# Patient Record
Sex: MALE | Race: ASIAN | NOT HISPANIC OR LATINO
[De-identification: names, ages, dates, MRNs, and addresses within clinical notes are randomized per-mention and may not be internally consistent; named-entity substitution may affect disease eponyms.]

---

## 2023-12-14 PROBLEM — Z00.00 ENCOUNTER FOR PREVENTIVE HEALTH EXAMINATION: Status: ACTIVE | Noted: 2023-12-14

## 2023-12-15 ENCOUNTER — APPOINTMENT (OUTPATIENT)
Dept: THORACIC SURGERY | Facility: CLINIC | Age: 73
End: 2023-12-15
Payer: MEDICARE

## 2023-12-15 ENCOUNTER — OUTPATIENT (OUTPATIENT)
Dept: OUTPATIENT SERVICES | Facility: HOSPITAL | Age: 73
LOS: 1 days | End: 2023-12-15
Payer: COMMERCIAL

## 2023-12-15 VITALS
OXYGEN SATURATION: 96 % | WEIGHT: 150 LBS | BODY MASS INDEX: 22.73 KG/M2 | TEMPERATURE: 98.1 F | HEART RATE: 113 BPM | HEIGHT: 68 IN | RESPIRATION RATE: 15 BRPM | SYSTOLIC BLOOD PRESSURE: 124 MMHG | DIASTOLIC BLOOD PRESSURE: 61 MMHG

## 2023-12-15 DIAGNOSIS — Z86.39 PERSONAL HISTORY OF OTHER ENDOCRINE, NUTRITIONAL AND METABOLIC DISEASE: ICD-10-CM

## 2023-12-15 DIAGNOSIS — R63.4 ABNORMAL WEIGHT LOSS: ICD-10-CM

## 2023-12-15 DIAGNOSIS — R18.8 OTHER ASCITES: ICD-10-CM

## 2023-12-15 DIAGNOSIS — Z01.818 ENCOUNTER FOR OTHER PREPROCEDURAL EXAMINATION: ICD-10-CM

## 2023-12-15 DIAGNOSIS — R06.00 DYSPNEA, UNSPECIFIED: ICD-10-CM

## 2023-12-15 DIAGNOSIS — Z86.79 PERSONAL HISTORY OF OTHER DISEASES OF THE CIRCULATORY SYSTEM: ICD-10-CM

## 2023-12-15 LAB
ALBUMIN SERPL ELPH-MCNC: 3.5 G/DL — SIGNIFICANT CHANGE UP (ref 3.3–5)
ALBUMIN SERPL ELPH-MCNC: 3.5 G/DL — SIGNIFICANT CHANGE UP (ref 3.3–5)
ALP SERPL-CCNC: 88 U/L — SIGNIFICANT CHANGE UP (ref 40–120)
ALP SERPL-CCNC: 88 U/L — SIGNIFICANT CHANGE UP (ref 40–120)
ALT FLD-CCNC: 15 U/L — SIGNIFICANT CHANGE UP (ref 10–45)
ALT FLD-CCNC: 15 U/L — SIGNIFICANT CHANGE UP (ref 10–45)
ANION GAP SERPL CALC-SCNC: 11 MMOL/L — SIGNIFICANT CHANGE UP (ref 5–17)
ANION GAP SERPL CALC-SCNC: 11 MMOL/L — SIGNIFICANT CHANGE UP (ref 5–17)
APPEARANCE UR: CLEAR — SIGNIFICANT CHANGE UP
APPEARANCE UR: CLEAR — SIGNIFICANT CHANGE UP
APTT BLD: 36.8 SEC — HIGH (ref 24.5–35.6)
APTT BLD: 36.8 SEC — HIGH (ref 24.5–35.6)
AST SERPL-CCNC: 14 U/L — SIGNIFICANT CHANGE UP (ref 10–40)
AST SERPL-CCNC: 14 U/L — SIGNIFICANT CHANGE UP (ref 10–40)
BACTERIA # UR AUTO: ABNORMAL /HPF
BACTERIA # UR AUTO: ABNORMAL /HPF
BASOPHILS # BLD AUTO: 0.04 K/UL — SIGNIFICANT CHANGE UP (ref 0–0.2)
BASOPHILS # BLD AUTO: 0.04 K/UL — SIGNIFICANT CHANGE UP (ref 0–0.2)
BASOPHILS NFR BLD AUTO: 0.4 % — SIGNIFICANT CHANGE UP (ref 0–2)
BASOPHILS NFR BLD AUTO: 0.4 % — SIGNIFICANT CHANGE UP (ref 0–2)
BILIRUB SERPL-MCNC: 0.2 MG/DL — SIGNIFICANT CHANGE UP (ref 0.2–1.2)
BILIRUB SERPL-MCNC: 0.2 MG/DL — SIGNIFICANT CHANGE UP (ref 0.2–1.2)
BILIRUB UR-MCNC: NEGATIVE — SIGNIFICANT CHANGE UP
BILIRUB UR-MCNC: NEGATIVE — SIGNIFICANT CHANGE UP
BLD GP AB SCN SERPL QL: NEGATIVE — SIGNIFICANT CHANGE UP
BLD GP AB SCN SERPL QL: NEGATIVE — SIGNIFICANT CHANGE UP
BUN SERPL-MCNC: 48 MG/DL — HIGH (ref 7–23)
BUN SERPL-MCNC: 48 MG/DL — HIGH (ref 7–23)
CALCIUM SERPL-MCNC: 12.2 MG/DL — HIGH (ref 8.4–10.5)
CALCIUM SERPL-MCNC: 12.2 MG/DL — HIGH (ref 8.4–10.5)
CAST: 3 /LPF — SIGNIFICANT CHANGE UP (ref 0–4)
CAST: 3 /LPF — SIGNIFICANT CHANGE UP (ref 0–4)
CHLORIDE SERPL-SCNC: 101 MMOL/L — SIGNIFICANT CHANGE UP (ref 96–108)
CHLORIDE SERPL-SCNC: 101 MMOL/L — SIGNIFICANT CHANGE UP (ref 96–108)
CO2 SERPL-SCNC: 25 MMOL/L — SIGNIFICANT CHANGE UP (ref 22–31)
CO2 SERPL-SCNC: 25 MMOL/L — SIGNIFICANT CHANGE UP (ref 22–31)
COD CRY URNS QL: PRESENT
COD CRY URNS QL: PRESENT
COLOR SPEC: YELLOW — SIGNIFICANT CHANGE UP
COLOR SPEC: YELLOW — SIGNIFICANT CHANGE UP
CREAT SERPL-MCNC: 1.91 MG/DL — HIGH (ref 0.5–1.3)
CREAT SERPL-MCNC: 1.91 MG/DL — HIGH (ref 0.5–1.3)
DIFF PNL FLD: NEGATIVE — SIGNIFICANT CHANGE UP
DIFF PNL FLD: NEGATIVE — SIGNIFICANT CHANGE UP
EGFR: 37 ML/MIN/1.73M2 — LOW
EGFR: 37 ML/MIN/1.73M2 — LOW
EOSINOPHIL # BLD AUTO: 1.24 K/UL — HIGH (ref 0–0.5)
EOSINOPHIL # BLD AUTO: 1.24 K/UL — HIGH (ref 0–0.5)
EOSINOPHIL NFR BLD AUTO: 13.7 % — HIGH (ref 0–6)
EOSINOPHIL NFR BLD AUTO: 13.7 % — HIGH (ref 0–6)
GLUCOSE SERPL-MCNC: 134 MG/DL — HIGH (ref 70–99)
GLUCOSE SERPL-MCNC: 134 MG/DL — HIGH (ref 70–99)
GLUCOSE UR QL: NEGATIVE MG/DL — SIGNIFICANT CHANGE UP
GLUCOSE UR QL: NEGATIVE MG/DL — SIGNIFICANT CHANGE UP
HCT VFR BLD CALC: 29.7 % — LOW (ref 39–50)
HCT VFR BLD CALC: 29.7 % — LOW (ref 39–50)
HGB BLD-MCNC: 9.7 G/DL — LOW (ref 13–17)
HGB BLD-MCNC: 9.7 G/DL — LOW (ref 13–17)
IMM GRANULOCYTES NFR BLD AUTO: 0.6 % — SIGNIFICANT CHANGE UP (ref 0–0.9)
IMM GRANULOCYTES NFR BLD AUTO: 0.6 % — SIGNIFICANT CHANGE UP (ref 0–0.9)
INR BLD: 1.29 — HIGH (ref 0.85–1.18)
INR BLD: 1.29 — HIGH (ref 0.85–1.18)
KETONES UR-MCNC: NEGATIVE MG/DL — SIGNIFICANT CHANGE UP
KETONES UR-MCNC: NEGATIVE MG/DL — SIGNIFICANT CHANGE UP
LEUKOCYTE ESTERASE UR-ACNC: NEGATIVE — SIGNIFICANT CHANGE UP
LEUKOCYTE ESTERASE UR-ACNC: NEGATIVE — SIGNIFICANT CHANGE UP
LYMPHOCYTES # BLD AUTO: 1.37 K/UL — SIGNIFICANT CHANGE UP (ref 1–3.3)
LYMPHOCYTES # BLD AUTO: 1.37 K/UL — SIGNIFICANT CHANGE UP (ref 1–3.3)
LYMPHOCYTES # BLD AUTO: 15.1 % — SIGNIFICANT CHANGE UP (ref 13–44)
LYMPHOCYTES # BLD AUTO: 15.1 % — SIGNIFICANT CHANGE UP (ref 13–44)
MCHC RBC-ENTMCNC: 29.2 PG — SIGNIFICANT CHANGE UP (ref 27–34)
MCHC RBC-ENTMCNC: 29.2 PG — SIGNIFICANT CHANGE UP (ref 27–34)
MCHC RBC-ENTMCNC: 32.7 GM/DL — SIGNIFICANT CHANGE UP (ref 32–36)
MCHC RBC-ENTMCNC: 32.7 GM/DL — SIGNIFICANT CHANGE UP (ref 32–36)
MCV RBC AUTO: 89.5 FL — SIGNIFICANT CHANGE UP (ref 80–100)
MCV RBC AUTO: 89.5 FL — SIGNIFICANT CHANGE UP (ref 80–100)
MONOCYTES # BLD AUTO: 0.79 K/UL — SIGNIFICANT CHANGE UP (ref 0–0.9)
MONOCYTES # BLD AUTO: 0.79 K/UL — SIGNIFICANT CHANGE UP (ref 0–0.9)
MONOCYTES NFR BLD AUTO: 8.7 % — SIGNIFICANT CHANGE UP (ref 2–14)
MONOCYTES NFR BLD AUTO: 8.7 % — SIGNIFICANT CHANGE UP (ref 2–14)
NEUTROPHILS # BLD AUTO: 5.57 K/UL — SIGNIFICANT CHANGE UP (ref 1.8–7.4)
NEUTROPHILS # BLD AUTO: 5.57 K/UL — SIGNIFICANT CHANGE UP (ref 1.8–7.4)
NEUTROPHILS NFR BLD AUTO: 61.5 % — SIGNIFICANT CHANGE UP (ref 43–77)
NEUTROPHILS NFR BLD AUTO: 61.5 % — SIGNIFICANT CHANGE UP (ref 43–77)
NITRITE UR-MCNC: NEGATIVE — SIGNIFICANT CHANGE UP
NITRITE UR-MCNC: NEGATIVE — SIGNIFICANT CHANGE UP
NRBC # BLD: 0 /100 WBCS — SIGNIFICANT CHANGE UP (ref 0–0)
NRBC # BLD: 0 /100 WBCS — SIGNIFICANT CHANGE UP (ref 0–0)
PH UR: 6 — SIGNIFICANT CHANGE UP (ref 5–8)
PH UR: 6 — SIGNIFICANT CHANGE UP (ref 5–8)
PLATELET # BLD AUTO: 512 K/UL — HIGH (ref 150–400)
PLATELET # BLD AUTO: 512 K/UL — HIGH (ref 150–400)
POTASSIUM SERPL-MCNC: 5.4 MMOL/L — HIGH (ref 3.5–5.3)
POTASSIUM SERPL-MCNC: 5.4 MMOL/L — HIGH (ref 3.5–5.3)
POTASSIUM SERPL-SCNC: 5.4 MMOL/L — HIGH (ref 3.5–5.3)
POTASSIUM SERPL-SCNC: 5.4 MMOL/L — HIGH (ref 3.5–5.3)
PROT SERPL-MCNC: 7.8 G/DL — SIGNIFICANT CHANGE UP (ref 6–8.3)
PROT SERPL-MCNC: 7.8 G/DL — SIGNIFICANT CHANGE UP (ref 6–8.3)
PROT UR-MCNC: 30 MG/DL
PROT UR-MCNC: 30 MG/DL
PROTHROM AB SERPL-ACNC: 14.6 SEC — HIGH (ref 9.5–13)
PROTHROM AB SERPL-ACNC: 14.6 SEC — HIGH (ref 9.5–13)
RBC # BLD: 3.32 M/UL — LOW (ref 4.2–5.8)
RBC # BLD: 3.32 M/UL — LOW (ref 4.2–5.8)
RBC # FLD: 14.4 % — SIGNIFICANT CHANGE UP (ref 10.3–14.5)
RBC # FLD: 14.4 % — SIGNIFICANT CHANGE UP (ref 10.3–14.5)
RBC CASTS # UR COMP ASSIST: 1 /HPF — SIGNIFICANT CHANGE UP (ref 0–4)
RBC CASTS # UR COMP ASSIST: 1 /HPF — SIGNIFICANT CHANGE UP (ref 0–4)
RH IG SCN BLD-IMP: POSITIVE — SIGNIFICANT CHANGE UP
RH IG SCN BLD-IMP: POSITIVE — SIGNIFICANT CHANGE UP
SODIUM SERPL-SCNC: 137 MMOL/L — SIGNIFICANT CHANGE UP (ref 135–145)
SODIUM SERPL-SCNC: 137 MMOL/L — SIGNIFICANT CHANGE UP (ref 135–145)
SP GR SPEC: 1.02 — SIGNIFICANT CHANGE UP (ref 1–1.03)
SP GR SPEC: 1.02 — SIGNIFICANT CHANGE UP (ref 1–1.03)
SQUAMOUS # UR AUTO: 0 /HPF — SIGNIFICANT CHANGE UP (ref 0–5)
SQUAMOUS # UR AUTO: 0 /HPF — SIGNIFICANT CHANGE UP (ref 0–5)
UROBILINOGEN FLD QL: 0.2 MG/DL — SIGNIFICANT CHANGE UP (ref 0.2–1)
UROBILINOGEN FLD QL: 0.2 MG/DL — SIGNIFICANT CHANGE UP (ref 0.2–1)
WBC # BLD: 9.06 K/UL — SIGNIFICANT CHANGE UP (ref 3.8–10.5)
WBC # BLD: 9.06 K/UL — SIGNIFICANT CHANGE UP (ref 3.8–10.5)
WBC # FLD AUTO: 9.06 K/UL — SIGNIFICANT CHANGE UP (ref 3.8–10.5)
WBC # FLD AUTO: 9.06 K/UL — SIGNIFICANT CHANGE UP (ref 3.8–10.5)
WBC UR QL: 1 /HPF — SIGNIFICANT CHANGE UP (ref 0–5)
WBC UR QL: 1 /HPF — SIGNIFICANT CHANGE UP (ref 0–5)

## 2023-12-15 PROCEDURE — 36415 COLL VENOUS BLD VENIPUNCTURE: CPT

## 2023-12-15 PROCEDURE — 99205 OFFICE O/P NEW HI 60 MIN: CPT

## 2023-12-19 PROBLEM — Z86.39 HISTORY OF HYPERLIPIDEMIA: Status: RESOLVED | Noted: 2023-12-19 | Resolved: 2023-12-19

## 2023-12-19 PROBLEM — R63.4 WEIGHT LOSS, UNINTENTIONAL: Status: ACTIVE | Noted: 2023-12-19

## 2023-12-19 PROBLEM — Z86.79 HISTORY OF HYPERTENSION: Status: RESOLVED | Noted: 2023-12-19 | Resolved: 2023-12-19

## 2023-12-19 PROBLEM — R18.8 ABDOMINAL ASCITES: Status: ACTIVE | Noted: 2023-12-19

## 2023-12-19 PROBLEM — R06.00 DYSPNEA, UNSPECIFIED TYPE: Status: ACTIVE | Noted: 2023-12-19

## 2023-12-19 RX ORDER — ALBUTEROL SULFATE 90 UG/1
108 (90 BASE) INHALANT RESPIRATORY (INHALATION)
Qty: 8 | Refills: 0 | Status: ACTIVE | COMMUNITY
Start: 2023-09-14

## 2023-12-19 RX ORDER — ALLOPURINOL 100 MG/1
100 TABLET ORAL
Qty: 30 | Refills: 0 | Status: ACTIVE | COMMUNITY
Start: 2023-06-24

## 2023-12-19 RX ORDER — ICOSAPENT ETHYL 1000 MG/1
1 CAPSULE ORAL
Qty: 360 | Refills: 0 | Status: ACTIVE | COMMUNITY
Start: 2023-09-16

## 2023-12-19 RX ORDER — FAMOTIDINE 40 MG/1
40 TABLET, FILM COATED ORAL
Qty: 60 | Refills: 0 | Status: ACTIVE | COMMUNITY
Start: 2023-12-08

## 2023-12-19 NOTE — PHYSICAL EXAM
[Ambulatory and capable of all self care but unable to carry out any work activities] : Status 2- Ambulatory and capable of all self care but unable to carry out any work activities. Up and about more than 50% of waking hours [General Appearance - Alert] : alert [Outer Ear] : the ears and nose were normal in appearance [Oropharynx] : the oropharynx was normal [Neck Appearance] : the appearance of the neck was normal [Neck Cervical Mass (___cm)] : no neck mass was observed [Jugular Venous Distention Increased] : there was no jugular-venous distention [Thyroid Diffuse Enlargement] : the thyroid was not enlarged [Thyroid Nodule] : there were no palpable thyroid nodules [Decreased Breath Sounds] : decreased breath sounds [Heart Rate And Rhythm] : heart rate was normal and rhythm regular [Heart Sounds] : normal S1 and S2 [Heart Sounds Gallop] : no gallops [Murmurs] : no murmurs [Heart Sounds Pericardial Friction Rub] : no pericardial rub [Bowel Sounds] : normal bowel sounds [Abdomen Soft] : soft [Abdomen Tenderness] : non-tender [] : no hepato-splenomegaly [Abdomen Mass (___ Cm)] : no abdominal mass palpated [Abnormal Walk] : normal gait [Musculoskeletal - Swelling] : no joint swelling seen [Nail Clubbing] : no clubbing  or cyanosis of the fingernails [Motor Tone] : muscle strength and tone were normal [Deep Tendon Reflexes (DTR)] : deep tendon reflexes were 2+ and symmetric [Sensation] : the sensory exam was normal to light touch and pinprick [No Focal Deficits] : no focal deficits [Oriented To Time, Place, And Person] : oriented to person, place, and time [Impaired Insight] : insight and judgment were intact [Affect] : the affect was normal [FreeTextEntry1] : generalized red small bumps

## 2023-12-19 NOTE — ASSESSMENT
[FreeTextEntry1] : 73 year old M, Cantonese speaking, never a smoker, with PMHx of HTN, HLD, who is referred by Dr. Edward Jensen for an initial evaluation of pleural effusion.   Patient reports right lower quadrant pain since Aug 2023, along with poor appetite, unintentionally weight loss around 20lbs, fatigue, progressively worse hoarseness and dyspnea. He had Shingles right arm in Oct 2023, followed by generalized hives.  Recent EGD and colonoscopy unremarkable. Denies chronic cough, nausea, vomiting. No personal history of TB infection.    I have independently reviewed the medical records and imaging at the time of this office consultation, and discussed the following interpretations with the patient: - PET/CT on 12/09/2023 revealed marked abnormal increased glucose metabolic activity in diffuse extensive pleural thickening in the right lung and a large right pleural effusion.  The findings may be related to pleural-based metastatic disease or mesothelioma.  I suggest pleural biopsy for a definite diagnosis. Meanwhile, will drain the right pleural effusion to improve his dyspnea.  - The risks, benefits and alternatives of proceeding with Right Video-Assisted Thoracoscopic Surgery, Robotic Assisted, Right pleural biopsy, Pleural effusion drainage, possible pleurodesis and PleurX placement were discussed with the patient. Specifically, the risk of bleeding, need for a blood transfusion, conversion to an open procedure, prolonged air leak, dysrhythmia, myocardial infarction, deep venous thrombosis, pulmonary embolus, postoperative pain syndrome, infection, as well as mortality was discussed with the patient and his family, who understood and agreed to the above. - Patient was advised to increased oral intake of protein.  Contact us should any concerning symptoms arise. Patient verbally understood and agreed with the plan.  Plan: - Right Video-Assisted Thoracoscopic Surgery, Robotic Assisted, Right pleural biopsy, Pleural effusion drainage, possible pleurodesis and PleurX placement on 01/05/2023 - PST in office today - Medical and Cardiac clearance prior to the surgery   I, Dr. John Kaiser MD, personally performed the evaluation and management (E/M) services for this new patient. That E/M includes conducting the clinically appropriate initial history &/or exam, assessing all conditions, and establishing the plan of care. Today, my NP, Clau Meyers, was here to observe &/or participate in the visit & follow plan of care established by me.

## 2023-12-19 NOTE — HISTORY OF PRESENT ILLNESS
[FreeTextEntry1] : GUILLAUME MANNING is a 73 year old M, Cantonese speaking, never a smoker, with PMHx of HTN, HLD, who is referred by Dr. Edward Jensen for an initial evaluation of pleural effusion.   Patient reports right lower quadrant pain since Aug 2023, along with poor appetite, unintentionally weight loss around 20lbs, fatigue, progressively worse hoarseness and dyspnea. He had Shingles right arm in Oct 2023, followed by generalized hives.  Recent EGD and colonoscopy unremarkable. Denies chronic cough, nausea, vomiting. No personal history of TB infection.    PET/CT on 12/09/2023 - there is noted to be marked abnormal increased glucose metabolic activity in diffuse extensive pleural thickening in the right lung field and findings may be related to pleural-based metastatic disease or mesothelioma with this pattern of activity seen.  - a large R pleural effusion is seen.  - No evidence of metastatic mediastinal lymphadenopathy or distant metastatic disease is suspected  MRI abdomen on 10/03/2023 - several small scattered cysts are noted throughout the hepatic parenchyma - additionally noted are several small cysts throughout both kidneys with the largest on left measuring approximately 2.0cm - there is a moderate amount of diffuse ascites throughout the peritoneal cavity. Small pleural effusions are evident within the posterior sulci, seen at the lung base.   MRI pelvis on 10/03/2023 - there is a solitary 6.2x 2.6 mm broad-based, low T2 intensity mucosal bulge at the mid posterior aspect of the urinary bladder. This may represent a region of trabeculation. However, an early mucosal lesion cannot be excluded.  - there is a small amount of ascites throughout the inferior peritoneal recesses.  - there are at least 3 discrete solid, subcentimeter, low T2 signal intensity, non-encapsulated and circumscribed nodules in the transition zone localized to the left antetior apex, left posterolateral mid-prostate, at the pseudocapsule level and right anteromedial transition zone at the prostatic base.  These are most likely to be PI-RADS2 lesions.

## 2023-12-19 NOTE — CONSULT LETTER
[Dear  ___] : Dear  [unfilled], [Consult Letter:] : I had the pleasure of evaluating your patient, [unfilled]. [Please see my note below.] : Please see my note below. [Consult Closing:] : Thank you very much for allowing me to participate in the care of this patient.  If you have any questions, please do not hesitate to contact me. [Sincerely,] : Sincerely, [FreeTextEntry2] : DR. Edward Jensen [FreeTextEntry3] : John Kaiser MD Professor, Cardiovascular & Thoracic Surgery [facility] [employer] Director of the [facility]  Director of Thoracic Surgery, [address] [place] [address] Phone: [phone] Fax: [phone]

## 2024-01-01 ENCOUNTER — NON-APPOINTMENT (OUTPATIENT)
Age: 74
End: 2024-01-01

## 2024-01-02 RX ORDER — PANCRELIPASE 36000; 180000; 114000 [USP'U]/1; [USP'U]/1; [USP'U]/1
36000-114000 CAPSULE, DELAYED RELEASE PELLETS ORAL
Qty: 120 | Refills: 0 | Status: DISCONTINUED | COMMUNITY
Start: 2023-09-14 | End: 2024-01-02

## 2024-01-02 RX ORDER — DAPAGLIFLOZIN 10 MG/1
10 TABLET, FILM COATED ORAL
Qty: 30 | Refills: 0 | Status: DISCONTINUED | COMMUNITY
Start: 2023-08-18 | End: 2024-01-02

## 2024-01-02 RX ORDER — AMLODIPINE AND OLMESARTAN MEDOXOMIL 5; 40 MG/1; MG/1
5-40 TABLET ORAL
Qty: 30 | Refills: 0 | Status: DISCONTINUED | COMMUNITY
Start: 2023-09-14 | End: 2024-01-02

## 2024-01-02 RX ORDER — LOSARTAN POTASSIUM 100 MG/1
100 TABLET, FILM COATED ORAL
Qty: 30 | Refills: 0 | Status: DISCONTINUED | COMMUNITY
Start: 2023-06-10 | End: 2024-01-02

## 2024-01-02 RX ORDER — MULTI VITAMIN/MINERAL SUPPLEMENT WITH ASCORBIC ACID, NIACIN, PYRIDOXINE, PANTOTHENIC ACID, FOLIC ACID, RIBOFLAVIN, THIAMIN, BIOTIN, COBALAMIN AND ZINC. 60; 20; 12.5; 10; 10; 1.7; 1.5; 1; .3; .006 MG/1; MG/1; MG/1; MG/1; MG/1; MG/1; MG/1; MG/1; MG/1; MG/1
TABLET, COATED ORAL
Refills: 0 | Status: ACTIVE | COMMUNITY

## 2024-01-02 NOTE — H&P ADULT - REASON FOR ADMISSION
elective surgery :  Right Video-Assisted Thoracoscopic Surgery, Robotic Assisted, Right pleural biopsy, Pleural effusion drainage, possible pleurodesis and PleurX placement

## 2024-01-02 NOTE — H&P ADULT - NSHPPHYSICALEXAM_GEN_ALL_CORE
weight = 150 lbs    ECOG Performance Status: Status 2- Ambulatory and capable of all self care but unable to carry out any work activities. Up and about more than 50% of waking hours.     Constitutional: alert . ill looking.  ENT: the ears and nose were normal in appearance . the oropharynx was normal.  Neck: the appearance of the neck was normal, no neck mass was observed, the thyroid was not enlarged and there were no palpable thyroid nodules . there was no jugular-venous distention.  decreased breath sounds heard on auscultation (on right).  Heart: heart rate was normal and rhythm regular, normal S1 and S2, no gallops, no murmurs and no pericardial rub.  Abdomen: normal bowel sounds, soft, non-tender, no hepato-splenomegaly and no abdominal mass palpated.  Musculoskeletal: normal gait, no clubbing or cyanosis of the fingernails, no joint swelling seen and muscle strength and tone were normal.  Skin: generalized red small bumps.  Neurological: deep tendon reflexes were 2+ and symmetric, the sensory exam was normal to light touch and pinprick and no focal deficits.  Psychiatric: oriented to person, place, and time, insight and judgment were intact and the affect was normal.

## 2024-01-02 NOTE — H&P ADULT - HISTORY OF PRESENT ILLNESS
GUILLAUME MANNING is a 73 year old M, Cantonese speaking, never a smoker, with PMHx of HTN, HLD, who is referred by Dr. Edward Jensen for an initial evaluation of pleural effusion.  ?  Patient reports right lower quadrant pain since Aug 2023, along with poor appetite, unintentionally weight loss around 20lbs, fatigue, progressively worse hoarseness and dyspnea. He had Shingles right arm in Oct 2023, followed by generalized hives. Recent EGD and colonoscopy unremarkable. Denies chronic cough, nausea, vomiting. No personal history of TB infection. GUILLAUME MANNING is a 73 year old M, Cantonese speaking, never a smoker, with PMHx of HTN, HLD, who is referred by Dr. Edward Jensen for an initial evaluation of pleural effusion.  ?  Patient reports right lower quadrant pain since Aug 2023, along with poor appetite, unintentionally weight loss around 20lbs, fatigue, progressively worse hoarseness and dyspnea. He had Shingles right arm in Oct 2023, followed by generalized hives. Recent EGD and colonoscopy unremarkable. Denies chronic cough, nausea, vomiting. No personal history of TB infection.    Patient seen in same day holding area; Reports no changes to PMHx or medications since last seen by our team. Denies acute or current SOB, chest pain, palpitation, N/V/D, fever/chills, recent illness, or any other concerning symptoms.

## 2024-01-02 NOTE — H&P ADULT - NSHPREVIEWOFSYSTEMS_GEN_ALL_CORE
Constitutional: as noted in HPI.    ENT: as noted in HPI.    Respiratory: as noted in HPI.    Gastrointestinal: as noted in HPI.    Integumentary: as noted in HPI.    Eyes, Cardiovascular, Genitourinary, Musculoskeletal, Neurological and Psychiatric are otherwise negative.  ?

## 2024-01-02 NOTE — H&P ADULT - NSICDXPASTMEDICALHX_GEN_ALL_CORE_FT
PAST MEDICAL HISTORY:  Gout     HTN (hypertension)     Hyperlipidemia     Proteinuria     Renal stones     Stage 3 chronic kidney disease

## 2024-01-02 NOTE — H&P ADULT - ASSESSMENT
73 year old M, Cantonese speaking, never a smoker, with PMHx of HTN, HLD, who is referred by Dr. Edward Jensen for an initial evaluation of pleural effusion.  ?  Patient reports right lower quadrant pain since Aug 2023, along with poor appetite, unintentionally weight loss around 20lbs, fatigue, progressively worse hoarseness and dyspnea. He had Shingles right arm in Oct 2023, followed by generalized hives. Recent EGD and colonoscopy unremarkable. Denies chronic cough, nausea, vomiting. No personal history of TB infection.  ?  I have independently reviewed the medical records and imaging at the time of this office consultation, and discussed the following interpretations with the patient:  - PET/CT on 12/09/2023 revealed marked abnormal increased glucose metabolic activity in diffuse extensive pleural thickening in the right lung and a large right pleural effusion. The findings may be related to pleural-based metastatic disease or mesothelioma. I suggest pleural biopsy for a definite diagnosis. Meanwhile, will drain the right pleural effusion to improve his dyspnea.  - The risks, benefits and alternatives of proceeding with Right Video-Assisted Thoracoscopic Surgery, Robotic Assisted, Right pleural biopsy, Pleural effusion drainage, possible pleurodesis and PleurX placement were discussed with the patient. Specifically, the risk of bleeding, need for a blood transfusion, conversion to an open procedure, prolonged air leak, dysrhythmia, myocardial infarction, deep venous thrombosis, pulmonary embolus, postoperative pain syndrome, infection, as well as mortality was discussed with the patient and his family, who understood and agreed to the above.    He is cleared by cardiology, nephrology and PCP for the above procedure.       ? 73 year old M, Cantonese speaking, never a smoker, with PMHx of HTN, HLD, who is referred by Dr. Edward Jensen for an initial evaluation of pleural effusion.  ?  Patient reports right lower quadrant pain since Aug 2023, along with poor appetite, unintentionally weight loss around 20lbs, fatigue, progressively worse hoarseness and dyspnea. He had Shingles right arm in Oct 2023, followed by generalized hives. Recent EGD and colonoscopy unremarkable. Denies chronic cough, nausea, vomiting. No personal history of TB infection.  ?  I have independently reviewed the medical records and imaging at the time of this office consultation, and discussed the following interpretations with the patient:  - PET/CT on 12/09/2023 revealed marked abnormal increased glucose metabolic activity in diffuse extensive pleural thickening in the right lung and a large right pleural effusion. The findings may be related to pleural-based metastatic disease or mesothelioma. I suggest pleural biopsy for a definite diagnosis. Meanwhile, will drain the right pleural effusion to improve his dyspnea.  - The risks, benefits and alternatives of proceeding with Right Video-Assisted Thoracoscopic Surgery, Robotic Assisted, Right pleural biopsy, Pleural effusion drainage, possible pleurodesis and PleurX placement were discussed with the patient. Specifically, the risk of bleeding, need for a blood transfusion, conversion to an open procedure, prolonged air leak, dysrhythmia, myocardial infarction, deep venous thrombosis, pulmonary embolus, postoperative pain syndrome, infection, as well as mortality was discussed with the patient and his family, who understood and agreed to the above.    He is cleared by cardiology, nephrology and PCP for the above procedure.     Admit under Dr. Kaiser  via same day surgery. Consent signed, placed on chart.  Risks/benefits reviewed, patient understands and agrees. T&S ordered and blood products placed on hold for OR.  To 9L  post-op.     ?

## 2024-01-02 NOTE — H&P ADULT - NSHPOUTPATIENTPROVIDERS_GEN_ALL_CORE
PCP: Dr. Geovany Fernandes office NP Raul Gonzalez  Phone: [phone]  ?  Cardiology: [facility]  Phone: [phone]    Renal: [facility]: Marilee Rodriguez [phone] PCP: Dr. Geovany Fernandes office NP Raul Gonzalez  Phone: [phone]  ?  Cardiology: Counts include 234 beds at the [facility]  Phone: [phone]    Renal: Counts include 234 beds at the [facility]: Marilee Rodriguez [phone]

## 2024-01-02 NOTE — H&P ADULT - NSHPLABSRESULTS_GEN_ALL_CORE
PET/CT on 12/09/2023  - there is noted to be marked abnormal increased glucose metabolic activity in diffuse extensive pleural thickening in the right lung field and findings may be related to pleural-based metastatic disease or mesothelioma with this pattern of activity seen.  - a large R pleural effusion is seen.  - No evidence of metastatic mediastinal lymphadenopathy or distant metastatic disease is suspected  ?  MRI abdomen on 10/03/2023  - several small scattered cysts are noted throughout the hepatic parenchyma  - additionally noted are several small cysts throughout both kidneys with the largest on left measuring approximately 2.0cm  - there is a moderate amount of diffuse ascites throughout the peritoneal cavity. Small pleural effusions are evident within the posterior sulci, seen at the lung base.  ?  MRI pelvis on 10/03/2023  - there is a solitary 6.2x 2.6 mm broad-based, low T2 intensity mucosal bulge at the mid posterior aspect of the urinary bladder. This may represent a region of trabeculation. However, an early mucosal lesion cannot be excluded.  - there is a small amount of ascites throughout the inferior peritoneal recesses.  - there are at least 3 discrete solid, subcentimeter, low T2 signal intensity, non-encapsulated and circumscribed nodules in the transition zone localized to the left antetior apex, left posterolateral mid-prostate, at the pseudocapsule level and right anteromedial transition zone at the prostatic base. These are most likely to be PI-RADS2 lesions.

## 2024-01-04 ENCOUNTER — TRANSCRIPTION ENCOUNTER (OUTPATIENT)
Age: 74
End: 2024-01-04

## 2024-01-04 VITALS
HEART RATE: 78 BPM | DIASTOLIC BLOOD PRESSURE: 74 MMHG | RESPIRATION RATE: 16 BRPM | SYSTOLIC BLOOD PRESSURE: 125 MMHG | WEIGHT: 153 LBS | HEIGHT: 65 IN | TEMPERATURE: 97 F | OXYGEN SATURATION: 97 %

## 2024-01-04 NOTE — PATIENT PROFILE ADULT - FALL HARM RISK - HARM RISK INTERVENTIONS
Communicate Risk of Fall with Harm to all staff/Reinforce activity limits and safety measures with patient and family/Tailored Fall Risk Interventions/Visual Cue: Yellow wristband and red socks/Bed in lowest position, wheels locked, appropriate side rails in place/Call bell, personal items and telephone in reach/Instruct patient to call for assistance before getting out of bed or chair/Non-slip footwear when patient is out of bed/[place] to call system/Physically safe environment - no spills, clutter or unnecessary equipment/Purposeful Proactive Rounding/Room/bathroom lighting operational, light cord in reach Communicate Risk of Fall with Harm to all staff/Reinforce activity limits and safety measures with patient and family/Tailored Fall Risk Interventions/Visual Cue: Yellow wristband and red socks/Bed in lowest position, wheels locked, appropriate side rails in place/Call bell, personal items and telephone in reach/Instruct patient to call for assistance before getting out of bed or chair/Non-slip footwear when patient is out of bed/[place] to call system/Physically safe environment - no spills, clutter or unnecessary equipment/Purposeful Proactive Rounding/Room/bathroom lighting operational, light cord in reach

## 2024-01-04 NOTE — PRE-OP CHECKLIST - SELECT TESTS ORDERED
echo, PET CT/CBC/CMP/PT/PTT/INR/Urinalysis/EKG echo, PET CT/CBC/CMP/PT/PTT/INR/Type and Screen/Urinalysis/EKG

## 2024-01-04 NOTE — PRE-OP CHECKLIST - 1.
Call [facility] nurse for any questions or concerns Mon-Fri 8am-4pm:                                                [phone]                                For concerns after hours: [phone]    Isaak Ryan with lab              Visiting hours and policy

## 2024-01-05 ENCOUNTER — INPATIENT (INPATIENT)
Facility: HOSPITAL | Age: 74
LOS: 11 days | Discharge: HOME HEALTH | DRG: 163 | End: 2024-01-17
Attending: THORACIC SURGERY (CARDIOTHORACIC VASCULAR SURGERY) | Admitting: THORACIC SURGERY (CARDIOTHORACIC VASCULAR SURGERY)
Payer: MEDICARE

## 2024-01-05 ENCOUNTER — RESULT REVIEW (OUTPATIENT)
Age: 74
End: 2024-01-05

## 2024-01-05 ENCOUNTER — APPOINTMENT (OUTPATIENT)
Dept: THORACIC SURGERY | Facility: HOSPITAL | Age: 74
End: 2024-01-05

## 2024-01-05 LAB
ANION GAP SERPL CALC-SCNC: 10 MMOL/L — SIGNIFICANT CHANGE UP (ref 5–17)
ANION GAP SERPL CALC-SCNC: 10 MMOL/L — SIGNIFICANT CHANGE UP (ref 5–17)
ANION GAP SERPL CALC-SCNC: 18 MMOL/L — HIGH (ref 5–17)
ANION GAP SERPL CALC-SCNC: 18 MMOL/L — HIGH (ref 5–17)
BASOPHILS # BLD AUTO: 0.03 K/UL — SIGNIFICANT CHANGE UP (ref 0–0.2)
BASOPHILS # BLD AUTO: 0.03 K/UL — SIGNIFICANT CHANGE UP (ref 0–0.2)
BASOPHILS NFR BLD AUTO: 0.2 % — SIGNIFICANT CHANGE UP (ref 0–2)
BASOPHILS NFR BLD AUTO: 0.2 % — SIGNIFICANT CHANGE UP (ref 0–2)
BUN SERPL-MCNC: 43 MG/DL — HIGH (ref 7–23)
BUN SERPL-MCNC: 43 MG/DL — HIGH (ref 7–23)
BUN SERPL-MCNC: 47 MG/DL — HIGH (ref 7–23)
BUN SERPL-MCNC: 47 MG/DL — HIGH (ref 7–23)
CALCIUM SERPL-MCNC: 11.1 MG/DL — HIGH (ref 8.4–10.5)
CALCIUM SERPL-MCNC: 11.1 MG/DL — HIGH (ref 8.4–10.5)
CALCIUM SERPL-MCNC: 11.4 MG/DL — HIGH (ref 8.4–10.5)
CALCIUM SERPL-MCNC: 11.4 MG/DL — HIGH (ref 8.4–10.5)
CHLORIDE SERPL-SCNC: 100 MMOL/L — SIGNIFICANT CHANGE UP (ref 96–108)
CHLORIDE SERPL-SCNC: 100 MMOL/L — SIGNIFICANT CHANGE UP (ref 96–108)
CHLORIDE SERPL-SCNC: 103 MMOL/L — SIGNIFICANT CHANGE UP (ref 96–108)
CHLORIDE SERPL-SCNC: 103 MMOL/L — SIGNIFICANT CHANGE UP (ref 96–108)
CO2 SERPL-SCNC: 17 MMOL/L — LOW (ref 22–31)
CO2 SERPL-SCNC: 17 MMOL/L — LOW (ref 22–31)
CO2 SERPL-SCNC: 20 MMOL/L — LOW (ref 22–31)
CO2 SERPL-SCNC: 20 MMOL/L — LOW (ref 22–31)
CREAT SERPL-MCNC: 2.53 MG/DL — HIGH (ref 0.5–1.3)
CREAT SERPL-MCNC: 2.53 MG/DL — HIGH (ref 0.5–1.3)
CREAT SERPL-MCNC: 2.71 MG/DL — HIGH (ref 0.5–1.3)
CREAT SERPL-MCNC: 2.71 MG/DL — HIGH (ref 0.5–1.3)
EGFR: 24 ML/MIN/1.73M2 — LOW
EGFR: 24 ML/MIN/1.73M2 — LOW
EGFR: 26 ML/MIN/1.73M2 — LOW
EGFR: 26 ML/MIN/1.73M2 — LOW
EOSINOPHIL # BLD AUTO: 0.54 K/UL — HIGH (ref 0–0.5)
EOSINOPHIL # BLD AUTO: 0.54 K/UL — HIGH (ref 0–0.5)
EOSINOPHIL NFR BLD AUTO: 4 % — SIGNIFICANT CHANGE UP (ref 0–6)
EOSINOPHIL NFR BLD AUTO: 4 % — SIGNIFICANT CHANGE UP (ref 0–6)
GLUCOSE SERPL-MCNC: 113 MG/DL — HIGH (ref 70–99)
GLUCOSE SERPL-MCNC: 113 MG/DL — HIGH (ref 70–99)
GLUCOSE SERPL-MCNC: 134 MG/DL — HIGH (ref 70–99)
GLUCOSE SERPL-MCNC: 134 MG/DL — HIGH (ref 70–99)
GRAM STN FLD: SIGNIFICANT CHANGE UP
HCT VFR BLD CALC: 27.4 % — LOW (ref 39–50)
HCT VFR BLD CALC: 27.4 % — LOW (ref 39–50)
HGB BLD-MCNC: 9.1 G/DL — LOW (ref 13–17)
HGB BLD-MCNC: 9.1 G/DL — LOW (ref 13–17)
IMM GRANULOCYTES NFR BLD AUTO: 0.7 % — SIGNIFICANT CHANGE UP (ref 0–0.9)
IMM GRANULOCYTES NFR BLD AUTO: 0.7 % — SIGNIFICANT CHANGE UP (ref 0–0.9)
LYMPHOCYTES # BLD AUTO: 1.38 K/UL — SIGNIFICANT CHANGE UP (ref 1–3.3)
LYMPHOCYTES # BLD AUTO: 1.38 K/UL — SIGNIFICANT CHANGE UP (ref 1–3.3)
LYMPHOCYTES # BLD AUTO: 10.2 % — LOW (ref 13–44)
LYMPHOCYTES # BLD AUTO: 10.2 % — LOW (ref 13–44)
MCHC RBC-ENTMCNC: 29.2 PG — SIGNIFICANT CHANGE UP (ref 27–34)
MCHC RBC-ENTMCNC: 29.2 PG — SIGNIFICANT CHANGE UP (ref 27–34)
MCHC RBC-ENTMCNC: 33.2 GM/DL — SIGNIFICANT CHANGE UP (ref 32–36)
MCHC RBC-ENTMCNC: 33.2 GM/DL — SIGNIFICANT CHANGE UP (ref 32–36)
MCV RBC AUTO: 87.8 FL — SIGNIFICANT CHANGE UP (ref 80–100)
MCV RBC AUTO: 87.8 FL — SIGNIFICANT CHANGE UP (ref 80–100)
MONOCYTES # BLD AUTO: 0.26 K/UL — SIGNIFICANT CHANGE UP (ref 0–0.9)
MONOCYTES # BLD AUTO: 0.26 K/UL — SIGNIFICANT CHANGE UP (ref 0–0.9)
MONOCYTES NFR BLD AUTO: 1.9 % — LOW (ref 2–14)
MONOCYTES NFR BLD AUTO: 1.9 % — LOW (ref 2–14)
NEUTROPHILS # BLD AUTO: 11.19 K/UL — HIGH (ref 1.8–7.4)
NEUTROPHILS # BLD AUTO: 11.19 K/UL — HIGH (ref 1.8–7.4)
NEUTROPHILS NFR BLD AUTO: 83 % — HIGH (ref 43–77)
NEUTROPHILS NFR BLD AUTO: 83 % — HIGH (ref 43–77)
NRBC # BLD: 0 /100 WBCS — SIGNIFICANT CHANGE UP (ref 0–0)
NRBC # BLD: 0 /100 WBCS — SIGNIFICANT CHANGE UP (ref 0–0)
PLATELET # BLD AUTO: 285 K/UL — SIGNIFICANT CHANGE UP (ref 150–400)
PLATELET # BLD AUTO: 285 K/UL — SIGNIFICANT CHANGE UP (ref 150–400)
POTASSIUM SERPL-MCNC: 4.6 MMOL/L — SIGNIFICANT CHANGE UP (ref 3.5–5.3)
POTASSIUM SERPL-MCNC: 4.6 MMOL/L — SIGNIFICANT CHANGE UP (ref 3.5–5.3)
POTASSIUM SERPL-MCNC: 5.1 MMOL/L — SIGNIFICANT CHANGE UP (ref 3.5–5.3)
POTASSIUM SERPL-MCNC: 5.1 MMOL/L — SIGNIFICANT CHANGE UP (ref 3.5–5.3)
POTASSIUM SERPL-SCNC: 4.6 MMOL/L — SIGNIFICANT CHANGE UP (ref 3.5–5.3)
POTASSIUM SERPL-SCNC: 4.6 MMOL/L — SIGNIFICANT CHANGE UP (ref 3.5–5.3)
POTASSIUM SERPL-SCNC: 5.1 MMOL/L — SIGNIFICANT CHANGE UP (ref 3.5–5.3)
POTASSIUM SERPL-SCNC: 5.1 MMOL/L — SIGNIFICANT CHANGE UP (ref 3.5–5.3)
RBC # BLD: 3.12 M/UL — LOW (ref 4.2–5.8)
RBC # BLD: 3.12 M/UL — LOW (ref 4.2–5.8)
RBC # FLD: 16.5 % — HIGH (ref 10.3–14.5)
RBC # FLD: 16.5 % — HIGH (ref 10.3–14.5)
SODIUM SERPL-SCNC: 133 MMOL/L — LOW (ref 135–145)
SODIUM SERPL-SCNC: 133 MMOL/L — LOW (ref 135–145)
SODIUM SERPL-SCNC: 135 MMOL/L — SIGNIFICANT CHANGE UP (ref 135–145)
SODIUM SERPL-SCNC: 135 MMOL/L — SIGNIFICANT CHANGE UP (ref 135–145)
SPECIMEN SOURCE: SIGNIFICANT CHANGE UP
WBC # BLD: 13.5 K/UL — HIGH (ref 3.8–10.5)
WBC # BLD: 13.5 K/UL — HIGH (ref 3.8–10.5)
WBC # FLD AUTO: 13.5 K/UL — HIGH (ref 3.8–10.5)
WBC # FLD AUTO: 13.5 K/UL — HIGH (ref 3.8–10.5)

## 2024-01-05 PROCEDURE — 81001 URINALYSIS AUTO W/SCOPE: CPT

## 2024-01-05 PROCEDURE — 86850 RBC ANTIBODY SCREEN: CPT

## 2024-01-05 PROCEDURE — 88305 TISSUE EXAM BY PATHOLOGIST: CPT | Mod: 26

## 2024-01-05 PROCEDURE — 85610 PROTHROMBIN TIME: CPT

## 2024-01-05 PROCEDURE — 99222 1ST HOSP IP/OBS MODERATE 55: CPT

## 2024-01-05 PROCEDURE — 88112 CYTOPATH CELL ENHANCE TECH: CPT | Mod: 26

## 2024-01-05 PROCEDURE — 86900 BLOOD TYPING SEROLOGIC ABO: CPT

## 2024-01-05 PROCEDURE — 80053 COMPREHEN METABOLIC PANEL: CPT

## 2024-01-05 PROCEDURE — 71045 X-RAY EXAM CHEST 1 VIEW: CPT | Mod: 26

## 2024-01-05 PROCEDURE — 99223 1ST HOSP IP/OBS HIGH 75: CPT

## 2024-01-05 PROCEDURE — 85025 COMPLETE CBC W/AUTO DIFF WBC: CPT

## 2024-01-05 PROCEDURE — 88331 PATH CONSLTJ SURG 1 BLK 1SPC: CPT | Mod: 26

## 2024-01-05 PROCEDURE — 32652 THORACOSCOPY REM TOTL CORTEX: CPT

## 2024-01-05 PROCEDURE — 85730 THROMBOPLASTIN TIME PARTIAL: CPT

## 2024-01-05 PROCEDURE — 88305 TISSUE EXAM BY PATHOLOGIST: CPT | Mod: 26,XP

## 2024-01-05 PROCEDURE — 32652 THORACOSCOPY REM TOTL CORTEX: CPT | Mod: 80

## 2024-01-05 PROCEDURE — 86901 BLOOD TYPING SEROLOGIC RH(D): CPT

## 2024-01-05 PROCEDURE — S2900 ROBOTIC SURGICAL SYSTEM: CPT | Mod: NC

## 2024-01-05 DEVICE — CHEST DRAIN THORACIC PVC 28FR RIGHT ANGLE: Type: IMPLANTABLE DEVICE | Site: RIGHT | Status: FUNCTIONAL

## 2024-01-05 DEVICE — CHEST DRAIN THORACIC PVC 28FR STRAIGHT: Type: IMPLANTABLE DEVICE | Site: RIGHT | Status: FUNCTIONAL

## 2024-01-05 RX ORDER — HEPARIN SODIUM 5000 [USP'U]/ML
5000 INJECTION INTRAVENOUS; SUBCUTANEOUS EVERY 8 HOURS
Refills: 0 | Status: DISCONTINUED | OUTPATIENT
Start: 2024-01-05 | End: 2024-01-17

## 2024-01-05 RX ORDER — ALBUMIN HUMAN 25 %
250 VIAL (ML) INTRAVENOUS ONCE
Refills: 0 | Status: COMPLETED | OUTPATIENT
Start: 2024-01-05 | End: 2024-01-05

## 2024-01-05 RX ORDER — ATORVASTATIN CALCIUM 80 MG/1
1 TABLET, FILM COATED ORAL
Refills: 0 | DISCHARGE

## 2024-01-05 RX ORDER — PANTOPRAZOLE SODIUM 20 MG/1
40 TABLET, DELAYED RELEASE ORAL
Refills: 0 | Status: DISCONTINUED | OUTPATIENT
Start: 2024-01-05 | End: 2024-01-06

## 2024-01-05 RX ORDER — ACETAMINOPHEN 500 MG
975 TABLET ORAL ONCE
Refills: 0 | Status: COMPLETED | OUTPATIENT
Start: 2024-01-05 | End: 2024-01-05

## 2024-01-05 RX ORDER — OXYCODONE HYDROCHLORIDE 5 MG/1
5 TABLET ORAL EVERY 6 HOURS
Refills: 0 | Status: DISCONTINUED | OUTPATIENT
Start: 2024-01-05 | End: 2024-01-05

## 2024-01-05 RX ORDER — ATORVASTATIN CALCIUM 80 MG/1
10 TABLET, FILM COATED ORAL AT BEDTIME
Refills: 0 | Status: DISCONTINUED | OUTPATIENT
Start: 2024-01-05 | End: 2024-01-17

## 2024-01-05 RX ORDER — HEPARIN SODIUM 5000 [USP'U]/ML
5000 INJECTION INTRAVENOUS; SUBCUTANEOUS ONCE
Refills: 0 | Status: COMPLETED | OUTPATIENT
Start: 2024-01-05 | End: 2024-01-05

## 2024-01-05 RX ORDER — NITROGLYCERIN 6.5 MG
1 CAPSULE, EXTENDED RELEASE ORAL
Refills: 0 | DISCHARGE

## 2024-01-05 RX ORDER — FEBUXOSTAT 40 MG/1
1 TABLET ORAL
Refills: 0 | DISCHARGE

## 2024-01-05 RX ORDER — SODIUM BICARBONATE 1 MEQ/ML
1 SYRINGE (ML) INTRAVENOUS
Refills: 0 | DISCHARGE

## 2024-01-05 RX ORDER — SODIUM CHLORIDE 9 MG/ML
1000 INJECTION INTRAMUSCULAR; INTRAVENOUS; SUBCUTANEOUS
Refills: 0 | Status: DISCONTINUED | OUTPATIENT
Start: 2024-01-05 | End: 2024-01-07

## 2024-01-05 RX ORDER — ACETAMINOPHEN 500 MG
975 TABLET ORAL EVERY 6 HOURS
Refills: 0 | Status: DISCONTINUED | OUTPATIENT
Start: 2024-01-05 | End: 2024-01-17

## 2024-01-05 RX ORDER — SODIUM CHLORIDE 9 MG/ML
1000 INJECTION, SOLUTION INTRAVENOUS ONCE
Refills: 0 | Status: COMPLETED | OUTPATIENT
Start: 2024-01-05 | End: 2024-01-05

## 2024-01-05 RX ORDER — SENNA PLUS 8.6 MG/1
2 TABLET ORAL AT BEDTIME
Refills: 0 | Status: DISCONTINUED | OUTPATIENT
Start: 2024-01-05 | End: 2024-01-17

## 2024-01-05 RX ORDER — CEFAZOLIN SODIUM 1 G
2000 VIAL (EA) INJECTION EVERY 8 HOURS
Refills: 0 | Status: COMPLETED | OUTPATIENT
Start: 2024-01-05 | End: 2024-01-07

## 2024-01-05 RX ORDER — POLYETHYLENE GLYCOL 3350 17 G/17G
17 POWDER, FOR SOLUTION ORAL DAILY
Refills: 0 | Status: DISCONTINUED | OUTPATIENT
Start: 2024-01-05 | End: 2024-01-17

## 2024-01-05 RX ORDER — ASPIRIN/CALCIUM CARB/MAGNESIUM 324 MG
0 TABLET ORAL
Refills: 0 | DISCHARGE

## 2024-01-05 RX ORDER — SODIUM CHLORIDE 9 MG/ML
1000 INJECTION, SOLUTION INTRAVENOUS
Refills: 0 | Status: DISCONTINUED | OUTPATIENT
Start: 2024-01-05 | End: 2024-01-05

## 2024-01-05 RX ADMIN — OXYCODONE HYDROCHLORIDE 5 MILLIGRAM(S): 5 TABLET ORAL at 12:00

## 2024-01-05 RX ADMIN — SODIUM CHLORIDE 1000 MILLILITER(S): 9 INJECTION, SOLUTION INTRAVENOUS at 14:20

## 2024-01-05 RX ADMIN — SODIUM CHLORIDE 50 MILLILITER(S): 9 INJECTION, SOLUTION INTRAVENOUS at 12:00

## 2024-01-05 RX ADMIN — Medication 5 MILLIGRAM(S): at 21:29

## 2024-01-05 RX ADMIN — Medication 100 MILLIGRAM(S): at 16:37

## 2024-01-05 RX ADMIN — HALOPERIDOL DECANOATE 5 MILLIGRAM(S): 100 INJECTION INTRAMUSCULAR at 23:00

## 2024-01-05 RX ADMIN — OXYCODONE HYDROCHLORIDE 5 MILLIGRAM(S): 5 TABLET ORAL at 18:48

## 2024-01-05 RX ADMIN — HEPARIN SODIUM 5000 UNIT(S): 5000 INJECTION INTRAVENOUS; SUBCUTANEOUS at 21:29

## 2024-01-05 RX ADMIN — HEPARIN SODIUM 5000 UNIT(S): 5000 INJECTION INTRAVENOUS; SUBCUTANEOUS at 07:04

## 2024-01-05 RX ADMIN — OXYCODONE HYDROCHLORIDE 5 MILLIGRAM(S): 5 TABLET ORAL at 12:30

## 2024-01-05 RX ADMIN — SODIUM CHLORIDE 50 MILLILITER(S): 9 INJECTION INTRAMUSCULAR; INTRAVENOUS; SUBCUTANEOUS at 23:19

## 2024-01-05 RX ADMIN — Medication 975 MILLIGRAM(S): at 07:03

## 2024-01-05 RX ADMIN — Medication 500 MILLILITER(S): at 23:19

## 2024-01-05 RX ADMIN — Medication 100 MILLIGRAM(S): at 23:19

## 2024-01-05 NOTE — PROGRESS NOTE ADULT - SUBJECTIVE AND OBJECTIVE BOX
OPERATION & DATE: 1/5 R VATS, pleural biopsy, decortication    SUBJECTIVE ASSESSMENT: Resting comfortalby in bed, reports some mild pain from incision site.     VITAL SIGNS:  Vital Signs Last 24 Hrs  T(C): 35.7 (05 Jan 2024 11:14), Max: 36.2 (05 Jan 2024 08:41)  T(F): 96.2 (05 Jan 2024 11:14), Max: 96.2 (05 Jan 2024 11:14)  HR: 86 (05 Jan 2024 12:14) (78 - 86)  BP: 118/58 (05 Jan 2024 11:44) (106/55 - 125/74)  BP(mean): 81 (05 Jan 2024 11:44) (74 - 81)  RR: 18 (05 Jan 2024 12:14) (16 - 20)  SpO2: 100% (05 Jan 2024 12:14) (94% - 100%)    Parameters below as of 05 Jan 2024 12:14  Patient On (Oxygen Delivery Method): nasal cannula  O2 Flow (L/min): 2    I&O's Detail    05 Jan 2024 07:01  -  05 Jan 2024 12:38  --------------------------------------------------------  IN:    Lactated Ringers: 100 mL  Total IN: 100 mL    OUT:    Chest Tube (mL): 118 mL    Chest Tube (mL): 17 mL  Total OUT: 135 mL    Total NET: -35 mL        CHEST TUBE:  2 CT in place to suction with airleak  DEON DRAIN:  none  EPICARDIAL WIRES: none  STITCHES: none  STAPLES: none  TRUJILLO: none  CENTRAL LINE: none  MIDLINE/PICC: none  WOUND VAC: none    PHYSICAL EXAM:  General: resting comfortably in bed in NAD  Neurological: AOx3. Motor skills grossly intact  Cardiovascular: Normal S1/S2. Regular rate/rhythm. No murmurs  Respiratory: Lungs CTA bilaterally. No wheezing or rales  Gastrointestinal: +BS in all 4 quadrants. Non-distended. Soft. Non-tender  Extremities: Strength 5/5 b/l upper/lower extremities. Sensation grossly intact upper/lower extremities. No edema. No calf tenderness.  Vascular: Radial 2+bilaterally, DP 2+ b/l  Incision Sites: VATS incisions covered by dressing.       LABS:                        9.1    13.50 )-----------( 285      ( 05 Jan 2024 11:32 )             27.4       01-05    133<L>  |  103  |  47<H>  ----------------------------<  113<H>  4.6   |  20<L>  |  2.71<H>    Ca    11.1<H>      05 Jan 2024 11:32      Urinalysis Basic - ( 05 Jan 2024 11:32 )    Color: x / Appearance: x / SG: x / pH: x  Gluc: 113 mg/dL / Ketone: x  / Bili: x / Urobili: x   Blood: x / Protein: x / Nitrite: x   Leuk Esterase: x / RBC: x / WBC x   Sq Epi: x / Non Sq Epi: x / Bacteria: x      MEDICATIONS  (STANDING):  atorvastatin 10 milliGRAM(s) Oral at bedtime  bisacodyl 5 milliGRAM(s) Oral at bedtime  ceFAZolin   IVPB 2000 milliGRAM(s) IV Intermittent every 8 hours  heparin   Injectable 5000 Unit(s) SubCutaneous every 8 hours  lactated ringers Bolus 1000 milliLiter(s) IV Bolus once  lactated ringers. 1000 milliLiter(s) (75 mL/Hr) IV Continuous <Continuous>  pantoprazole    Tablet 40 milliGRAM(s) Oral before breakfast  polyethylene glycol 3350 17 Gram(s) Oral daily  senna 2 Tablet(s) Oral at bedtime    MEDICATIONS  (PRN):  acetaminophen     Tablet .. 975 milliGRAM(s) Oral every 6 hours PRN Mild Pain (1 - 3)  oxyCODONE    IR 5 milliGRAM(s) Oral every 6 hours PRN Moderate Pain (4 - 6)    RADIOLOGY & ADDITIONAL TESTS:    < from: Xray Chest 1 View- PORTABLE-Urgent (01.05.24 @ 11:54) >  IMPRESSION: Interval right thoracic surgery with removal of right pleural   effusion. Small loculated right pneumothorax. Subcutaneous emphysema.    < end of copied text >

## 2024-01-05 NOTE — CONSULT NOTE ADULT - ASSESSMENT
73 year old M, Cantonese speaking, never a smoker, with PMHx of HTN, HLD, CKD3,BPH,  Gout, hx Shingles right arm ( 10/2023), hx RLQ pain since ( 8/2023) along with poor appetite, unintentional weight loss around 20 lbs, and progressively worse hoarseness and dyspnea. recent EGD and colonoscopy unremarkable. Pt was referred by Heme Onc Dr. Edward Jensen for an initial evaluation of right pleural effusion. Pt underwent 	R VATS RA Pleural biopsy and decortication (Frozen + Necrotizing granuloma) 1/5/24  POD 0     - 9LA for HD monitoring   - airborne isolation to rule out TB  - ID consult appreciated, Team 1 Dr. Alcala  - Sputum AFBx3   - RIPE Rx +B6 per ID after sputum x3 collected   - CT x2 /f/u CXR in am   - O2 supplement as needed   - postop iv abx ppx: ceFAZolin   IVPB 2000 milliGRAM(s) IV Intermittent every 8 hours  - pain control   - acetaminophen     Tablet .. 975 milliGRAM(s) Oral every 6 hours PRN  - oxyCODONE    IR 5 milliGRAM(s) Oral every 6 hours PRN  - bowel regimen:   - bisacodyl 5 milliGRAM(s) Oral at bedtime  - polyethylene glycol 3350 17 Gram(s) Oral daily  - senna 2 Tablet(s) Oral at bedtime  - HLD: atorvastatin 10 milliGRAM(s) Oral at bedtime  - GI ppx: pantoprazole    Tablet 40 milliGRAM(s) Oral before breakfast  - DVT ppx: heparin   Injectable 5000 Unit(s) SubCutaneous every 8 hours    Contacts:   Referring Heme Onc Dr Joanne Jensen

## 2024-01-05 NOTE — CONSULT NOTE ADULT - SUBJECTIVE AND OBJECTIVE BOX
HPI:  73 year old M, Cantonese speaking, never a smoker, with PMHx of HTN, HLD, CKD3,BPH,  Gout, hx Shingles right arm ( 10/2023), hx RLQ pain since ( 8/2023) along with poor appetite, unintentional weight loss around 20 lbs, and progressively worse hoarseness and dyspnea. recent EGD and colonoscopy unremarkable. Pt was referred by Heme Onc Dr. Edward Jensen for an initial evaluation of right pleural effusion. Pt underwent 	R VATS RA Pleural biopsy and decortication (Frozen + Necrotizing granuloma) 1/5/24  POD 0     ID consult appreciated, concerning active pulmonary TB and recs: starting RIPE therapy after sputum AFBx3 collected.     Pt seen on 9LA, R CT x2   Denies pain, tolerating CLD. wife and son at bedside.             PAST MEDICAL & SURGICAL HISTORY:  HTN (hypertension)      Renal stones      Gout      Proteinuria      Hyperlipidemia      Stage 3 chronic kidney disease      BPH (benign prostatic hyperplasia)      Pleural effusion  right      History of shingles  10/2023      No significant past surgical history        Home Medications:  aspirin 81 mg oral delayed release capsule: orally once a day (05 Jan 2024 07:45)  atorvastatin 10 mg oral tablet: 1 tab(s) orally once a day (05 Jan 2024 07:45)  dexlansoprazole 60 mg oral delayed release capsule: 1 cap(s) orally once a day (05 Jan 2024 07:45)  losartan 100 mg oral tablet: 1 tab(s) orally once a day (05 Jan 2024 07:45)  metoprolol succinate 50 mg oral capsule, extended release: 1 cap(s) orally once a day (05 Jan 2024 07:45)  Nephplex Rx oral tablet: 1 tab(s) orally once a day (05 Jan 2024 07:45)  nitroglycerin 0.6 mg sublingual tablet: 1 tab(s) sublingually once a day as needed for  chest pain (05 Jan 2024 07:45)  Norvasc 5 mg oral tablet: 1 tab(s) orally once a day (05 Jan 2024 07:45)  sodium bicarbonate 650 mg oral tablet: 1 tab(s) orally once a day (05 Jan 2024 07:45)  Uloric 40 mg oral tablet: 1 tab(s) orally once a day (05 Jan 2024 07:45)    Allergies    No Known Allergies    Intolerances      FAMILY HISTORY:  No pertinent family history in first degree relatives      Social History:      REVIEW OF SYSTEMS:  CONSTITUTIONAL: No fever, weight loss  EYES: No eye pain, or discharge  ENMT:  No tinnitus, vertigo  NECK: No pain or stiffness  RESPIRATORY: No cough, No dyspnea  CARDIOVASCULAR: No chest pain, or leg swelling  GASTROINTESTINAL: No abdominal pain. No diarrhea ;No melena or hematochezia.  GENITOURINARY: No dysuria, frequency, or hematuria  NEUROLOGICAL: No numbness, or tremors  SKIN: No itching, burning, rashes, or lesions   ENDOCRINE: No heat or cold intolerance;  MUSCULOSKELETAL: No joint pain or swelling;   PSYCHIATRIC: No mood swings, or difficulty sleeping  HEME/LYMPH: No easy bruising, or bleeding gums  ALLERGY AND IMMUNOLOGIC: No hives or eczema    Diet, Clear Liquid (01-05-24 @ 07:09) [Active]      CURRENT MEDICATIONS:   acetaminophen     Tablet .. 975 milliGRAM(s) Oral every 6 hours PRN  atorvastatin 10 milliGRAM(s) Oral at bedtime  bisacodyl 5 milliGRAM(s) Oral at bedtime  ceFAZolin   IVPB 2000 milliGRAM(s) IV Intermittent every 8 hours  heparin   Injectable 5000 Unit(s) SubCutaneous every 8 hours  lactated ringers. 1000 milliLiter(s) IV Continuous <Continuous>  oxyCODONE    IR 5 milliGRAM(s) Oral every 6 hours PRN  pantoprazole    Tablet 40 milliGRAM(s) Oral before breakfast  polyethylene glycol 3350 17 Gram(s) Oral daily  senna 2 Tablet(s) Oral at bedtime      VITAL SIGNS, INS/OUTS (last 24 hours):  Vital Signs Last 24 Hrs  T(C): 36.6 (05 Jan 2024 17:14), Max: 36.6 (05 Jan 2024 17:14)  T(F): 97.8 (05 Jan 2024 17:14), Max: 97.8 (05 Jan 2024 17:14)  HR: 92 (05 Jan 2024 13:07) (78 - 92)  BP: 100/66 (05 Jan 2024 13:07) (100/66 - 125/74)  BP(mean): 78 (05 Jan 2024 13:07) (74 - 85)  RR: 16 (05 Jan 2024 13:07) (16 - 20)  SpO2: 98% (05 Jan 2024 13:07) (94% - 100%)    Parameters below as of 05 Jan 2024 13:07  Patient On (Oxygen Delivery Method): room air      I&O's Summary    05 Jan 2024 07:01  -  05 Jan 2024 18:18  --------------------------------------------------------  IN: 1450 mL / OUT: 280 mL / NET: 1170 mL        PHYSICAL EXAM:  Gen: Reclining in bed at time of exam, appears stated age  HEENT: NCAT, MMM, clear OP  Neck: supple, trachea at midline  CV: RRR, +S1/S2  Pulm: adequate respiratory effort, no increase in work of breathing  Abd: soft, NTND  Skin: warm and dry,  Ext: WWP, no LE edema  Neuro: AOx3, no gross focal neurological deficits  Psych: affect and behavior appropriate, pleasant at time of interview    BASIC LABS:                        9.1    13.50 )-----------( 285      ( 05 Jan 2024 11:32 )             27.4     01-05    133<L>  |  103  |  47<H>  ----------------------------<  113<H>  4.6   |  20<L>  |  2.71<H>    Ca    11.1<H>      05 Jan 2024 11:32        Urinalysis Basic - ( 05 Jan 2024 11:32 )    Color: x / Appearance: x / SG: x / pH: x  Gluc: 113 mg/dL / Ketone: x  / Bili: x / Urobili: x   Blood: x / Protein: x / Nitrite: x   Leuk Esterase: x / RBC: x / WBC x   Sq Epi: x / Non Sq Epi: x / Bacteria: x      CAPILLARY BLOOD GLUCOSE          OTHER LABS:        MICRODATA:    Culture - Tissue with Gram Stain (collected 05 Jan 2024 08:44)  Source: Pleura #2 Right Pleura  Gram Stain (05 Jan 2024 16:35):    No organisms seen    Rare WBC's    Culture - Body Fluid with Gram Stain (collected 05 Jan 2024 08:20)  Source: .Body Fluid right pleural fluid  Gram Stain (05 Jan 2024 16:36):    No organisms seen    Rare WBC's        IMAGING:    EKG:    #Diet - Diet, Clear Liquid (01-05-24 @ 07:09) [Active]        #DVT PPx -

## 2024-01-05 NOTE — PRE-ANESTHESIA EVALUATION ADULT - NSPROPOSEDPROCEDFT_GEN_ALL_CORE
Right VATS, robotic assisted right pleural biopsy, pleural effusion drainage, possible pleurodesis and pleurx placement

## 2024-01-05 NOTE — PROGRESS NOTE ADULT - ASSESSMENT
73 year old M, Cantonese speaking, never a smoker, with PMHx of HTN, HLD, who is referred by Dr. Edward Jensen for an initial evaluation of pleural effusion. Patient reports right lower quadrant pain since Aug 2023, along with poor appetite, unintentionally weight loss around 20lbs, fatigue, progressively worse hoarseness and dyspnea. He had Shingles right arm in Oct 2023, and was subsequently diagnosed with vocal cord paralysis. No personal history of TB infection. Pt underwent R VATS, pleural biopsy decortication, on 1/5 with Dr. Kaiser. Intraop path revealed necrotizing granuloma. Pt being worked up for TB, ID consulted. 2 CT left in place to suction with airleak.     Plan:    Neurovascular:   -Pain well controlled with current regimen. PRN's: tylenol, oxy    Cardiovascular:   -Hemodynamically stable.   -Monitor: BP, HR, tele  -HLD    -c/w lipitor    Respiratory:   -Encourage continued use of IS 10x/hr and frequent ambulation  -CXR: post op changes  -CT management    -2 CT to suction    GI:  -GI PPX:  -PO Diet  -Bowel Regimen:     Renal / :  -Continue to monitor renal function: BUN/Cr  -Monitor I/O's daily     Endocrine:    -No hx of DM or thyroid dx  -A1c:  -TSH:    Hematologic:  -CBC: H/H-  -Coagulation Panel.    ID:  -Temperature:   -CBC: WBC-  -Continue to observe for SIRS/Sepsis Syndrome.    Prophylaxis:  -DVT prophylaxis with 5000 SubQ Heparin q8h.  -Continue with SCD's b/l while patient is at rest     Disposition:  -Discharge home once patient is medically ready   73 year old M, Cantonese speaking, never a smoker, with PMHx of HTN, HLD, who is referred by Dr. Edward Jensen for an initial evaluation of pleural effusion. Patient reports right lower quadrant pain since Aug 2023, along with poor appetite, unintentionally weight loss around 20lbs, fatigue, progressively worse hoarseness and dyspnea. He had Shingles right arm in Oct 2023, and was subsequently diagnosed with vocal cord paralysis. No personal history of TB infection. Pt underwent R VATS, pleural biopsy decortication, on 1/5 with Dr. Kaiser. Intraop path revealed necrotizing granuloma. Pt being worked up for TB, ID consulted. 2 CT left in place to suction with airleak.     Plan:    Neurovascular:   -Pain well controlled with current regimen. PRN's: tylenol, oxy    Cardiovascular:   -Hemodynamically stable.   -Monitor: BP, HR, tele  -HLD    -c/w lipitor    Respiratory:   -Encourage continued use of IS 10x/hr and frequent ambulation  -CXR: post op changes  -CT management    -2 CT to remain suction     GI:  -GI PPX: protonix  -PO Diet  -Bowel Regimen: dulcolax, senna    Renal / :  -Continue to monitor renal function: BUN/Cr 47/2.71  -Monitor I/O's daily     Endocrine:    -No hx of DM or thyroid dx      Hematologic:  -CBC: H/H- 9/27  -Coagulation Panel.    ID:  -Temperature: afebrile  -CBC: WBC- 13  -necrotizing granuloma found in OR    -f/u ID reccs for TB    -AFB x 3   -Continue to observe for SIRS/Sepsis Syndrome.    Prophylaxis:  -DVT prophylaxis with 5000 SubQ Heparin q8h.  -Continue with SCD's b/l while patient is at rest     Disposition:  -Discharge home once patient is medically ready

## 2024-01-05 NOTE — CONSULT NOTE ADULT - ASSESSMENT
73 year old M, Cantonese speaking, never a smoker, with PMHx of HTN, HLD, who is referred by Dr. Edward Jensen for an initial evaluation of pleural effusion. Patient reports right lower quadrant pain since Aug 2023, along with poor appetite, unintentionally weight loss around 20lbs, fatigue, progressively worse hoarseness and dyspnea. He had Shingles right arm in Oct 2023, and was subsequently diagnosed with vocal cord paralysis. No personal history of TB infection. Pt underwent R VATS, pleural biopsy decortication, on 1/5 with Dr. Kaiser. Intraop path revealed necrotizing granuloma. ID was consulted for w/u of necrotizing granuloma, and concern for active TB.      #Necrotizing granuloma/ C/f active TB 73 year old M, Cantonese speaking, never a smoker, with PMHx of HTN, HLD, who is referred by Dr. Edward Jensen for an initial evaluation of pleural effusion. Patient reports right lower quadrant pain since Aug 2023, along with poor appetite, unintentionally weight loss around 20lbs, fatigue, progressively worse hoarseness and dyspnea. He had Shingles right arm in Oct 2023, and was subsequently diagnosed with vocal cord paralysis. No personal history of TB infection. Pt underwent R VATS, pleural biopsy decortication, on 1/5 with Dr. Kaiser. Intraop path revealed necrotizing granuloma. ID was consulted for w/u of necrotizing granuloma, and concern for active TB.      #Necrotizing granuloma/ C/f active TB. Patient w constitutional sx such as unintentional weight loss, vocal cord dysfunction, as well as dyspnea on exertion  - We would recommend AFB x 3, q8h  - Only following collection of AFB, we would recommend empiric Tuberculosis treatment  - We would recommend 600mg rifampin qd, 300mg Isoniazid qd, 1200mg qd of ethambutol qd, 1500mg qd of pyrazinimide  - We would recommend pyridoxine to prevent adverse s/e from Isoniazid therapy, 50mg qd  - We would recommend CT chest non-contrast to assess for cavitary lesions  - There are some other etiologies as opposed to M.tuberculosis, we would also recommend serum Fungitell, as well as Histoplasma Ag urine    ID will continue to follow,    RECS NOT FINAL UNTIL ATTENDING ATTESTATION

## 2024-01-05 NOTE — CONSULT NOTE ADULT - SUBJECTIVE AND OBJECTIVE BOX
Consultation Requested by:    Patient is a 73y old  Male who presents with a chief complaint of elective surgery :  Right Video-Assisted Thoracoscopic Surgery, Robotic Assisted, Right pleural biopsy, Pleural effusion drainage, possible pleurodesis and PleurX placement (05 Jan 2024 12:37)    HPI:  GUILLAUME MANNING is a 73 year old M, Cantonese speaking, never a smoker, with PMHx of HTN, HLD, who is referred by Dr. Edward Jensen for an initial evaluation of pleural effusion.  ?  Patient reports right lower quadrant pain since Aug 2023, along with poor appetite, unintentionally weight loss around 20lbs, fatigue, progressively worse hoarseness and dyspnea. He had Shingles right arm in Oct 2023, followed by generalized hives. Recent EGD and colonoscopy unremarkable. Denies chronic cough, nausea, vomiting. No personal history of TB infection.    Patient seen in same day holding area; Reports no changes to PMHx or medications since last seen by our team. Denies acute or current SOB, chest pain, palpitation, N/V/D, fever/chills, recent illness, or any other concerning symptoms.  (02 Jan 2024 16:05)      REVIEW OF SYSTEMS  All review of systems negative, except for those marked:  General:		[] Abnormal:  	[] Night Sweats		[] Fever		[] Weight Loss  Pulmonary/Cough:	[] Abnormal:  Cardiac/Chest Pain:	[] Abnormal:  Gastrointestinal:   	[] Abnormal:  Eyes:			        [] Abnormal:  ENT:		         	[] Abnormal:  Dysuria:		                [] Abnormal:  Musculoskeletal	:	[] Abnormal:  Endocrine:		        [] Abnormal:  Lymph Nodes:		[] Abnormal:  Headache:		        [] Abnormal:  Skin:			        [] Abnormal:  Allergy/Immune:       	[] Abnormal:  Psychiatric:		        [] Abnormal:  [] All other review of systems negative  [] Unable to obtain (explain):    Recent Ill Contacts:	[] No	[] Yes:  Recent Travel History:	[] No	[] Yes:  Recent Animal/Insect Exposure/Tick Bites:	[] No	[] Yes:    Allergies    No Known Allergies    Intolerances      Antimicrobials:  ceFAZolin   IVPB 2000 milliGRAM(s) IV Intermittent every 8 hours      Other Medications:  acetaminophen     Tablet .. 975 milliGRAM(s) Oral every 6 hours PRN  atorvastatin 10 milliGRAM(s) Oral at bedtime  bisacodyl 5 milliGRAM(s) Oral at bedtime  heparin   Injectable 5000 Unit(s) SubCutaneous every 8 hours  lactated ringers Bolus 1000 milliLiter(s) IV Bolus once  lactated ringers. 1000 milliLiter(s) IV Continuous <Continuous>  oxyCODONE    IR 5 milliGRAM(s) Oral every 6 hours PRN  pantoprazole    Tablet 40 milliGRAM(s) Oral before breakfast  polyethylene glycol 3350 17 Gram(s) Oral daily  senna 2 Tablet(s) Oral at bedtime      FAMILY HISTORY:  No pertinent family history in first degree relatives      PAST MEDICAL & SURGICAL HISTORY:  HTN (hypertension)      Renal stones      Gout      Proteinuria      Hyperlipidemia      Stage 3 chronic kidney disease      BPH (benign prostatic hyperplasia)      Pleural effusion  right      History of shingles  10/2023      No significant past surgical history        SOCIAL HISTORY:    IMMUNIZATIONS  [] Up to Date		[] Not Up to Date:  Recent Immunizations:	[] No	[] Yes:    Daily Height in cm: 165.1 (05 Jan 2024 08:41)    Daily   Head Circumference:  Vital Signs Last 24 Hrs  T(C): 35.7 (05 Jan 2024 13:07), Max: 36.2 (05 Jan 2024 08:41)  T(F): 96.3 (05 Jan 2024 13:07), Max: 96.3 (05 Jan 2024 13:07)  HR: 92 (05 Jan 2024 13:07) (78 - 92)  BP: 100/66 (05 Jan 2024 13:07) (100/66 - 125/74)  BP(mean): 78 (05 Jan 2024 13:07) (74 - 85)  RR: 16 (05 Jan 2024 13:07) (16 - 20)  SpO2: 98% (05 Jan 2024 13:07) (94% - 100%)    Parameters below as of 05 Jan 2024 13:07  Patient On (Oxygen Delivery Method): room air        PHYSICAL EXAM    Respiratory Support:		[] No	[] Yes:  Vasoactive medication infusion:	[] No	[] Yes:  Venous catheters:		[] No	[] Yes:  Bladder catheter:		        [] No	[] Yes:  Other catheters or tubes:	[] No	[] Yes:    Lab Results:                        9.1    13.50 )-----------( 285      ( 05 Jan 2024 11:32 )             27.4     01-05    133<L>  |  103  |  47<H>  ----------------------------<  113<H>  4.6   |  20<L>  |  2.71<H>    Ca    11.1<H>      05 Jan 2024 11:32          Urinalysis Basic - ( 05 Jan 2024 11:32 )    Color: x / Appearance: x / SG: x / pH: x  Gluc: 113 mg/dL / Ketone: x  / Bili: x / Urobili: x   Blood: x / Protein: x / Nitrite: x   Leuk Esterase: x / RBC: x / WBC x   Sq Epi: x / Non Sq Epi: x / Bacteria: x       Consultation Requested by:    Patient is a 73y old  Male who presents with a chief complaint of elective surgery :  Right Video-Assisted Thoracoscopic Surgery, Robotic Assisted, Right pleural biopsy, Pleural effusion drainage, possible pleurodesis and PleurX placement (05 Jan 2024 12:37)    HPI:  GUILLAUME MANNING is a 73 year old M, Cantonese speaking, never a smoker, with PMHx of HTN, HLD, who is referred by Dr. Edward Jensen for an initial evaluation of pleural effusion.  ?  Patient reports right lower quadrant pain since Aug 2023, along with poor appetite, unintentionally weight loss around 20lbs, fatigue, progressively worse hoarseness and dyspnea. He had Shingles right arm in Oct 2023, followed by generalized hives. Recent EGD and colonoscopy unremarkable. Denies chronic cough, nausea, vomiting. No personal history of TB infection.    Patient seen in same day holding area; Reports no changes to PMHx or medications since last seen by our team. Denies acute or current SOB, chest pain, palpitation, N/V/D, fever/chills, recent illness, or any other concerning symptoms.  (02 Jan 2024 16:05)    Interval Events: Used [employer], ID: [id number]. Patient seen with ID team. Patient reports onset of symptoms for several months, in terms of some increased lethargy as well as unintentional weight loss for three- four months. Patient himself is denying cough, but reports hoarse voice which arose around the same time. Patient's son bedside also provides additional collateral. Reports ENT eval outpatient, had scope which revealed suspicion of vocal cords closing. Otherwise also reports that CT scans done outpatient.  Patient now reports feeling better, reports fatigue. Is denying fever, chills, night-sweats, cough currently, SOB, abdominal pain, nausea, vomiting, diarrhea, dysuria, hematuria, or new rash.        Allergies    No Known Allergies    Intolerances      Antimicrobials:  ceFAZolin   IVPB 2000 milliGRAM(s) IV Intermittent every 8 hours      Other Medications:  acetaminophen     Tablet .. 975 milliGRAM(s) Oral every 6 hours PRN  atorvastatin 10 milliGRAM(s) Oral at bedtime  bisacodyl 5 milliGRAM(s) Oral at bedtime  heparin   Injectable 5000 Unit(s) SubCutaneous every 8 hours  lactated ringers Bolus 1000 milliLiter(s) IV Bolus once  lactated ringers. 1000 milliLiter(s) IV Continuous <Continuous>  oxyCODONE    IR 5 milliGRAM(s) Oral every 6 hours PRN  pantoprazole    Tablet 40 milliGRAM(s) Oral before breakfast  polyethylene glycol 3350 17 Gram(s) Oral daily  senna 2 Tablet(s) Oral at bedtime      FAMILY HISTORY:  No pertinent family history in first degree relatives      PAST MEDICAL & SURGICAL HISTORY:  HTN (hypertension)      Renal stones      Gout      Proteinuria      Hyperlipidemia      Stage 3 chronic kidney disease      BPH (benign prostatic hyperplasia)      Pleural effusion  right      History of shingles  10/2023      No significant past surgical history        SOCIAL HISTORY:  Reports born in [place], moved here in 1985  Reports questionable hx of ever tested/ exposed to TB  Lives w family      IMMUNIZATIONS  [] Up to Date		[] Not Up to Date:  Recent Immunizations:	[] No	[] Yes:    Daily Height in cm: 165.1 (05 Jan 2024 08:41)    Daily   Head Circumference:  Vital Signs Last 24 Hrs  T(C): 35.7 (05 Jan 2024 13:07), Max: 36.2 (05 Jan 2024 08:41)  T(F): 96.3 (05 Jan 2024 13:07), Max: 96.3 (05 Jan 2024 13:07)  HR: 92 (05 Jan 2024 13:07) (78 - 92)  BP: 100/66 (05 Jan 2024 13:07) (100/66 - 125/74)  BP(mean): 78 (05 Jan 2024 13:07) (74 - 85)  RR: 16 (05 Jan 2024 13:07) (16 - 20)  SpO2: 98% (05 Jan 2024 13:07) (94% - 100%)    Parameters below as of 05 Jan 2024 13:07  Patient On (Oxygen Delivery Method): room air        PHYSICAL EXAM:  Constitutional: AAOx3. Appears cachectic.  HEENT: NC/AT  Respiratory: Rhonchi prominent RMl/RLL. No crackles or wheezes. Chest tube in place.  Cardiovascular: RRR, normal S1 and S2, no m/r/g.   Gastrointestinal: +BS, soft NTND, no guarding or rebound tenderness, no palpable masses   Extremities: wwp; no cyanosis, clubbing or edema.   Vascular: Pulses equal and strong throughout.   Neurological: AAOx3  Skin: No gross skin abnormalities or rashes        Lab Results:                        9.1    13.50 )-----------( 285      ( 05 Jan 2024 11:32 )             27.4     01-05    133<L>  |  103  |  47<H>  ----------------------------<  113<H>  4.6   |  20<L>  |  2.71<H>    Ca    11.1<H>      05 Jan 2024 11:32          Urinalysis Basic - ( 05 Jan 2024 11:32 )    Color: x / Appearance: x / SG: x / pH: x  Gluc: 113 mg/dL / Ketone: x  / Bili: x / Urobili: x   Blood: x / Protein: x / Nitrite: x   Leuk Esterase: x / RBC: x / WBC x   Sq Epi: x / Non Sq Epi: x / Bacteria: x       Consultation Requested by:    Patient is a 73y old  Male who presents with a chief complaint of elective surgery :  Right Video-Assisted Thoracoscopic Surgery, Robotic Assisted, Right pleural biopsy, Pleural effusion drainage, possible pleurodesis and PleurX placement (05 Jan 2024 12:37)    HPI:  GUILLAUME MANNING is a 73 year old M, Cantonese speaking, never a smoker, with PMHx of HTN, HLD, who is referred by Dr. Edward Jensen for an initial evaluation of pleural effusion.  ?  Patient reports right lower quadrant pain since Aug 2023, along with poor appetite, unintentionally weight loss around 20lbs, fatigue, progressively worse hoarseness and dyspnea. He had Shingles right arm in Oct 2023, followed by generalized hives. Recent EGD and colonoscopy unremarkable. Denies chronic cough, nausea, vomiting. No personal history of TB infection.    Patient seen in same day holding area; Reports no changes to PMHx or medications since last seen by our team. Denies acute or current SOB, chest pain, palpitation, N/V/D, fever/chills, recent illness, or any other concerning symptoms.  (02 Jan 2024 16:05)    Interval Events: Used [employer], ID: [id number]. Patient seen with ID team. Patient reports onset of symptoms for several months, in terms of some increased lethargy as well as unintentional weight loss for three- four months. Patient himself is denying cough, but reports hoarse voice which arose around the same time. Patient's son bedside also provides additional collateral. Reports ENT eval outpatient, had scope which revealed suspicion of vocal cords closing. Otherwise also reports that CT scans done outpatient.  Patient now reports feeling better, reports fatigue. Is denying fever, chills, night-sweats, cough currently, SOB, abdominal pain, nausea, vomiting, diarrhea, dysuria, hematuria, or new rash.        Allergies    No Known Allergies    Intolerances      Antimicrobials:  ceFAZolin   IVPB 2000 milliGRAM(s) IV Intermittent every 8 hours      Other Medications:  acetaminophen     Tablet .. 975 milliGRAM(s) Oral every 6 hours PRN  atorvastatin 10 milliGRAM(s) Oral at bedtime  bisacodyl 5 milliGRAM(s) Oral at bedtime  heparin   Injectable 5000 Unit(s) SubCutaneous every 8 hours  lactated ringers Bolus 1000 milliLiter(s) IV Bolus once  lactated ringers. 1000 milliLiter(s) IV Continuous <Continuous>  oxyCODONE    IR 5 milliGRAM(s) Oral every 6 hours PRN  pantoprazole    Tablet 40 milliGRAM(s) Oral before breakfast  polyethylene glycol 3350 17 Gram(s) Oral daily  senna 2 Tablet(s) Oral at bedtime      FAMILY HISTORY:  No pertinent family history in first degree relatives      PAST MEDICAL & SURGICAL HISTORY:  HTN (hypertension)      Renal stones      Gout      Proteinuria      Hyperlipidemia      Stage 3 chronic kidney disease      BPH (benign prostatic hyperplasia)      Pleural effusion  right      History of shingles  10/2023      No significant past surgical history        SOCIAL HISTORY:  Reports born in [place], moved here in 1985  Reports questionable hx of ever tested/ exposed to TB  Lives w family      IMMUNIZATIONS  [] Up to Date		[] Not Up to Date:  Recent Immunizations:	[] No	[] Yes:    Daily Height in cm: 165.1 (05 Jan 2024 08:41)    Daily   Head Circumference:  Vital Signs Last 24 Hrs  T(C): 35.7 (05 Jan 2024 13:07), Max: 36.2 (05 Jan 2024 08:41)  T(F): 96.3 (05 Jan 2024 13:07), Max: 96.3 (05 Jan 2024 13:07)  HR: 92 (05 Jan 2024 13:07) (78 - 92)  BP: 100/66 (05 Jan 2024 13:07) (100/66 - 125/74)  BP(mean): 78 (05 Jan 2024 13:07) (74 - 85)  RR: 16 (05 Jan 2024 13:07) (16 - 20)  SpO2: 98% (05 Jan 2024 13:07) (94% - 100%)    Parameters below as of 05 Jan 2024 13:07  Patient On (Oxygen Delivery Method): room air        PHYSICAL EXAM:  Constitutional: AAOx3. Appears cachectic.  HEENT: NC/AT  Respiratory: Rhonchi prominent RMl/RLL. No crackles or wheezes. Chest tube in place.  Cardiovascular: RRR, normal S1 and S2, no m/r/g.   Gastrointestinal: +BS, soft NTND, no guarding or rebound tenderness, no palpable masses   Extremities: wwp; no cyanosis, clubbing or edema.   Vascular: Pulses equal and strong throughout.   Neurological: AAOx3  Skin: No gross skin abnormalities or rashes        Lab Results:                        9.1    13.50 )-----------( 285      ( 05 Jan 2024 11:32 )             27.4     01-05    133<L>  |  103  |  47<H>  ----------------------------<  113<H>  4.6   |  20<L>  |  2.71<H>    Ca    11.1<H>      05 Jan 2024 11:32          Urinalysis Basic - ( 05 Jan 2024 11:32 )    Color: x / Appearance: x / SG: x / pH: x  Gluc: 113 mg/dL / Ketone: x  / Bili: x / Urobili: x   Blood: x / Protein: x / Nitrite: x   Leuk Esterase: x / RBC: x / WBC x   Sq Epi: x / Non Sq Epi: x / Bacteria: x

## 2024-01-06 LAB
ALBUMIN SERPL ELPH-MCNC: 3.3 G/DL — SIGNIFICANT CHANGE UP (ref 3.3–5)
ALBUMIN SERPL ELPH-MCNC: 3.3 G/DL — SIGNIFICANT CHANGE UP (ref 3.3–5)
ALP SERPL-CCNC: 64 U/L — SIGNIFICANT CHANGE UP (ref 40–120)
ALP SERPL-CCNC: 64 U/L — SIGNIFICANT CHANGE UP (ref 40–120)
ALT FLD-CCNC: 7 U/L — LOW (ref 10–45)
ALT FLD-CCNC: 7 U/L — LOW (ref 10–45)
ANION GAP SERPL CALC-SCNC: 14 MMOL/L — SIGNIFICANT CHANGE UP (ref 5–17)
ANION GAP SERPL CALC-SCNC: 14 MMOL/L — SIGNIFICANT CHANGE UP (ref 5–17)
ANION GAP SERPL CALC-SCNC: 15 MMOL/L — SIGNIFICANT CHANGE UP (ref 5–17)
ANION GAP SERPL CALC-SCNC: 15 MMOL/L — SIGNIFICANT CHANGE UP (ref 5–17)
APPEARANCE UR: CLEAR — SIGNIFICANT CHANGE UP
APPEARANCE UR: CLEAR — SIGNIFICANT CHANGE UP
AST SERPL-CCNC: 17 U/L — SIGNIFICANT CHANGE UP (ref 10–40)
AST SERPL-CCNC: 17 U/L — SIGNIFICANT CHANGE UP (ref 10–40)
BASOPHILS # BLD AUTO: 0.02 K/UL — SIGNIFICANT CHANGE UP (ref 0–0.2)
BASOPHILS # BLD AUTO: 0.02 K/UL — SIGNIFICANT CHANGE UP (ref 0–0.2)
BASOPHILS NFR BLD AUTO: 0.1 % — SIGNIFICANT CHANGE UP (ref 0–2)
BASOPHILS NFR BLD AUTO: 0.1 % — SIGNIFICANT CHANGE UP (ref 0–2)
BILIRUB SERPL-MCNC: 0.2 MG/DL — SIGNIFICANT CHANGE UP (ref 0.2–1.2)
BILIRUB SERPL-MCNC: 0.2 MG/DL — SIGNIFICANT CHANGE UP (ref 0.2–1.2)
BILIRUB UR-MCNC: NEGATIVE — SIGNIFICANT CHANGE UP
BILIRUB UR-MCNC: NEGATIVE — SIGNIFICANT CHANGE UP
BUN SERPL-MCNC: 36 MG/DL — HIGH (ref 7–23)
BUN SERPL-MCNC: 36 MG/DL — HIGH (ref 7–23)
BUN SERPL-MCNC: 43 MG/DL — HIGH (ref 7–23)
BUN SERPL-MCNC: 43 MG/DL — HIGH (ref 7–23)
CALCIUM SERPL-MCNC: 10.1 MG/DL — SIGNIFICANT CHANGE UP (ref 8.4–10.5)
CALCIUM SERPL-MCNC: 10.1 MG/DL — SIGNIFICANT CHANGE UP (ref 8.4–10.5)
CALCIUM SERPL-MCNC: 11.1 MG/DL — HIGH (ref 8.4–10.5)
CALCIUM SERPL-MCNC: 11.1 MG/DL — HIGH (ref 8.4–10.5)
CHLORIDE SERPL-SCNC: 106 MMOL/L — SIGNIFICANT CHANGE UP (ref 96–108)
CHLORIDE SERPL-SCNC: 106 MMOL/L — SIGNIFICANT CHANGE UP (ref 96–108)
CHLORIDE SERPL-SCNC: 109 MMOL/L — HIGH (ref 96–108)
CHLORIDE SERPL-SCNC: 109 MMOL/L — HIGH (ref 96–108)
CO2 SERPL-SCNC: 19 MMOL/L — LOW (ref 22–31)
CO2 SERPL-SCNC: 19 MMOL/L — LOW (ref 22–31)
CO2 SERPL-SCNC: 20 MMOL/L — LOW (ref 22–31)
CO2 SERPL-SCNC: 20 MMOL/L — LOW (ref 22–31)
COLOR SPEC: YELLOW — SIGNIFICANT CHANGE UP
COLOR SPEC: YELLOW — SIGNIFICANT CHANGE UP
CREAT ?TM UR-MCNC: 40 MG/DL — SIGNIFICANT CHANGE UP
CREAT ?TM UR-MCNC: 40 MG/DL — SIGNIFICANT CHANGE UP
CREAT SERPL-MCNC: 2.31 MG/DL — HIGH (ref 0.5–1.3)
CREAT SERPL-MCNC: 2.31 MG/DL — HIGH (ref 0.5–1.3)
CREAT SERPL-MCNC: 2.52 MG/DL — HIGH (ref 0.5–1.3)
CREAT SERPL-MCNC: 2.52 MG/DL — HIGH (ref 0.5–1.3)
DIFF PNL FLD: NEGATIVE — SIGNIFICANT CHANGE UP
DIFF PNL FLD: NEGATIVE — SIGNIFICANT CHANGE UP
EGFR: 27 ML/MIN/1.73M2 — LOW
EGFR: 27 ML/MIN/1.73M2 — LOW
EGFR: 29 ML/MIN/1.73M2 — LOW
EGFR: 29 ML/MIN/1.73M2 — LOW
EOSINOPHIL # BLD AUTO: 0 K/UL — SIGNIFICANT CHANGE UP (ref 0–0.5)
EOSINOPHIL # BLD AUTO: 0 K/UL — SIGNIFICANT CHANGE UP (ref 0–0.5)
EOSINOPHIL NFR BLD AUTO: 0 % — SIGNIFICANT CHANGE UP (ref 0–6)
EOSINOPHIL NFR BLD AUTO: 0 % — SIGNIFICANT CHANGE UP (ref 0–6)
GLUCOSE BLDC GLUCOMTR-MCNC: 103 MG/DL — HIGH (ref 70–99)
GLUCOSE BLDC GLUCOMTR-MCNC: 103 MG/DL — HIGH (ref 70–99)
GLUCOSE BLDC GLUCOMTR-MCNC: 116 MG/DL — HIGH (ref 70–99)
GLUCOSE BLDC GLUCOMTR-MCNC: 116 MG/DL — HIGH (ref 70–99)
GLUCOSE BLDC GLUCOMTR-MCNC: 132 MG/DL — HIGH (ref 70–99)
GLUCOSE BLDC GLUCOMTR-MCNC: 132 MG/DL — HIGH (ref 70–99)
GLUCOSE BLDC GLUCOMTR-MCNC: 143 MG/DL — HIGH (ref 70–99)
GLUCOSE BLDC GLUCOMTR-MCNC: 143 MG/DL — HIGH (ref 70–99)
GLUCOSE BLDC GLUCOMTR-MCNC: 162 MG/DL — HIGH (ref 70–99)
GLUCOSE BLDC GLUCOMTR-MCNC: 162 MG/DL — HIGH (ref 70–99)
GLUCOSE BLDC GLUCOMTR-MCNC: 256 MG/DL — HIGH (ref 70–99)
GLUCOSE BLDC GLUCOMTR-MCNC: 256 MG/DL — HIGH (ref 70–99)
GLUCOSE SERPL-MCNC: 110 MG/DL — HIGH (ref 70–99)
GLUCOSE SERPL-MCNC: 110 MG/DL — HIGH (ref 70–99)
GLUCOSE SERPL-MCNC: 119 MG/DL — HIGH (ref 70–99)
GLUCOSE SERPL-MCNC: 119 MG/DL — HIGH (ref 70–99)
GLUCOSE UR QL: NEGATIVE MG/DL — SIGNIFICANT CHANGE UP
GLUCOSE UR QL: NEGATIVE MG/DL — SIGNIFICANT CHANGE UP
HCT VFR BLD CALC: 28 % — LOW (ref 39–50)
HCT VFR BLD CALC: 28 % — LOW (ref 39–50)
HCV AB S/CO SERPL IA: 0.08 S/CO — SIGNIFICANT CHANGE UP
HCV AB S/CO SERPL IA: 0.08 S/CO — SIGNIFICANT CHANGE UP
HCV AB SERPL-IMP: SIGNIFICANT CHANGE UP
HCV AB SERPL-IMP: SIGNIFICANT CHANGE UP
HGB BLD-MCNC: 9.1 G/DL — LOW (ref 13–17)
HGB BLD-MCNC: 9.1 G/DL — LOW (ref 13–17)
IMM GRANULOCYTES NFR BLD AUTO: 0.6 % — SIGNIFICANT CHANGE UP (ref 0–0.9)
IMM GRANULOCYTES NFR BLD AUTO: 0.6 % — SIGNIFICANT CHANGE UP (ref 0–0.9)
KETONES UR-MCNC: NEGATIVE MG/DL — SIGNIFICANT CHANGE UP
KETONES UR-MCNC: NEGATIVE MG/DL — SIGNIFICANT CHANGE UP
LEUKOCYTE ESTERASE UR-ACNC: NEGATIVE — SIGNIFICANT CHANGE UP
LEUKOCYTE ESTERASE UR-ACNC: NEGATIVE — SIGNIFICANT CHANGE UP
LYMPHOCYTES # BLD AUTO: 11.8 % — LOW (ref 13–44)
LYMPHOCYTES # BLD AUTO: 11.8 % — LOW (ref 13–44)
LYMPHOCYTES # BLD AUTO: 2.01 K/UL — SIGNIFICANT CHANGE UP (ref 1–3.3)
LYMPHOCYTES # BLD AUTO: 2.01 K/UL — SIGNIFICANT CHANGE UP (ref 1–3.3)
MAGNESIUM SERPL-MCNC: 1.9 MG/DL — SIGNIFICANT CHANGE UP (ref 1.6–2.6)
MAGNESIUM SERPL-MCNC: 1.9 MG/DL — SIGNIFICANT CHANGE UP (ref 1.6–2.6)
MCHC RBC-ENTMCNC: 29.4 PG — SIGNIFICANT CHANGE UP (ref 27–34)
MCHC RBC-ENTMCNC: 29.4 PG — SIGNIFICANT CHANGE UP (ref 27–34)
MCHC RBC-ENTMCNC: 32.5 GM/DL — SIGNIFICANT CHANGE UP (ref 32–36)
MCHC RBC-ENTMCNC: 32.5 GM/DL — SIGNIFICANT CHANGE UP (ref 32–36)
MCV RBC AUTO: 90.3 FL — SIGNIFICANT CHANGE UP (ref 80–100)
MCV RBC AUTO: 90.3 FL — SIGNIFICANT CHANGE UP (ref 80–100)
MONOCYTES # BLD AUTO: 1.1 K/UL — HIGH (ref 0–0.9)
MONOCYTES # BLD AUTO: 1.1 K/UL — HIGH (ref 0–0.9)
MONOCYTES NFR BLD AUTO: 6.5 % — SIGNIFICANT CHANGE UP (ref 2–14)
MONOCYTES NFR BLD AUTO: 6.5 % — SIGNIFICANT CHANGE UP (ref 2–14)
NEUTROPHILS # BLD AUTO: 13.78 K/UL — HIGH (ref 1.8–7.4)
NEUTROPHILS # BLD AUTO: 13.78 K/UL — HIGH (ref 1.8–7.4)
NEUTROPHILS NFR BLD AUTO: 81 % — HIGH (ref 43–77)
NEUTROPHILS NFR BLD AUTO: 81 % — HIGH (ref 43–77)
NIGHT BLUE STAIN TISS: SIGNIFICANT CHANGE UP
NITRITE UR-MCNC: NEGATIVE — SIGNIFICANT CHANGE UP
NITRITE UR-MCNC: NEGATIVE — SIGNIFICANT CHANGE UP
NRBC # BLD: 0 /100 WBCS — SIGNIFICANT CHANGE UP (ref 0–0)
NRBC # BLD: 0 /100 WBCS — SIGNIFICANT CHANGE UP (ref 0–0)
OSMOLALITY UR: 391 MOSM/KG — SIGNIFICANT CHANGE UP (ref 300–900)
OSMOLALITY UR: 391 MOSM/KG — SIGNIFICANT CHANGE UP (ref 300–900)
PH UR: 7 — SIGNIFICANT CHANGE UP (ref 5–8)
PH UR: 7 — SIGNIFICANT CHANGE UP (ref 5–8)
PLATELET # BLD AUTO: 327 K/UL — SIGNIFICANT CHANGE UP (ref 150–400)
PLATELET # BLD AUTO: 327 K/UL — SIGNIFICANT CHANGE UP (ref 150–400)
POTASSIUM SERPL-MCNC: 4.5 MMOL/L — SIGNIFICANT CHANGE UP (ref 3.5–5.3)
POTASSIUM SERPL-MCNC: 4.5 MMOL/L — SIGNIFICANT CHANGE UP (ref 3.5–5.3)
POTASSIUM SERPL-MCNC: 5.4 MMOL/L — HIGH (ref 3.5–5.3)
POTASSIUM SERPL-MCNC: 5.4 MMOL/L — HIGH (ref 3.5–5.3)
POTASSIUM SERPL-SCNC: 4.5 MMOL/L — SIGNIFICANT CHANGE UP (ref 3.5–5.3)
POTASSIUM SERPL-SCNC: 4.5 MMOL/L — SIGNIFICANT CHANGE UP (ref 3.5–5.3)
POTASSIUM SERPL-SCNC: 5.4 MMOL/L — HIGH (ref 3.5–5.3)
POTASSIUM SERPL-SCNC: 5.4 MMOL/L — HIGH (ref 3.5–5.3)
POTASSIUM UR-SCNC: 32 MMOL/L — SIGNIFICANT CHANGE UP
POTASSIUM UR-SCNC: 32 MMOL/L — SIGNIFICANT CHANGE UP
PROT ?TM UR-MCNC: 13 MG/DL — HIGH (ref 0–12)
PROT ?TM UR-MCNC: 13 MG/DL — HIGH (ref 0–12)
PROT SERPL-MCNC: 7.1 G/DL — SIGNIFICANT CHANGE UP (ref 6–8.3)
PROT SERPL-MCNC: 7.1 G/DL — SIGNIFICANT CHANGE UP (ref 6–8.3)
PROT UR-MCNC: SIGNIFICANT CHANGE UP MG/DL
PROT UR-MCNC: SIGNIFICANT CHANGE UP MG/DL
PROT/CREAT UR-RTO: 0.3 RATIO — HIGH (ref 0–0.2)
PROT/CREAT UR-RTO: 0.3 RATIO — HIGH (ref 0–0.2)
PTH-INTACT IO % DIF SERPL: 11.5 PG/ML — LOW (ref 15–65)
PTH-INTACT IO % DIF SERPL: 11.5 PG/ML — LOW (ref 15–65)
RBC # BLD: 3.1 M/UL — LOW (ref 4.2–5.8)
RBC # BLD: 3.1 M/UL — LOW (ref 4.2–5.8)
RBC # FLD: 16.7 % — HIGH (ref 10.3–14.5)
RBC # FLD: 16.7 % — HIGH (ref 10.3–14.5)
SODIUM SERPL-SCNC: 140 MMOL/L — SIGNIFICANT CHANGE UP (ref 135–145)
SODIUM SERPL-SCNC: 140 MMOL/L — SIGNIFICANT CHANGE UP (ref 135–145)
SODIUM SERPL-SCNC: 143 MMOL/L — SIGNIFICANT CHANGE UP (ref 135–145)
SODIUM SERPL-SCNC: 143 MMOL/L — SIGNIFICANT CHANGE UP (ref 135–145)
SODIUM UR-SCNC: 91 MMOL/L — SIGNIFICANT CHANGE UP
SODIUM UR-SCNC: 91 MMOL/L — SIGNIFICANT CHANGE UP
SP GR SPEC: 1.01 — SIGNIFICANT CHANGE UP (ref 1–1.03)
SP GR SPEC: 1.01 — SIGNIFICANT CHANGE UP (ref 1–1.03)
SPECIMEN SOURCE: SIGNIFICANT CHANGE UP
UROBILINOGEN FLD QL: 0.2 MG/DL — SIGNIFICANT CHANGE UP (ref 0.2–1)
UROBILINOGEN FLD QL: 0.2 MG/DL — SIGNIFICANT CHANGE UP (ref 0.2–1)
UUN UR-MCNC: 394 MG/DL — SIGNIFICANT CHANGE UP
UUN UR-MCNC: 394 MG/DL — SIGNIFICANT CHANGE UP
WBC # BLD: 17.01 K/UL — HIGH (ref 3.8–10.5)
WBC # BLD: 17.01 K/UL — HIGH (ref 3.8–10.5)
WBC # FLD AUTO: 17.01 K/UL — HIGH (ref 3.8–10.5)
WBC # FLD AUTO: 17.01 K/UL — HIGH (ref 3.8–10.5)

## 2024-01-06 PROCEDURE — 99223 1ST HOSP IP/OBS HIGH 75: CPT

## 2024-01-06 PROCEDURE — 99233 SBSQ HOSP IP/OBS HIGH 50: CPT

## 2024-01-06 PROCEDURE — 71045 X-RAY EXAM CHEST 1 VIEW: CPT | Mod: 26

## 2024-01-06 RX ORDER — FAMOTIDINE 10 MG/ML
20 INJECTION INTRAVENOUS
Refills: 0 | Status: DISCONTINUED | OUTPATIENT
Start: 2024-01-06 | End: 2024-01-06

## 2024-01-06 RX ORDER — ETHAMBUTOL HYDROCHLORIDE 400 MG/1
1200 TABLET, FILM COATED ORAL DAILY
Refills: 0 | Status: DISCONTINUED | OUTPATIENT
Start: 2024-01-06 | End: 2024-01-08

## 2024-01-06 RX ORDER — FAMOTIDINE 10 MG/ML
10 INJECTION INTRAVENOUS DAILY
Refills: 0 | Status: DISCONTINUED | OUTPATIENT
Start: 2024-01-06 | End: 2024-01-17

## 2024-01-06 RX ORDER — SODIUM ZIRCONIUM CYCLOSILICATE 10 G/10G
10 POWDER, FOR SUSPENSION ORAL ONCE
Refills: 0 | Status: COMPLETED | OUTPATIENT
Start: 2024-01-06 | End: 2024-01-06

## 2024-01-06 RX ORDER — PYRAZINAMIDE 500 MG/1
1500 TABLET ORAL DAILY
Refills: 0 | Status: DISCONTINUED | OUTPATIENT
Start: 2024-01-06 | End: 2024-01-08

## 2024-01-06 RX ORDER — HEXAVITAMINS
300 TABLET ORAL DAILY
Refills: 0 | Status: DISCONTINUED | OUTPATIENT
Start: 2024-01-06 | End: 2024-01-17

## 2024-01-06 RX ORDER — DEXTROSE 50 % IN WATER 50 %
50 SYRINGE (ML) INTRAVENOUS ONCE
Refills: 0 | Status: COMPLETED | OUTPATIENT
Start: 2024-01-06 | End: 2024-01-06

## 2024-01-06 RX ORDER — SODIUM CHLORIDE 9 MG/ML
1000 INJECTION INTRAMUSCULAR; INTRAVENOUS; SUBCUTANEOUS ONCE
Refills: 0 | Status: COMPLETED | OUTPATIENT
Start: 2024-01-06 | End: 2024-01-06

## 2024-01-06 RX ORDER — HALOPERIDOL DECANOATE 100 MG/ML
5 INJECTION INTRAMUSCULAR ONCE
Refills: 0 | Status: COMPLETED | OUTPATIENT
Start: 2024-01-06 | End: 2024-01-05

## 2024-01-06 RX ORDER — INSULIN HUMAN 100 [IU]/ML
5 INJECTION, SOLUTION SUBCUTANEOUS ONCE
Refills: 0 | Status: COMPLETED | OUTPATIENT
Start: 2024-01-06 | End: 2024-01-06

## 2024-01-06 RX ORDER — METOPROLOL TARTRATE 50 MG
25 TABLET ORAL DAILY
Refills: 0 | Status: DISCONTINUED | OUTPATIENT
Start: 2024-01-06 | End: 2024-01-08

## 2024-01-06 RX ADMIN — HEPARIN SODIUM 5000 UNIT(S): 5000 INJECTION INTRAVENOUS; SUBCUTANEOUS at 22:15

## 2024-01-06 RX ADMIN — INSULIN HUMAN 5 UNIT(S): 100 INJECTION, SOLUTION SUBCUTANEOUS at 09:34

## 2024-01-06 RX ADMIN — ETHAMBUTOL HYDROCHLORIDE 1200 MILLIGRAM(S): 400 TABLET, FILM COATED ORAL at 18:59

## 2024-01-06 RX ADMIN — HEPARIN SODIUM 5000 UNIT(S): 5000 INJECTION INTRAVENOUS; SUBCUTANEOUS at 07:09

## 2024-01-06 RX ADMIN — PANTOPRAZOLE SODIUM 40 MILLIGRAM(S): 20 TABLET, DELAYED RELEASE ORAL at 07:09

## 2024-01-06 RX ADMIN — Medication 5 MILLIGRAM(S): at 22:15

## 2024-01-06 RX ADMIN — Medication 100 MILLIGRAM(S): at 15:33

## 2024-01-06 RX ADMIN — Medication 25 MILLIGRAM(S): at 18:59

## 2024-01-06 RX ADMIN — Medication 975 MILLIGRAM(S): at 02:54

## 2024-01-06 RX ADMIN — Medication 975 MILLIGRAM(S): at 18:13

## 2024-01-06 RX ADMIN — Medication 50 MILLILITER(S): at 09:34

## 2024-01-06 RX ADMIN — SODIUM ZIRCONIUM CYCLOSILICATE 10 GRAM(S): 10 POWDER, FOR SUSPENSION ORAL at 09:35

## 2024-01-06 RX ADMIN — SENNA PLUS 2 TABLET(S): 8.6 TABLET ORAL at 22:15

## 2024-01-06 RX ADMIN — ATORVASTATIN CALCIUM 10 MILLIGRAM(S): 80 TABLET, FILM COATED ORAL at 22:15

## 2024-01-06 RX ADMIN — HEPARIN SODIUM 5000 UNIT(S): 5000 INJECTION INTRAVENOUS; SUBCUTANEOUS at 14:50

## 2024-01-06 RX ADMIN — Medication 975 MILLIGRAM(S): at 02:22

## 2024-01-06 RX ADMIN — Medication 975 MILLIGRAM(S): at 10:42

## 2024-01-06 RX ADMIN — PYRAZINAMIDE 1500 MILLIGRAM(S): 500 TABLET ORAL at 19:00

## 2024-01-06 RX ADMIN — Medication 975 MILLIGRAM(S): at 10:13

## 2024-01-06 RX ADMIN — Medication 300 MILLIGRAM(S): at 18:59

## 2024-01-06 RX ADMIN — SODIUM CHLORIDE 1000 MILLILITER(S): 9 INJECTION INTRAMUSCULAR; INTRAVENOUS; SUBCUTANEOUS at 19:00

## 2024-01-06 RX ADMIN — Medication 100 MILLIGRAM(S): at 07:08

## 2024-01-06 RX ADMIN — Medication 975 MILLIGRAM(S): at 16:57

## 2024-01-06 NOTE — PROGRESS NOTE ADULT - ASSESSMENT
73 year old M, Cantonese speaking, never a smoker, with PMHx of HTN, HLD, CKD3,BPH,  Gout, hx Shingles right arm ( 10/2023), hx RLQ pain since ( 8/2023) along with poor appetite, unintentional weight loss around 20 lbs, and progressively worse hoarseness and dyspnea. recent EGD and colonoscopy unremarkable. Pt was referred by Heme Onc Dr. Edward Jensen for an initial evaluation of right pleural effusion. Pt underwent 	R VATS RA Pleural biopsy and decortication (Frozen + Necrotizing granuloma) 1/5/24  POD 1     - 9LA for HD monitoring   - airborne isolation to rule out TB  - ID consult appreciated, Team 1 Dr. Alcala  - Sputum AFBx3 ( q8hr apart)   - RIPE Rx +B6 per ID after sputum x3 collected   - R CT x2 /f/u CXR in am reviewed   - O2 supplement as needed   - Renal consult for NARA: S Cr baseline 1.91( 12/15/23)-> 2.71 ( 1/5)-> 2.52( 1/6), check bladder scan for PVR, UA nd urine lytes, consider renal US, trend BMP for S Cr and avoid nephrotoxic meds/NSAIDS/iv contrast dye   - postop iv abx ppx: ceFAZolin   IVPB 2000 milliGRAM(s) IV Intermittent every 8 hours  - pain control   - acetaminophen     Tablet .. 975 milliGRAM(s) Oral every 6 hours PRN  - oxyCODONE    IR 5 milliGRAM(s) Oral every 6 hours PRN  - bowel regimen:   - bisacodyl 5 milliGRAM(s) Oral at bedtime  - polyethylene glycol 3350 17 Gram(s) Oral daily  - senna 2 Tablet(s) Oral at bedtime  - HLD: atorvastatin 10 milliGRAM(s) Oral at bedtime  - GI ppx: pantoprazole    Tablet 40 milliGRAM(s) Oral before breakfast  - DVT ppx: heparin   Injectable 5000 Unit(s) SubCutaneous every 8 hours    Contacts:   Referring Heme Onc Dr Joanne Jensen     POC as d/w family and CTS [facility]

## 2024-01-06 NOTE — CONSULT NOTE ADULT - ATTENDING COMMENTS
73y M w/ hx of HTN s/p R VATS procedure for pleural biopsy decortication with pathology showing necrotizing granuloma and hospital course c/b NARA on CKD w/ peaked sCr at 2.71.   NARA possibly pre renal/ATN on previous hx of CKD  Gradual improvement on NARA after trial of IVF  Suggest to repeat UA w/ microscopy, check ANCA serologies (PR3, MPO, C-ANCA and P-ANCA) to exclude other potential disease mimics  Discussed with primary team
Agree with above.  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  [place] high risk of exposure.  Endorses months of night sweats, fatigue and dyspnea.  Pathology report with necrotizing granuloma.  Recommend sending AFB sputum q8hrs x 3. It is very important to send these off so that we can run susceptibility testing in the future as there may be some drug resistance. Once all these are sent please start:   mg PO daily, Rifampin 600 mg PO daily, Ethambutol 1200 mg PO daily and Pyrazinamide 1500 mg PO daily.  Add Pyridoxine (Vit B6) 50 mg PO daily to prevent INH peripheral neuropathy.  Send serum quantiferon.  Send funigtel and urine histoplasma antigen

## 2024-01-06 NOTE — CONSULT NOTE ADULT - SUBJECTIVE AND OBJECTIVE BOX
HPI:  GUILLAUME MANNING is a 73 year old M, Cantonese speaking, never a smoker, with PMHx of HTN, HLD, who is referred by Dr. Edward Jensen for an initial evaluation of pleural effusion.  ?  Patient reports right lower quadrant pain since Aug 2023, along with poor appetite, unintentionally weight loss around 20lbs, fatigue, progressively worse hoarseness and dyspnea. He had Shingles right arm in Oct 2023, followed by generalized hives. Recent EGD and colonoscopy unremarkable. Denies chronic cough, nausea, vomiting. No personal history of TB infection.    Patient seen in same day holding area; Reports no changes to PMHx or medications since last seen by our team. Denies acute or current SOB, chest pain, palpitation, N/V/D, fever/chills, recent illness, or any other concerning symptoms.  (2024 16:05)      PAST MEDICAL & SURGICAL HISTORY:  HTN (hypertension)      Renal stones      Gout      Proteinuria      Hyperlipidemia      Stage 3 chronic kidney disease      BPH (benign prostatic hyperplasia)      Pleural effusion  right      History of shingles  10/2023      No significant past surgical history            Allergies:  No Known Allergies      Home Medications:   aspirin 81 mg oral delayed release capsule: orally once a day  atorvastatin 10 mg oral tablet: 1 tab(s) orally once a day  dexlansoprazole 60 mg oral delayed release capsule: 1 cap(s) orally once a day  losartan 100 mg oral tablet: 1 tab(s) orally once a day  metoprolol succinate 50 mg oral capsule, extended release: 1 cap(s) orally once a day  Nephplex Rx oral tablet: 1 tab(s) orally once a day  nitroglycerin 0.6 mg sublingual tablet: 1 tab(s) sublingually once a day as needed for  chest pain  Norvasc 5 mg oral tablet: 1 tab(s) orally once a day  sodium bicarbonate 650 mg oral tablet: 1 tab(s) orally once a day  Uloric 40 mg oral tablet: 1 tab(s) orally once a day      Hospital Medications:   MEDICATIONS  (STANDING):  atorvastatin 10 milliGRAM(s) Oral at bedtime  bisacodyl 5 milliGRAM(s) Oral at bedtime  ceFAZolin   IVPB 2000 milliGRAM(s) IV Intermittent every 8 hours  heparin   Injectable 5000 Unit(s) SubCutaneous every 8 hours  pantoprazole    Tablet 40 milliGRAM(s) Oral before breakfast  polyethylene glycol 3350 17 Gram(s) Oral daily  senna 2 Tablet(s) Oral at bedtime  sodium chloride 0.9%. 1000 milliLiter(s) (50 mL/Hr) IV Continuous <Continuous>      SOCIAL HISTORY:  Denies ETOh, Smoking    Family History:  FAMILY HISTORY:  No pertinent family history in first degree relatives          VITALS:  T(F): 97.9 (24 @ 13:01), Max: 98.1 (24 @ 01:08)  HR: 102 (24 @ 11:35)  BP: 148/65 (24 @ 11:35)  RR: 16 (24 @ 11:35)  SpO2: 98% (24 @ 11:35)  Wt(kg): --     @ 07:01  -   @ 07:00  --------------------------------------------------------  IN: 2575 mL / OUT: 1260 mL / NET: 1315 mL     @ 07:01  -   @ 14:11  --------------------------------------------------------  IN: 200 mL / OUT: 500 mL / NET: -300 mL        CAPILLARY BLOOD GLUCOSE      POCT Blood Glucose.: 116 mg/dL (2024 14:03)  POCT Blood Glucose.: 162 mg/dL (2024 12:00)  POCT Blood Glucose.: 103 mg/dL (2024 10:55)  POCT Blood Glucose.: 256 mg/dL (2024 09:45)  POCT Blood Glucose.: 143 mg/dL (2024 08:32)      Review of Systems:  CONSTITUTIONAL: No fever or chills.  RESPIRATORY: No shortness of breath, cough  CARDIOVASCULAR: No Chest pain, shortness of breath, palpitations  GASTROINTESTINAL: No abdominal pain, nausea, vomiting, diarrhea  GENITOURINARY: No urinary frequency, gross hematuria, dysuria  NEUROLOGICAL: No headache, weakness  SKIN: No rash or skin lesion  MUSCULOSKELETAL: No swelling  Psych: Denies suicidal or homicidal ideation    PHYSICAL EXAM:  GENERAL: Alert, awake, oriented x3   HEENT: LIZY, EOMI, neck supple, no JVP  CHEST/LUNG: Bilateral clear breath sounds  HEART: Regular rate and rhythm, no murmur, no gallops, no rub   ABDOMEN: Soft, nontender, non distended  : No flank or supra pubic tenderness.  EXTREMITIES: no pedal edema  Neurology: AAOx3, no focal neurological deficit  SKIN: No rash or skin lesion     LABS:      140  |  106  |  43<H>  ----------------------------<  119<H>  5.4<H>   |  20<L>  |  2.52<H>    Ca    11.1<H>      2024 06:08  Mg     1.9           Creatinine Trend: 2.52 <--, 2.53 <--, 2.71 <--                        9.1    17.01 )-----------( 327      ( 2024 06:08 )             28.0     Urine Studies:  Urinalysis Basic - ( 2024 10:24 )    Color: Yellow / Appearance: Clear / S.013 / pH:   Gluc:  / Ketone: Negative mg/dL  / Bili: Negative / Urobili: 0.2 mg/dL   Blood:  / Protein: Trace mg/dL / Nitrite: Negative   Leuk Esterase: Negative / RBC:  / WBC    Sq Epi:  / Non Sq Epi:  / Bacteria:       Sodium, Random Urine: 91 mmol/L ( @ 10:24)  Creatinine, Random Urine: 40 mg/dL ( @ 10:24)  Protein/Creatinine Ratio Calculation: 0.3 Ratio ( @ 10:24)  Osmolality, Random Urine: 391 mosm/kg ( @ 10:24)  Potassium, Random Urine: 32 mmol/L ( @ 10:24)           HPI:  73 year old M, Cantonese speaking, never a smoker, with PMHx of HTN, HLD, who is referred by Dr. Edward Jensen for an initial evaluation of pleural effusion. Patient reports right lower quadrant pain since Aug 2023, along with poor appetite, unintentionally weight loss around 20lbs, fatigue, progressively worse hoarseness and dyspnea. He had Shingles right arm in Oct 2023, and was subsequently diagnosed with vocal cord paralysis. No personal history of TB infection. Pt underwent R VATS, pleural biopsy decortication, on  with Dr. Kaiser. Intraop path revealed necrotizing granuloma. Pt being worked up for TB, ID consulted. 2 CT left in place to suction with airleak. POD 1 awaiting third sputum culture to be sent, and will start TB treatment. CTx2 still with airleak and will remain to suction.     Nephrology consulted for NARA. Son at bedside, states that was seen at PCP in early december had labs with sCr of 1.8, scr in HIE also recorded at 1.7. Consulted for further management.       PAST MEDICAL & SURGICAL HISTORY:  HTN (hypertension)      Renal stones      Gout      Proteinuria      Hyperlipidemia      Stage 3 chronic kidney disease      BPH (benign prostatic hyperplasia)      Pleural effusion  right      History of shingles  10/2023      No significant past surgical history            Allergies:  No Known Allergies      Home Medications:   aspirin 81 mg oral delayed release capsule: orally once a day  atorvastatin 10 mg oral tablet: 1 tab(s) orally once a day  dexlansoprazole 60 mg oral delayed release capsule: 1 cap(s) orally once a day  losartan 100 mg oral tablet: 1 tab(s) orally once a day  metoprolol succinate 50 mg oral capsule, extended release: 1 cap(s) orally once a day  Nephplex Rx oral tablet: 1 tab(s) orally once a day  nitroglycerin 0.6 mg sublingual tablet: 1 tab(s) sublingually once a day as needed for  chest pain  Norvasc 5 mg oral tablet: 1 tab(s) orally once a day  sodium bicarbonate 650 mg oral tablet: 1 tab(s) orally once a day  Uloric 40 mg oral tablet: 1 tab(s) orally once a day      Hospital Medications:   MEDICATIONS  (STANDING):  atorvastatin 10 milliGRAM(s) Oral at bedtime  bisacodyl 5 milliGRAM(s) Oral at bedtime  ceFAZolin   IVPB 2000 milliGRAM(s) IV Intermittent every 8 hours  heparin   Injectable 5000 Unit(s) SubCutaneous every 8 hours  pantoprazole    Tablet 40 milliGRAM(s) Oral before breakfast  polyethylene glycol 3350 17 Gram(s) Oral daily  senna 2 Tablet(s) Oral at bedtime  sodium chloride 0.9%. 1000 milliLiter(s) (50 mL/Hr) IV Continuous <Continuous>      SOCIAL HISTORY:  Denies ETOh, Smoking    Family History:  FAMILY HISTORY:  No pertinent family history in first degree relatives          VITALS:  T(F): 97.9 (24 @ 13:01), Max: 98.1 (24 @ 01:08)  HR: 102 (24 @ 11:35)  BP: 148/65 (24 @ 11:35)  RR: 16 (24 @ 11:35)  SpO2: 98% (24 @ 11:35)  Wt(kg): --     @ 07: @ 07:00  --------------------------------------------------------  IN: 2575 mL / OUT: 1260 mL / NET: 1315 mL     @ 07:01  -   @ 14:11  --------------------------------------------------------  IN: 200 mL / OUT: 500 mL / NET: -300 mL        CAPILLARY BLOOD GLUCOSE      POCT Blood Glucose.: 116 mg/dL (2024 14:03)  POCT Blood Glucose.: 162 mg/dL (2024 12:00)  POCT Blood Glucose.: 103 mg/dL (2024 10:55)  POCT Blood Glucose.: 256 mg/dL (2024 09:45)  POCT Blood Glucose.: 143 mg/dL (2024 08:32)      Review of Systems:  CONSTITUTIONAL: No fever or chills.  RESPIRATORY: No shortness of breath, cough  CARDIOVASCULAR: No Chest pain, shortness of breath, palpitations  GASTROINTESTINAL: No abdominal pain, nausea, vomiting, diarrhea  GENITOURINARY: No urinary frequency, gross hematuria, dysuria  NEUROLOGICAL: No headache, weakness  SKIN: No rash or skin lesion  MUSCULOSKELETAL: No swelling  Psych: Denies suicidal or homicidal ideation    PHYSICAL EXAM:  GENERAL: NAD, sitting in chir   HEENT: LIZY, EOMI, neck supple, no JVP  CHEST/LUNG: Bilateral clear breath sounds, pluervac in place   HEART: Regular rate and rhythm, no murmur, no gallops, no rub   ABDOMEN: Soft, nontender, non distended  : No flank or supra pubic tenderness.  EXTREMITIES: no pedal edema  Neurology: AAOx3, no focal neurological deficit  SKIN: No rash or skin lesion     LABS:      140  |  106  |  43<H>  ----------------------------<  119<H>  5.4<H>   |  20<L>  |  2.52<H>    Ca    11.1<H>      2024 06:08  Mg     1.9           Creatinine Trend: 2.52 <--, 2.53 <--, 2.71 <--                        9.1    17.01 )-----------( 327      ( 2024 06:08 )             28.0     Urine Studies:  Urinalysis Basic - ( 2024 10:24 )    Color: Yellow / Appearance: Clear / S.013 / pH:   Gluc:  / Ketone: Negative mg/dL  / Bili: Negative / Urobili: 0.2 mg/dL   Blood:  / Protein: Trace mg/dL / Nitrite: Negative   Leuk Esterase: Negative / RBC:  / WBC    Sq Epi:  / Non Sq Epi:  / Bacteria:       Sodium, Random Urine: 91 mmol/L ( @ 10:24)  Creatinine, Random Urine: 40 mg/dL ( @ 10:24)  Protein/Creatinine Ratio Calculation: 0.3 Ratio ( @ 10:24)  Osmolality, Random Urine: 391 mosm/kg ( @ 10:24)  Potassium, Random Urine: 32 mmol/L ( @ 10:24)

## 2024-01-06 NOTE — PROGRESS NOTE ADULT - SUBJECTIVE AND OBJECTIVE BOX
Patient is a 73y old  Male who presents with a chief complaint of elective surgery :  Right Video-Assisted Thoracoscopic Surgery, Robotic Assisted, Right pleural biopsy, Pleural effusion drainage, possible pleurodesis and PleurX placement (05 Jan 2024 18:17)    INTERVAL EVENTS: Event noted pt was confused overnight per CTS LORNE High     SUBJECTIVE:  Patient was seen and examined at bedside. Wife and son at bedside. Pt OOBTC, denies pain, cough, SOB,etc. Had BM and urination fine.     Review of systems: No fever, chills, dizziness, HA, Changes in vision, CP, dyspnea, nausea or vomiting, dysuria, changes in bowel movements, LE edema. Rest of 12 point Review of systems negative unless otherwise documented elsewhere in note.     Diet, DASH/TLC:   Sodium & Cholesterol Restricted (01-05-24 @ 23:16) [Active]      MEDICATIONS:  MEDICATIONS  (STANDING):  atorvastatin 10 milliGRAM(s) Oral at bedtime  bisacodyl 5 milliGRAM(s) Oral at bedtime  ceFAZolin   IVPB 2000 milliGRAM(s) IV Intermittent every 8 hours  heparin   Injectable 5000 Unit(s) SubCutaneous every 8 hours  pantoprazole    Tablet 40 milliGRAM(s) Oral before breakfast  polyethylene glycol 3350 17 Gram(s) Oral daily  senna 2 Tablet(s) Oral at bedtime  sodium chloride 0.9%. 1000 milliLiter(s) (50 mL/Hr) IV Continuous <Continuous>    MEDICATIONS  (PRN):  acetaminophen     Tablet .. 975 milliGRAM(s) Oral every 6 hours PRN Mild Pain (1 - 3)      Allergies    No Known Allergies    Intolerances        OBJECTIVE:  Vital Signs Last 24 Hrs  T(C): 36.7 (06 Jan 2024 08:47), Max: 36.7 (06 Jan 2024 01:08)  T(F): 98 (06 Jan 2024 08:47), Max: 98.1 (06 Jan 2024 01:08)  HR: 102 (06 Jan 2024 08:45) (80 - 110)  BP: 139/65 (06 Jan 2024 08:45) (100/66 - 144/66)  BP(mean): 88 (06 Jan 2024 08:45) (69 - 98)  RR: 16 (06 Jan 2024 08:45) (16 - 20)  SpO2: 100% (06 Jan 2024 08:45) (94% - 100%)    Parameters below as of 06 Jan 2024 08:45  Patient On (Oxygen Delivery Method): room air      I&O's Summary    05 Jan 2024 07:01  -  06 Jan 2024 07:00  --------------------------------------------------------  IN: 2575 mL / OUT: 1260 mL / NET: 1315 mL    06 Jan 2024 07:01  -  06 Jan 2024 10:34  --------------------------------------------------------  IN: 200 mL / OUT: 290 mL / NET: -90 mL        PHYSICAL EXAM:  Gen: Reclining in bed at time of exam, appears stated age  HEENT: NCAT, MMM, clear OP  Neck: supple, trachea at midline  CV: RRR, +S1/S2  Pulm: adequate respiratory effort, no increase in work of breathing, R CT x2 in place   Abd: soft, NTND  Skin: warm and dry,   Ext: WWP, no LE edema  Neuro: AOx3, no gross focal neurological deficits  Psych: affect and behavior appropriate, pleasant at time of interview  :     LABS:                        9.1    17.01 )-----------( 327      ( 06 Jan 2024 06:08 )             28.0     01-06    140  |  106  |  43<H>  ----------------------------<  119<H>  5.4<H>   |  20<L>  |  2.52<H>    Ca    11.1<H>      06 Jan 2024 06:08  Mg     1.9     01-06          CAPILLARY BLOOD GLUCOSE      POCT Blood Glucose.: 256 mg/dL (06 Jan 2024 09:45)  POCT Blood Glucose.: 143 mg/dL (06 Jan 2024 08:32)    Urinalysis Basic - ( 06 Jan 2024 06:08 )    Color: x / Appearance: x / SG: x / pH: x  Gluc: 119 mg/dL / Ketone: x  / Bili: x / Urobili: x   Blood: x / Protein: x / Nitrite: x   Leuk Esterase: x / RBC: x / WBC x   Sq Epi: x / Non Sq Epi: x / Bacteria: x        MICRODATA:    Culture - Fungal, Tissue (collected 05 Jan 2024 08:44)  Source: Pleura #2 Right Pleura  Preliminary Report (06 Jan 2024 08:46):    Testing in progress    Culture - Tissue with Gram Stain (collected 05 Jan 2024 08:44)  Source: Pleura #2 Right Pleura  Gram Stain (05 Jan 2024 16:35):    No organisms seen    Rare WBC's  Preliminary Report (06 Jan 2024 08:45):    No growth to date    Culture - Fungal, Body Fluid (collected 05 Jan 2024 08:20)  Source: .Body Fluid right pleural fluid  Preliminary Report (06 Jan 2024 07:31):    Testing in progress    Culture - Body Fluid with Gram Stain (collected 05 Jan 2024 08:20)  Source: .Body Fluid right pleural fluid  Gram Stain (05 Jan 2024 16:36):    No organisms seen    Rare WBC's  Preliminary Report (06 Jan 2024 08:47):    No growth to date        RADIOLOGY/OTHER STUDIES:    PCP  Pharmacy:   Emergency contact:

## 2024-01-06 NOTE — PROGRESS NOTE ADULT - SUBJECTIVE AND OBJECTIVE BOX
Patient discussed on morning rounds with Dr. Arthur    OPERATION & DATE:  R VATS, RA, pleural biopsy and decortication    SUBJECTIVE ASSESSMENT: resting comfortably in chair. Per family, pt had multiple episodes of sundowning last night which required haldol but now a/ox3 and behaving normally. Pt denies CP or SOB at this time.     VITAL SIGNS:  Vital Signs Last 24 Hrs  T(C): 36.7 (2024 08:47), Max: 36.7 (2024 01:08)  T(F): 98 (2024 08:47), Max: 98.1 (2024 01:08)  HR: 102 (2024 11:35) (85 - 110)  BP: 148/65 (2024 11:35) (100/66 - 148/65)  BP(mean): 94 (2024 11:35) (69 - 98)  RR: 16 (2024 11:35) (16 - 20)  SpO2: 98% (2024 11:35) (97% - 100%)    Parameters below as of 2024 11:35  Patient On (Oxygen Delivery Method): room air      I&O's Detail    2024 07:01  -  2024 07:00  --------------------------------------------------------  IN:    IV PiggyBack: 300 mL    Lactated Ringers: 775 mL    Lactated Ringers Bolus: 1000 mL    sodium chloride 0.9%: 500 mL  Total IN: 2575 mL    OUT:    Chest Tube (mL): 350 mL    Chest Tube (mL): 85 mL    Voided (mL): 825 mL  Total OUT: 1260 mL    Total NET: 1315 mL      2024 07:01  -  2024 11:54  --------------------------------------------------------  IN:    sodium chloride 0.9%: 200 mL  Total IN: 200 mL    OUT:    Chest Tube (mL): 50 mL    Chest Tube (mL): 0 mL    Voided (mL): 450 mL  Total OUT: 500 mL    Total NET: -300 mL        CHEST TUBE:  2 R sided CT to suction  DEON DRAIN:  none  EPICARDIAL WIRES: none  STITCHES: none  STAPLES: none  TRUJILLO: none  CENTRAL LINE: none  MIDLINE/PICC: none  WOUND VAC: none    PHYSICAL EXAM:  General: resting comfortably in bed in NAD  Neurological: AOx3. Motor skills grossly intact  Cardiovascular: Normal S1/S2. Regular rate/rhythm. No murmurs  Respiratory: Lungs CTA bilaterally. No wheezing or rales  Gastrointestinal: +BS in all 4 quadrants. Non-distended. Soft. Non-tender  Extremities: Strength 5/5 b/l upper/lower extremities. Sensation grossly intact upper/lower extremities. No edema. No calf tenderness.  Vascular: Radial 2+bilaterally, DP 2+ b/l  Incision Sites: VATS incisions covered by dressing      LABS:                        9.1    17.01 )-----------( 327      ( 2024 06:08 )             28.0       01-06    140  |  106  |  43<H>  ----------------------------<  119<H>  5.4<H>   |  20<L>  |  2.52<H>    Ca    11.1<H>      2024 06:08  Mg     1.9     01-06      Urinalysis Basic - ( 2024 10:24 )    Color: Yellow / Appearance: Clear / S.013 / pH: x  Gluc: x / Ketone: Negative mg/dL  / Bili: Negative / Urobili: 0.2 mg/dL   Blood: x / Protein: Trace mg/dL / Nitrite: Negative   Leuk Esterase: Negative / RBC: x / WBC x   Sq Epi: x / Non Sq Epi: x / Bacteria: x      MEDICATIONS  (STANDING):  atorvastatin 10 milliGRAM(s) Oral at bedtime  bisacodyl 5 milliGRAM(s) Oral at bedtime  ceFAZolin   IVPB 2000 milliGRAM(s) IV Intermittent every 8 hours  heparin   Injectable 5000 Unit(s) SubCutaneous every 8 hours  pantoprazole    Tablet 40 milliGRAM(s) Oral before breakfast  polyethylene glycol 3350 17 Gram(s) Oral daily  senna 2 Tablet(s) Oral at bedtime  sodium chloride 0.9%. 1000 milliLiter(s) (50 mL/Hr) IV Continuous <Continuous>    MEDICATIONS  (PRN):  acetaminophen     Tablet .. 975 milliGRAM(s) Oral every 6 hours PRN Mild Pain (1 - 3)    RADIOLOGY & ADDITIONAL TESTS:

## 2024-01-06 NOTE — PROGRESS NOTE ADULT - ASSESSMENT
73 year old M, Cantonese speaking, never a smoker, with PMHx of HTN, HLD, who is referred by Dr. Edward Jensen for an initial evaluation of pleural effusion. Patient reports right lower quadrant pain since Aug 2023, along with poor appetite, unintentionally weight loss around 20lbs, fatigue, progressively worse hoarseness and dyspnea. He had Shingles right arm in Oct 2023, and was subsequently diagnosed with vocal cord paralysis. No personal history of TB infection. Pt underwent R VATS, pleural biopsy decortication, on 1/5 with Dr. Kaiser. Intraop path revealed necrotizing granuloma. Pt being worked up for TB, ID consulted. 2 CT left in place to suction with airleak. POD 1 awaiting third sputum culture to be sent, and will start TB treatment. CTx2 still with airleak and will remain to suction.     Plan:    Neurovascular:   -Pain well controlled with current regimen. PRN's: tylenol, oxy    Cardiovascular:   -Hemodynamically stable.   -Monitor: BP, HR, tele  -HLD    -c/w lipitor    Respiratory:   -Encourage continued use of IS 10x/hr and frequent ambulation  -CXR: post op changes  -CT management    -2 CT to remain suction     GI:  -GI PPX: protonix  -PO Diet  -Bowel Regimen: dulcolax, senna, miralax    Renal / :  -Continue to monitor renal function: BUN/Cr 43/2.52  -Renal following, f/u urine lytes  -Monitor I/O's daily     Endocrine:    -No hx of DM or thyroid dx      Hematologic:  -CBC: H/H- 9/28  -Coagulation Panel.    ID:  -Temperature: afebrile  -CBC: WBC- 17  -necrotizing granuloma found in OR    -Will start empiric TB tx once last sputum culture is sent    -AFB x 3   -Continue to observe for SIRS/Sepsis Syndrome.    Prophylaxis:  -DVT prophylaxis with 5000 SubQ Heparin q8h.  -Continue with SCD's b/l while patient is at rest     Disposition:  -Discharge home once patient is medically ready

## 2024-01-06 NOTE — CONSULT NOTE ADULT - ASSESSMENT
73 M s/p Pt underwent R VATS, pleural biopsy decortication, on 1/5 with Dr. Kaiser. Intraop path revealed necrotizing granuloma. Pt being worked up for TB, ID consulted. 2 CT left in place to suction with airleak. POD 1 awaiting third sputum culture to be sent, and will start TB treatment. CTx2 still with airleak and will remain to suction now h/c c/b non oliguric NARA       Nonoliguric NARA on underlying CKD 3  (baseline sCr 1.8)   sCr now at 2.31 from peak of 2.7   UA bland with low grade proteinuria of 0.3g   Likley etiology prerenal azotemia given low intra op BP and improving with IVF         Plan:  Continue with IVF at 40cc/hr   Maintain even to slight  pos FB   Daily BMP   Follow up formal renal sono  Hold ACE/ARBS for now given NARA   Encourage PO intake   Maintain MAP >65 for renal perfusion   Recommend d/c PPI or change to famotidine    73 M s/p Pt underwent R VATS, pleural biopsy decortication, on 1/5 with Dr. Kaiser. Intraop path revealed necrotizing granuloma. Pt being worked up for TB, ID consulted. 2 CT left in place to suction with airleak. POD 1 awaiting third sputum culture to be sent, and will start TB treatment. CTx2 still with airleak and will remain to suction now h/c c/b non oliguric NARA       Nonoliguric NARA on underlying CKD 3  (baseline sCr 1.8)   sCr now at 2.31 from peak of 2.7   UA bland with low grade proteinuria of 0.3g   Likely etiology prerenal azotemia given low intra op BP and improving with IVF         Plan:  Continue with IVF at 40cc/hr   Maintain even to slight  pos FB   Daily BMP   Follow up formal renal sono  Hold ACE/ARBS for now given NARA   Encourage PO intake   Maintain MAP >65 for renal perfusion   Recommend d/c PPI or change to famotidine

## 2024-01-07 LAB
24R-OH-CALCIDIOL SERPL-MCNC: 33.2 NG/ML — SIGNIFICANT CHANGE UP (ref 30–80)
24R-OH-CALCIDIOL SERPL-MCNC: 33.2 NG/ML — SIGNIFICANT CHANGE UP (ref 30–80)
ALBUMIN SERPL ELPH-MCNC: 3.1 G/DL — LOW (ref 3.3–5)
ALBUMIN SERPL ELPH-MCNC: 3.1 G/DL — LOW (ref 3.3–5)
ALP SERPL-CCNC: 69 U/L — SIGNIFICANT CHANGE UP (ref 40–120)
ALP SERPL-CCNC: 69 U/L — SIGNIFICANT CHANGE UP (ref 40–120)
ALT FLD-CCNC: <5 U/L — LOW (ref 10–45)
ALT FLD-CCNC: <5 U/L — LOW (ref 10–45)
ANION GAP SERPL CALC-SCNC: 15 MMOL/L — SIGNIFICANT CHANGE UP (ref 5–17)
ANION GAP SERPL CALC-SCNC: 15 MMOL/L — SIGNIFICANT CHANGE UP (ref 5–17)
APPEARANCE UR: CLEAR — SIGNIFICANT CHANGE UP
APPEARANCE UR: CLEAR — SIGNIFICANT CHANGE UP
AST SERPL-CCNC: 17 U/L — SIGNIFICANT CHANGE UP (ref 10–40)
AST SERPL-CCNC: 17 U/L — SIGNIFICANT CHANGE UP (ref 10–40)
BACTERIA # UR AUTO: NEGATIVE /HPF — SIGNIFICANT CHANGE UP
BACTERIA # UR AUTO: NEGATIVE /HPF — SIGNIFICANT CHANGE UP
BILIRUB SERPL-MCNC: 0.6 MG/DL — SIGNIFICANT CHANGE UP (ref 0.2–1.2)
BILIRUB SERPL-MCNC: 0.6 MG/DL — SIGNIFICANT CHANGE UP (ref 0.2–1.2)
BILIRUB UR-MCNC: NEGATIVE — SIGNIFICANT CHANGE UP
BILIRUB UR-MCNC: NEGATIVE — SIGNIFICANT CHANGE UP
BUN SERPL-MCNC: 37 MG/DL — HIGH (ref 7–23)
BUN SERPL-MCNC: 37 MG/DL — HIGH (ref 7–23)
CALCIUM SERPL-MCNC: 10.5 MG/DL — SIGNIFICANT CHANGE UP (ref 8.4–10.5)
CALCIUM SERPL-MCNC: 10.5 MG/DL — SIGNIFICANT CHANGE UP (ref 8.4–10.5)
CAST: 0 /LPF — SIGNIFICANT CHANGE UP (ref 0–4)
CAST: 0 /LPF — SIGNIFICANT CHANGE UP (ref 0–4)
CHLORIDE SERPL-SCNC: 108 MMOL/L — SIGNIFICANT CHANGE UP (ref 96–108)
CHLORIDE SERPL-SCNC: 108 MMOL/L — SIGNIFICANT CHANGE UP (ref 96–108)
CO2 SERPL-SCNC: 18 MMOL/L — LOW (ref 22–31)
CO2 SERPL-SCNC: 18 MMOL/L — LOW (ref 22–31)
COLOR SPEC: SIGNIFICANT CHANGE UP
COLOR SPEC: SIGNIFICANT CHANGE UP
CREAT SERPL-MCNC: 2.13 MG/DL — HIGH (ref 0.5–1.3)
CREAT SERPL-MCNC: 2.13 MG/DL — HIGH (ref 0.5–1.3)
DIFF PNL FLD: NEGATIVE — SIGNIFICANT CHANGE UP
DIFF PNL FLD: NEGATIVE — SIGNIFICANT CHANGE UP
EGFR: 32 ML/MIN/1.73M2 — LOW
EGFR: 32 ML/MIN/1.73M2 — LOW
GLUCOSE SERPL-MCNC: 152 MG/DL — HIGH (ref 70–99)
GLUCOSE SERPL-MCNC: 152 MG/DL — HIGH (ref 70–99)
GLUCOSE UR QL: NEGATIVE MG/DL — SIGNIFICANT CHANGE UP
GLUCOSE UR QL: NEGATIVE MG/DL — SIGNIFICANT CHANGE UP
HCT VFR BLD CALC: 28.4 % — LOW (ref 39–50)
HCT VFR BLD CALC: 28.4 % — LOW (ref 39–50)
HGB BLD-MCNC: 9.3 G/DL — LOW (ref 13–17)
HGB BLD-MCNC: 9.3 G/DL — LOW (ref 13–17)
KETONES UR-MCNC: NEGATIVE MG/DL — SIGNIFICANT CHANGE UP
KETONES UR-MCNC: NEGATIVE MG/DL — SIGNIFICANT CHANGE UP
LEUKOCYTE ESTERASE UR-ACNC: ABNORMAL
LEUKOCYTE ESTERASE UR-ACNC: ABNORMAL
MAGNESIUM SERPL-MCNC: 1.8 MG/DL — SIGNIFICANT CHANGE UP (ref 1.6–2.6)
MAGNESIUM SERPL-MCNC: 1.8 MG/DL — SIGNIFICANT CHANGE UP (ref 1.6–2.6)
MCHC RBC-ENTMCNC: 29.4 PG — SIGNIFICANT CHANGE UP (ref 27–34)
MCHC RBC-ENTMCNC: 29.4 PG — SIGNIFICANT CHANGE UP (ref 27–34)
MCHC RBC-ENTMCNC: 32.7 GM/DL — SIGNIFICANT CHANGE UP (ref 32–36)
MCHC RBC-ENTMCNC: 32.7 GM/DL — SIGNIFICANT CHANGE UP (ref 32–36)
MCV RBC AUTO: 89.9 FL — SIGNIFICANT CHANGE UP (ref 80–100)
MCV RBC AUTO: 89.9 FL — SIGNIFICANT CHANGE UP (ref 80–100)
NIGHT BLUE STAIN TISS: SIGNIFICANT CHANGE UP
NIGHT BLUE STAIN TISS: SIGNIFICANT CHANGE UP
NITRITE UR-MCNC: POSITIVE
NITRITE UR-MCNC: POSITIVE
NRBC # BLD: 0 /100 WBCS — SIGNIFICANT CHANGE UP (ref 0–0)
NRBC # BLD: 0 /100 WBCS — SIGNIFICANT CHANGE UP (ref 0–0)
PH UR: 7 — SIGNIFICANT CHANGE UP (ref 5–8)
PH UR: 7 — SIGNIFICANT CHANGE UP (ref 5–8)
PLATELET # BLD AUTO: 308 K/UL — SIGNIFICANT CHANGE UP (ref 150–400)
PLATELET # BLD AUTO: 308 K/UL — SIGNIFICANT CHANGE UP (ref 150–400)
POTASSIUM SERPL-MCNC: 4 MMOL/L — SIGNIFICANT CHANGE UP (ref 3.5–5.3)
POTASSIUM SERPL-MCNC: 4 MMOL/L — SIGNIFICANT CHANGE UP (ref 3.5–5.3)
POTASSIUM SERPL-SCNC: 4 MMOL/L — SIGNIFICANT CHANGE UP (ref 3.5–5.3)
POTASSIUM SERPL-SCNC: 4 MMOL/L — SIGNIFICANT CHANGE UP (ref 3.5–5.3)
PROT SERPL-MCNC: 7.5 G/DL — SIGNIFICANT CHANGE UP (ref 6–8.3)
PROT SERPL-MCNC: 7.5 G/DL — SIGNIFICANT CHANGE UP (ref 6–8.3)
PROT UR-MCNC: 30 MG/DL
PROT UR-MCNC: 30 MG/DL
RBC # BLD: 3.16 M/UL — LOW (ref 4.2–5.8)
RBC # BLD: 3.16 M/UL — LOW (ref 4.2–5.8)
RBC # FLD: 17.1 % — HIGH (ref 10.3–14.5)
RBC # FLD: 17.1 % — HIGH (ref 10.3–14.5)
RBC CASTS # UR COMP ASSIST: 0 /HPF — SIGNIFICANT CHANGE UP (ref 0–4)
RBC CASTS # UR COMP ASSIST: 0 /HPF — SIGNIFICANT CHANGE UP (ref 0–4)
SODIUM SERPL-SCNC: 141 MMOL/L — SIGNIFICANT CHANGE UP (ref 135–145)
SODIUM SERPL-SCNC: 141 MMOL/L — SIGNIFICANT CHANGE UP (ref 135–145)
SP GR SPEC: 1.01 — SIGNIFICANT CHANGE UP (ref 1–1.03)
SP GR SPEC: 1.01 — SIGNIFICANT CHANGE UP (ref 1–1.03)
SPECIMEN SOURCE: SIGNIFICANT CHANGE UP
SPECIMEN SOURCE: SIGNIFICANT CHANGE UP
SQUAMOUS # UR AUTO: 0 /HPF — SIGNIFICANT CHANGE UP (ref 0–5)
SQUAMOUS # UR AUTO: 0 /HPF — SIGNIFICANT CHANGE UP (ref 0–5)
UROBILINOGEN FLD QL: 1 MG/DL — SIGNIFICANT CHANGE UP (ref 0.2–1)
UROBILINOGEN FLD QL: 1 MG/DL — SIGNIFICANT CHANGE UP (ref 0.2–1)
WBC # BLD: 13.78 K/UL — HIGH (ref 3.8–10.5)
WBC # BLD: 13.78 K/UL — HIGH (ref 3.8–10.5)
WBC # FLD AUTO: 13.78 K/UL — HIGH (ref 3.8–10.5)
WBC # FLD AUTO: 13.78 K/UL — HIGH (ref 3.8–10.5)
WBC UR QL: 2 /HPF — SIGNIFICANT CHANGE UP (ref 0–5)
WBC UR QL: 2 /HPF — SIGNIFICANT CHANGE UP (ref 0–5)

## 2024-01-07 PROCEDURE — 76770 US EXAM ABDO BACK WALL COMP: CPT | Mod: 26

## 2024-01-07 PROCEDURE — 99233 SBSQ HOSP IP/OBS HIGH 50: CPT

## 2024-01-07 PROCEDURE — 71045 X-RAY EXAM CHEST 1 VIEW: CPT | Mod: 26

## 2024-01-07 PROCEDURE — 99232 SBSQ HOSP IP/OBS MODERATE 35: CPT

## 2024-01-07 RX ORDER — OXYCODONE HYDROCHLORIDE 5 MG/1
5 TABLET ORAL EVERY 6 HOURS
Refills: 0 | Status: DISCONTINUED | OUTPATIENT
Start: 2024-01-07 | End: 2024-01-13

## 2024-01-07 RX ADMIN — Medication 975 MILLIGRAM(S): at 04:25

## 2024-01-07 RX ADMIN — Medication 100 MILLIGRAM(S): at 00:05

## 2024-01-07 RX ADMIN — Medication 975 MILLIGRAM(S): at 19:10

## 2024-01-07 RX ADMIN — OXYCODONE HYDROCHLORIDE 5 MILLIGRAM(S): 5 TABLET ORAL at 16:17

## 2024-01-07 RX ADMIN — FAMOTIDINE 10 MILLIGRAM(S): 10 INJECTION INTRAVENOUS at 11:02

## 2024-01-07 RX ADMIN — Medication 975 MILLIGRAM(S): at 04:40

## 2024-01-07 RX ADMIN — ATORVASTATIN CALCIUM 10 MILLIGRAM(S): 80 TABLET, FILM COATED ORAL at 22:58

## 2024-01-07 RX ADMIN — Medication 975 MILLIGRAM(S): at 13:00

## 2024-01-07 RX ADMIN — POLYETHYLENE GLYCOL 3350 17 GRAM(S): 17 POWDER, FOR SOLUTION ORAL at 11:03

## 2024-01-07 RX ADMIN — HEPARIN SODIUM 5000 UNIT(S): 5000 INJECTION INTRAVENOUS; SUBCUTANEOUS at 23:00

## 2024-01-07 RX ADMIN — HEPARIN SODIUM 5000 UNIT(S): 5000 INJECTION INTRAVENOUS; SUBCUTANEOUS at 06:47

## 2024-01-07 RX ADMIN — Medication 25 MILLIGRAM(S): at 06:47

## 2024-01-07 RX ADMIN — Medication 10 MILLIGRAM(S): at 11:00

## 2024-01-07 RX ADMIN — PYRAZINAMIDE 1500 MILLIGRAM(S): 500 TABLET ORAL at 11:02

## 2024-01-07 RX ADMIN — SENNA PLUS 2 TABLET(S): 8.6 TABLET ORAL at 23:00

## 2024-01-07 RX ADMIN — Medication 300 MILLIGRAM(S): at 11:02

## 2024-01-07 RX ADMIN — HEPARIN SODIUM 5000 UNIT(S): 5000 INJECTION INTRAVENOUS; SUBCUTANEOUS at 13:45

## 2024-01-07 RX ADMIN — Medication 975 MILLIGRAM(S): at 12:06

## 2024-01-07 RX ADMIN — ETHAMBUTOL HYDROCHLORIDE 1200 MILLIGRAM(S): 400 TABLET, FILM COATED ORAL at 11:02

## 2024-01-07 RX ADMIN — OXYCODONE HYDROCHLORIDE 5 MILLIGRAM(S): 5 TABLET ORAL at 17:30

## 2024-01-07 RX ADMIN — Medication 975 MILLIGRAM(S): at 19:55

## 2024-01-07 RX ADMIN — Medication 5 MILLIGRAM(S): at 22:58

## 2024-01-07 NOTE — PROGRESS NOTE ADULT - SUBJECTIVE AND OBJECTIVE BOX
Patient discussed on morning rounds with Dr. Arthur    OPERATION & DATE: 1/5 R VATS, RA, pleural biopsy and decortication    SUBJECTIVE ASSESSMENT: resting comfortably in bed in NAD, no acute complaints at this time    VITAL SIGNS:  Vital Signs Last 24 Hrs  T(C): 36.6 (07 Jan 2024 08:52), Max: 36.7 (06 Jan 2024 16:41)  T(F): 97.8 (07 Jan 2024 08:52), Max: 98.1 (06 Jan 2024 21:32)  HR: 96 (07 Jan 2024 09:00) (96 - 112)  BP: 152/69 (07 Jan 2024 09:00) (148/65 - 161/83)  BP(mean): 99 (07 Jan 2024 09:00) (94 - 115)  RR: 16 (07 Jan 2024 09:00) (16 - 18)  SpO2: 99% (07 Jan 2024 09:00) (97% - 99%)    Parameters below as of 07 Jan 2024 09:00  Patient On (Oxygen Delivery Method): room air      I&O's Detail    06 Jan 2024 07:01  -  07 Jan 2024 07:00  --------------------------------------------------------  IN:    IV PiggyBack: 50 mL    Oral Fluid: 240 mL    sodium chloride 0.9%: 1125 mL    Sodium Chloride 0.9% Bolus: 1000 mL  Total IN: 2415 mL    OUT:    Chest Tube (mL): 35 mL    Chest Tube (mL): 150 mL    Voided (mL): 1675 mL  Total OUT: 1860 mL    Total NET: 555 mL      07 Jan 2024 07:01  -  07 Jan 2024 10:44  --------------------------------------------------------  IN:    Oral Fluid: 150 mL    sodium chloride 0.9%: 100 mL  Total IN: 250 mL    OUT:    Chest Tube (mL): 10 mL    Chest Tube (mL): 10 mL  Total OUT: 20 mL    Total NET: 230 mL        CHEST TUBE:  2 right CT to suction  DEON DRAIN:  none  EPICARDIAL WIRES: none  STITCHES: none  STAPLES: none  TRUJILLO:  none  CENTRAL LINE: none  MIDLINE/PICC: none  WOUND VAC: none    PHYSICAL EXAM:  General: resting comfortably in bed in NAD  Neurological: AOx3. Motor skills grossly intact  Cardiovascular: Normal S1/S2. Regular rate/rhythm. No murmurs  Respiratory: Lungs CTA bilaterally. No wheezing or rales  Gastrointestinal: +BS in all 4 quadrants. Non-distended. Soft. Non-tender  Extremities: Strength 5/5 b/l upper/lower extremities. Sensation grossly intact upper/lower extremities. No edema. No calf tenderness.  Vascular: Radial 2+bilaterally, DP 2+ b/l  Incision Sites: R VATS covered by dressing      LABS:                        9.3    13.78 )-----------( 308      ( 07 Jan 2024 05:30 )             28.4       01-07    141  |  108  |  37<H>  ----------------------------<  152<H>  4.0   |  18<L>  |  2.13<H>    Ca    10.5      07 Jan 2024 05:30  Mg     1.8     01-07    TPro  7.5  /  Alb  3.1<L>  /  TBili  0.6  /  DBili  x   /  AST  17  /  ALT  <5<L>  /  AlkPhos  69  01-07    Urinalysis Basic - ( 07 Jan 2024 05:30 )    Color: x / Appearance: x / SG: x / pH: x  Gluc: 152 mg/dL / Ketone: x  / Bili: x / Urobili: x   Blood: x / Protein: x / Nitrite: x   Leuk Esterase: x / RBC: x / WBC x   Sq Epi: x / Non Sq Epi: x / Bacteria: x      MEDICATIONS  (STANDING):  atorvastatin 10 milliGRAM(s) Oral at bedtime  bisacodyl 5 milliGRAM(s) Oral at bedtime  ethambutol 1200 milliGRAM(s) Oral daily  famotidine    Tablet 10 milliGRAM(s) Oral daily  heparin   Injectable 5000 Unit(s) SubCutaneous every 8 hours  isoniazid 300 milliGRAM(s) Oral daily  metoprolol succinate ER 25 milliGRAM(s) Oral daily  polyethylene glycol 3350 17 Gram(s) Oral daily  pyrazinamide 1500 milliGRAM(s) Oral daily  rifAMPin 600 milliGRAM(s) Oral daily  senna 2 Tablet(s) Oral at bedtime  sodium chloride 0.9%. 1000 milliLiter(s) (50 mL/Hr) IV Continuous <Continuous>    MEDICATIONS  (PRN):  acetaminophen     Tablet .. 975 milliGRAM(s) Oral every 6 hours PRN Mild Pain (1 - 3)    RADIOLOGY & ADDITIONAL TESTS:

## 2024-01-07 NOTE — PROGRESS NOTE ADULT - SUBJECTIVE AND OBJECTIVE BOX
Patient is a 73y old  Male who presents with a chief complaint of elective surgery :  Right Video-Assisted Thoracoscopic Surgery, Robotic Assisted, Right pleural biopsy, Pleural effusion drainage, possible pleurodesis and PleurX placement (07 Jan 2024 10:44)    INTERVAL EVENTS: NAEON    SUBJECTIVE:  Patient was seen and examined at bedside. No complaints. Had BM this am. Denies pain or SOB     Review of systems: No fever, chills, dizziness, HA, Changes in vision, CP, dyspnea, nausea or vomiting, dysuria, changes in bowel movements, LE edema. Rest of 12 point Review of systems negative unless otherwise documented elsewhere in note.     Diet, DASH/TLC:   Sodium & Cholesterol Restricted (01-05-24 @ 23:16) [Active]      MEDICATIONS:  MEDICATIONS  (STANDING):  atorvastatin 10 milliGRAM(s) Oral at bedtime  bisacodyl 5 milliGRAM(s) Oral at bedtime  bisacodyl Suppository 10 milliGRAM(s) Rectal once  ethambutol 1200 milliGRAM(s) Oral daily  famotidine    Tablet 10 milliGRAM(s) Oral daily  heparin   Injectable 5000 Unit(s) SubCutaneous every 8 hours  isoniazid 300 milliGRAM(s) Oral daily  metoprolol succinate ER 25 milliGRAM(s) Oral daily  polyethylene glycol 3350 17 Gram(s) Oral daily  pyrazinamide 1500 milliGRAM(s) Oral daily  rifAMPin 600 milliGRAM(s) Oral daily  senna 2 Tablet(s) Oral at bedtime  sodium chloride 0.9%. 1000 milliLiter(s) (50 mL/Hr) IV Continuous <Continuous>    MEDICATIONS  (PRN):  acetaminophen     Tablet .. 975 milliGRAM(s) Oral every 6 hours PRN Mild Pain (1 - 3)      Allergies    No Known Allergies    Intolerances        OBJECTIVE:  Vital Signs Last 24 Hrs  T(C): 36.6 (07 Jan 2024 08:52), Max: 36.7 (06 Jan 2024 16:41)  T(F): 97.8 (07 Jan 2024 08:52), Max: 98.1 (06 Jan 2024 21:32)  HR: 96 (07 Jan 2024 09:00) (96 - 112)  BP: 152/69 (07 Jan 2024 09:00) (148/65 - 161/83)  BP(mean): 99 (07 Jan 2024 09:00) (94 - 115)  RR: 16 (07 Jan 2024 09:00) (16 - 18)  SpO2: 99% (07 Jan 2024 09:00) (97% - 99%)    Parameters below as of 07 Jan 2024 09:00  Patient On (Oxygen Delivery Method): room air      I&O's Summary    06 Jan 2024 07:01  -  07 Jan 2024 07:00  --------------------------------------------------------  IN: 2415 mL / OUT: 1860 mL / NET: 555 mL    07 Jan 2024 07:01  -  07 Jan 2024 11:24  --------------------------------------------------------  IN: 250 mL / OUT: 20 mL / NET: 230 mL        PHYSICAL EXAM:  Gen: Reclining in bed at time of exam, appears stated age  HEENT: NCAT, MMM, clear OP  Neck: supple, trachea at midline  CV: RRR, +S1/S2  Pulm: adequate respiratory effort, no increase in work of breathing, CT x2 in place   Abd: soft, NTND  Skin: warm and dry,   Ext: WWP, no LE edema  Neuro: AOx3, no gross focal neurological deficits  Psych: affect and behavior appropriate, pleasant at time of interview  :     LABS:                        9.3    13.78 )-----------( 308      ( 07 Jan 2024 05:30 )             28.4     01-07    141  |  108  |  37<H>  ----------------------------<  152<H>  4.0   |  18<L>  |  2.13<H>    Ca    10.5      07 Jan 2024 05:30  Mg     1.8     01-07    TPro  7.5  /  Alb  3.1<L>  /  TBili  0.6  /  DBili  x   /  AST  17  /  ALT  <5<L>  /  AlkPhos  69  01-07    LIVER FUNCTIONS - ( 07 Jan 2024 05:30 )  Alb: 3.1 g/dL / Pro: 7.5 g/dL / ALK PHOS: 69 U/L / ALT: <5 U/L / AST: 17 U/L / GGT: x             CAPILLARY BLOOD GLUCOSE      POCT Blood Glucose.: 132 mg/dL (06 Jan 2024 15:54)  POCT Blood Glucose.: 116 mg/dL (06 Jan 2024 14:03)  POCT Blood Glucose.: 162 mg/dL (06 Jan 2024 12:00)    Urinalysis Basic - ( 07 Jan 2024 05:30 )    Color: x / Appearance: x / SG: x / pH: x  Gluc: 152 mg/dL / Ketone: x  / Bili: x / Urobili: x   Blood: x / Protein: x / Nitrite: x   Leuk Esterase: x / RBC: x / WBC x   Sq Epi: x / Non Sq Epi: x / Bacteria: x        MICRODATA:    Culture - Fungal, Tissue (collected 05 Jan 2024 08:44)  Source: Pleura #2 Right Pleura  Preliminary Report (06 Jan 2024 08:46):    Testing in progress    Culture - Acid Fast - Tissue w/Smear (collected 05 Jan 2024 08:44)  Source: Pleura #2 Right Pleura    Culture - Tissue with Gram Stain (collected 05 Jan 2024 08:44)  Source: Pleura #2 Right Pleura  Gram Stain (05 Jan 2024 16:35):    No organisms seen    Rare WBC's  Preliminary Report (06 Jan 2024 08:45):    No growth to date    Culture - Fungal, Body Fluid (collected 05 Jan 2024 08:20)  Source: .Body Fluid right pleural fluid  Preliminary Report (06 Jan 2024 07:31):    Testing in progress    Culture - Acid Fast - Body Fluid w/Smear (collected 05 Jan 2024 08:20)  Source: .Body Fluid right pleural fluid    Culture - Body Fluid with Gram Stain (collected 05 Jan 2024 08:20)  Source: .Body Fluid right pleural fluid  Gram Stain (05 Jan 2024 16:36):    No organisms seen    Rare WBC's  Preliminary Report (06 Jan 2024 08:47):    No growth to date        RADIOLOGY/OTHER STUDIES:

## 2024-01-07 NOTE — PROGRESS NOTE ADULT - SUBJECTIVE AND OBJECTIVE BOX
INTERVAL HPI/OVERNIGHT EVENTS:    Patient was seen and examined at bedside.  Tolerating RIPE therapy    CONSTITUTIONAL:  Negative fever or chills, feels well, good appetite  EYES:  Negative  blurry vision or double vision  CARDIOVASCULAR:  Negative for chest pain or palpitations  RESPIRATORY:  Negative for cough, wheezing, or SOB   GASTROINTESTINAL:  Negative for nausea, vomiting, diarrhea, constipation, or abdominal pain  GENITOURINARY:  Negative frequency, urgency or dysuria  NEUROLOGIC:  No headache, confusion, dizziness, lightheadedness      ANTIBIOTICS/RELEVANT:    MEDICATIONS  (STANDING):  atorvastatin 10 milliGRAM(s) Oral at bedtime  bisacodyl 5 milliGRAM(s) Oral at bedtime  bisacodyl Suppository 10 milliGRAM(s) Rectal once  ethambutol 1200 milliGRAM(s) Oral daily  famotidine    Tablet 10 milliGRAM(s) Oral daily  heparin   Injectable 5000 Unit(s) SubCutaneous every 8 hours  isoniazid 300 milliGRAM(s) Oral daily  metoprolol succinate ER 25 milliGRAM(s) Oral daily  polyethylene glycol 3350 17 Gram(s) Oral daily  pyrazinamide 1500 milliGRAM(s) Oral daily  rifAMPin 600 milliGRAM(s) Oral daily  senna 2 Tablet(s) Oral at bedtime  sodium chloride 0.9%. 1000 milliLiter(s) (50 mL/Hr) IV Continuous <Continuous>    MEDICATIONS  (PRN):  acetaminophen     Tablet .. 975 milliGRAM(s) Oral every 6 hours PRN Mild Pain (1 - 3)        Vital Signs Last 24 Hrs  T(C): 36.6 (07 Jan 2024 08:52), Max: 36.7 (06 Jan 2024 16:41)  T(F): 97.8 (07 Jan 2024 08:52), Max: 98.1 (06 Jan 2024 21:32)  HR: 96 (07 Jan 2024 09:00) (96 - 112)  BP: 152/69 (07 Jan 2024 09:00) (148/65 - 161/83)  BP(mean): 99 (07 Jan 2024 09:00) (94 - 115)  RR: 16 (07 Jan 2024 09:00) (16 - 18)  SpO2: 99% (07 Jan 2024 09:00) (97% - 99%)    Parameters below as of 07 Jan 2024 09:00  Patient On (Oxygen Delivery Method): room air        PHYSICAL EXAM:  Constitutional: non-toxic, no distress  Eyes:LIZY, EOMI  Ear/Nose/Throat: no oral lesion, no sinus tenderness on percussion	  Neck:  supple  Respiratory: CTA jose  Cardiovascular: S1S2 RRR, no murmurs  Gastrointestinal:soft, (+) BS, no HSM  Extremities:no e/e/c  Vascular: DP Pulse:	right normal; left normal      LABS:                        9.3    13.78 )-----------( 308      ( 07 Jan 2024 05:30 )             28.4     01-07    141  |  108  |  37<H>  ----------------------------<  152<H>  4.0   |  18<L>  |  2.13<H>    Ca    10.5      07 Jan 2024 05:30  Mg     1.8     01-07    TPro  7.5  /  Alb  3.1<L>  /  TBili  0.6  /  DBili  x   /  AST  17  /  ALT  <5<L>  /  AlkPhos  69  01-07      Urinalysis Basic - ( 07 Jan 2024 05:30 )    Color: x / Appearance: x / SG: x / pH: x  Gluc: 152 mg/dL / Ketone: x  / Bili: x / Urobili: x   Blood: x / Protein: x / Nitrite: x   Leuk Esterase: x / RBC: x / WBC x   Sq Epi: x / Non Sq Epi: x / Bacteria: x        MICROBIOLOGY:    Culture - Acid Fast - Tissue w/Smear (01.05.24 @ 08:44)    Specimen Source: Pleura #2 Right Pleura   Acid Fast Bacilli Smear:   No acid-fast bacilli seen by fluorochrome stain    Culture - Acid Fast - Body Fluid w/Smear (01.05.24 @ 08:20)    Specimen Source: .Body Fluid right pleural fluid   Acid Fast Bacilli Smear:   No acid-fast bacilli seen by fluorochrome stain        RADIOLOGY & ADDITIONAL STUDIES:

## 2024-01-07 NOTE — PROGRESS NOTE ADULT - ASSESSMENT
IMPRESSION:  73 year old M, Cantonese speaking, never a smoker, with PMHx of HTN, HLD, who is referred by Dr. Edward Jensen for an initial evaluation of pleural effusion. Patient reports right lower quadrant pain since Aug 2023, along with poor appetite, unintentionally weight loss around 20lbs, fatigue, progressively worse hoarseness and dyspnea. He had Shingles right arm in Oct 2023, and was subsequently diagnosed with vocal cord paralysis. No personal history of TB infection. Pt underwent R VATS, pleural biopsy decortication, on 1/5 with Dr. Kaiser. Intraop path revealed necrotizing granuloma. ID was consulted for w/u of necrotizing granuloma, and concern for active TB.    Recommend:  1.  Continue Rifampin 600 mg PO daily  2.  Continue  mg PO daily  3.  Continue Ethambutol 1200 mg PO daily  4.  Continue pyrazinamide 1500 mg PO daily   5.  Continue vitamin B6 50 mg PO daily  6.  Continue airborne precautions   7.  Follow up AFB sputum cultures   8.  Will discuss with AARON on 1/8/24    ID team 1 will follow

## 2024-01-07 NOTE — PROGRESS NOTE ADULT - ASSESSMENT
73 year old M, Cantonese speaking, never a smoker, with PMHx of HTN, HLD, who is referred by Dr. Edward Jensen for an initial evaluation of pleural effusion. Patient reports right lower quadrant pain since Aug 2023, along with poor appetite, unintentionally weight loss around 20lbs, fatigue, progressively worse hoarseness and dyspnea. He had Shingles right arm in Oct 2023, and was subsequently diagnosed with vocal cord paralysis. No personal history of TB infection. Pt underwent R VATS, pleural biopsy decortication, on 1/5 with Dr. Kaiser. Intraop path revealed necrotizing granuloma. Pt being worked up for TB, ID consulted. 2 CT left in place to suction with airleak. POD 1 awaiting third sputum culture to be sent, and will start TB treatment. CTx2 still with airleak and will remain to suction. POD 2 AFB sputum samples collected, awaiting results, started on abx TB regimen per ID.     Plan:    Neurovascular:   -Pain well controlled with current regimen. PRN's: tylenol, oxy    Cardiovascular:   -Hemodynamically stable.   -Monitor: BP, HR, tele  -HLD    -c/w lipitor  -HTN    -c/w home toprol 25 daily    Respiratory:   -Encourage continued use of IS 10x/hr and frequent ambulation  -CXR: post op changes  -CT management    -2 CT to remain suction     GI:  -GI PPX: pepcid  -PO Diet  -Bowel Regimen: dulcolax, senna, miralax    Renal / :  -Continue to monitor renal function: BUN/Cr 37/2.13  -Renal following    -f/u renal US    -Cr downtrending  -Monitor I/O's daily     Endocrine:    -No hx of DM or thyroid dx      Hematologic:  -CBC: H/H- 9.3/28.4  -Coagulation Panel.    ID:  -Temperature: afebrile  -CBC: WBC- 13  -necrotizing granuloma found in OR    -appreciate ID reccs    -RIPE TB treatment started-c/w iso until all negative result    -trend daily LFTs     -AARON will likely need to be contacted if pt is sent home of TB medication  -Continue to observe for SIRS/Sepsis Syndrome.    Prophylaxis:  -DVT prophylaxis with 5000 SubQ Heparin q8h.  -Continue with SCD's b/l while patient is at rest     Disposition:  -Discharge home once patient is medically ready

## 2024-01-07 NOTE — PROGRESS NOTE ADULT - ASSESSMENT
73 year old M, Cantonese speaking, never a smoker, with PMHx of HTN, HLD, CKD3,BPH,  Gout, hx Shingles right arm ( 10/2023), hx RLQ pain since ( 8/2023) along with poor appetite, unintentional weight loss around 20 lbs, and progressively worse hoarseness and dyspnea. recent EGD and colonoscopy unremarkable. Pt was referred by Heme Onc Dr. Edward Jenesn for an initial evaluation of right pleural effusion. Pt underwent 	R VATS RA Pleural biopsy and decortication (Frozen + Necrotizing granuloma) 1/5/24  POD 2     - 9LA for HD monitoring   - airborne isolation to rule out TB  - ID consult appreciated, Team 1 Dr. Alcala  - Sputum AFBx3 ( q8hr apart)   - started on RIPE Rx  - Please add Vitamin B6 50mg po daily since started in INH to decrease risk of INH induced neuropathy  - R CT x2 /f/u CXR in am reviewed   - O2 supplement as needed   - Renal consult appreciated, for NARA on CKD3 (baseline Cr 1.8) : S Cr baseline 1.91( 12/15/23)-> 2.71 ( 1/5)-> 2.52( 1/6)-> 2.13( 1/7), UA noted w. mild proteinuria, check ANCA labs per Renal  -  trend BMP for S Cr and avoid nephrotoxic meds/NSAIDS/iv contrast dye   - postop iv abx ppx: ceFAZolin   IVPB 2000 milliGRAM(s) IV Intermittent every 8 hours  - pain control   - acetaminophen     Tablet .. 975 milliGRAM(s) Oral every 6 hours PRN  - oxyCODONE    IR 5 milliGRAM(s) Oral every 6 hours PRN  - bowel regimen:   - bisacodyl 5 milliGRAM(s) Oral at bedtime  - polyethylene glycol 3350 17 Gram(s) Oral daily  - senna 2 Tablet(s) Oral at bedtime  - HLD: atorvastatin 10 milliGRAM(s) Oral at bedtime  - GI ppx: pantoprazole    Tablet 40 milliGRAM(s) Oral before breakfast  - DVT ppx: heparin   Injectable 5000 Unit(s) SubCutaneous every 8 hours    Contacts:   Referring Heme Onc Dr Joanne Jensen     POC as d/w family and CTS LORNE High            73 year old M, Cantonese speaking, never a smoker, with PMHx of HTN, HLD, CKD3,BPH,  Gout, hx Shingles right arm ( 10/2023), hx RLQ pain since ( 8/2023) along with poor appetite, unintentional weight loss around 20 lbs, and progressively worse hoarseness and dyspnea. recent EGD and colonoscopy unremarkable. Pt was referred by Heme Onc Dr. Edward Jensen for an initial evaluation of right pleural effusion. Pt underwent 	R VATS RA Pleural biopsy and decortication (Frozen + Necrotizing granuloma) 1/5/24  POD 2     - 9LA for HD monitoring   - airborne isolation to rule out TB  - ID consult appreciated, Team 1 Dr. Alcala  - Sputum AFBx3 ( q8hr apart)   - started on RIPE Rx  - Please add Vitamin B6 50mg po daily since started in INH to decrease risk of INH induced neuropathy  - R CT x2 /f/u CXR in am reviewed   - O2 supplement as needed   - Renal consult appreciated, for NARA on CKD3 (baseline Cr 1.8) : S Cr baseline 1.91( 12/15/23)-> 2.71 ( 1/5)-> 2.52( 1/6)-> 2.13( 1/7), UA noted w. mild proteinuria, check ANCA labs per Renal  -  trend BMP for S Cr and avoid nephrotoxic meds/NSAIDS/iv contrast dye   - postop iv abx ppx: ceFAZolin   IVPB 2000 milliGRAM(s) IV Intermittent every 8 hours  - pain control   - acetaminophen     Tablet .. 975 milliGRAM(s) Oral every 6 hours PRN  - oxyCODONE    IR 5 milliGRAM(s) Oral every 6 hours PRN  - bowel regimen:   - bisacodyl 5 milliGRAM(s) Oral at bedtime  - polyethylene glycol 3350 17 Gram(s) Oral daily  - senna 2 Tablet(s) Oral at bedtime  - HLD: atorvastatin 10 milliGRAM(s) Oral at bedtime  - GI ppx: pantoprazole    Tablet 40 milliGRAM(s) Oral before breakfast  - DVT ppx: heparin   Injectable 5000 Unit(s) SubCutaneous every 8 hours    Contacts:   Referring Heme Onc Dr Joanne Jensen     POC as d/w family and CTS [facility]

## 2024-01-08 PROBLEM — J90 PLEURAL EFFUSION, NOT ELSEWHERE CLASSIFIED: Chronic | Status: ACTIVE | Noted: 2024-01-04

## 2024-01-08 PROBLEM — R80.9 PROTEINURIA, UNSPECIFIED: Chronic | Status: ACTIVE | Noted: 2024-01-02

## 2024-01-08 PROBLEM — N40.0 BENIGN PROSTATIC HYPERPLASIA WITHOUT LOWER URINARY TRACT SYMPTOMS: Chronic | Status: ACTIVE | Noted: 2024-01-04

## 2024-01-08 PROBLEM — Z86.19 PERSONAL HISTORY OF OTHER INFECTIOUS AND PARASITIC DISEASES: Chronic | Status: ACTIVE | Noted: 2024-01-04

## 2024-01-08 PROBLEM — E78.5 HYPERLIPIDEMIA, UNSPECIFIED: Chronic | Status: ACTIVE | Noted: 2024-01-02

## 2024-01-08 PROBLEM — N20.0 CALCULUS OF KIDNEY: Chronic | Status: ACTIVE | Noted: 2024-01-02

## 2024-01-08 PROBLEM — M10.9 GOUT, UNSPECIFIED: Chronic | Status: ACTIVE | Noted: 2024-01-02

## 2024-01-08 PROBLEM — N18.30 CHRONIC KIDNEY DISEASE, STAGE 3 UNSPECIFIED: Chronic | Status: ACTIVE | Noted: 2024-01-02

## 2024-01-08 PROBLEM — I10 ESSENTIAL (PRIMARY) HYPERTENSION: Chronic | Status: ACTIVE | Noted: 2024-01-02

## 2024-01-08 LAB
1,3 BETA GLUCAN SER QL: ABNORMAL
1,3 BETA GLUCAN SER QL: ABNORMAL
A1C WITH ESTIMATED AVERAGE GLUCOSE RESULT: 5.2 % — SIGNIFICANT CHANGE UP (ref 4–5.6)
A1C WITH ESTIMATED AVERAGE GLUCOSE RESULT: 5.2 % — SIGNIFICANT CHANGE UP (ref 4–5.6)
ALBUMIN SERPL ELPH-MCNC: 3.3 G/DL — SIGNIFICANT CHANGE UP (ref 3.3–5)
ALBUMIN SERPL ELPH-MCNC: 3.3 G/DL — SIGNIFICANT CHANGE UP (ref 3.3–5)
ALP SERPL-CCNC: 79 U/L — SIGNIFICANT CHANGE UP (ref 40–120)
ALP SERPL-CCNC: 79 U/L — SIGNIFICANT CHANGE UP (ref 40–120)
ALT FLD-CCNC: <5 U/L — LOW (ref 10–45)
ALT FLD-CCNC: <5 U/L — LOW (ref 10–45)
ANION GAP SERPL CALC-SCNC: 13 MMOL/L — SIGNIFICANT CHANGE UP (ref 5–17)
ANION GAP SERPL CALC-SCNC: 13 MMOL/L — SIGNIFICANT CHANGE UP (ref 5–17)
AST SERPL-CCNC: 15 U/L — SIGNIFICANT CHANGE UP (ref 10–40)
AST SERPL-CCNC: 15 U/L — SIGNIFICANT CHANGE UP (ref 10–40)
BILIRUB SERPL-MCNC: 0.7 MG/DL — SIGNIFICANT CHANGE UP (ref 0.2–1.2)
BILIRUB SERPL-MCNC: 0.7 MG/DL — SIGNIFICANT CHANGE UP (ref 0.2–1.2)
BUN SERPL-MCNC: 34 MG/DL — HIGH (ref 7–23)
BUN SERPL-MCNC: 34 MG/DL — HIGH (ref 7–23)
CALCIUM SERPL-MCNC: 11.1 MG/DL — HIGH (ref 8.4–10.5)
CALCIUM SERPL-MCNC: 11.1 MG/DL — HIGH (ref 8.4–10.5)
CHLORIDE SERPL-SCNC: 111 MMOL/L — HIGH (ref 96–108)
CHLORIDE SERPL-SCNC: 111 MMOL/L — HIGH (ref 96–108)
CO2 SERPL-SCNC: 18 MMOL/L — LOW (ref 22–31)
CO2 SERPL-SCNC: 18 MMOL/L — LOW (ref 22–31)
CREAT SERPL-MCNC: 2.23 MG/DL — HIGH (ref 0.5–1.3)
CREAT SERPL-MCNC: 2.23 MG/DL — HIGH (ref 0.5–1.3)
EGFR: 31 ML/MIN/1.73M2 — LOW
EGFR: 31 ML/MIN/1.73M2 — LOW
ESTIMATED AVERAGE GLUCOSE: 103 MG/DL — SIGNIFICANT CHANGE UP (ref 68–114)
ESTIMATED AVERAGE GLUCOSE: 103 MG/DL — SIGNIFICANT CHANGE UP (ref 68–114)
FUNGITELL: 61 PG/ML
FUNGITELL: 61 PG/ML
GLUCOSE SERPL-MCNC: 104 MG/DL — HIGH (ref 70–99)
GLUCOSE SERPL-MCNC: 104 MG/DL — HIGH (ref 70–99)
HCT VFR BLD CALC: 30.8 % — LOW (ref 39–50)
HCT VFR BLD CALC: 30.8 % — LOW (ref 39–50)
HGB BLD-MCNC: 10 G/DL — LOW (ref 13–17)
HGB BLD-MCNC: 10 G/DL — LOW (ref 13–17)
MAGNESIUM SERPL-MCNC: 2.1 MG/DL — SIGNIFICANT CHANGE UP (ref 1.6–2.6)
MAGNESIUM SERPL-MCNC: 2.1 MG/DL — SIGNIFICANT CHANGE UP (ref 1.6–2.6)
MCHC RBC-ENTMCNC: 29.3 PG — SIGNIFICANT CHANGE UP (ref 27–34)
MCHC RBC-ENTMCNC: 29.3 PG — SIGNIFICANT CHANGE UP (ref 27–34)
MCHC RBC-ENTMCNC: 32.5 GM/DL — SIGNIFICANT CHANGE UP (ref 32–36)
MCHC RBC-ENTMCNC: 32.5 GM/DL — SIGNIFICANT CHANGE UP (ref 32–36)
MCV RBC AUTO: 90.3 FL — SIGNIFICANT CHANGE UP (ref 80–100)
MCV RBC AUTO: 90.3 FL — SIGNIFICANT CHANGE UP (ref 80–100)
NIGHT BLUE STAIN TISS: SIGNIFICANT CHANGE UP
NIGHT BLUE STAIN TISS: SIGNIFICANT CHANGE UP
NRBC # BLD: 0 /100 WBCS — SIGNIFICANT CHANGE UP (ref 0–0)
NRBC # BLD: 0 /100 WBCS — SIGNIFICANT CHANGE UP (ref 0–0)
PLATELET # BLD AUTO: 404 K/UL — HIGH (ref 150–400)
PLATELET # BLD AUTO: 404 K/UL — HIGH (ref 150–400)
POTASSIUM SERPL-MCNC: 4 MMOL/L — SIGNIFICANT CHANGE UP (ref 3.5–5.3)
POTASSIUM SERPL-MCNC: 4 MMOL/L — SIGNIFICANT CHANGE UP (ref 3.5–5.3)
POTASSIUM SERPL-SCNC: 4 MMOL/L — SIGNIFICANT CHANGE UP (ref 3.5–5.3)
POTASSIUM SERPL-SCNC: 4 MMOL/L — SIGNIFICANT CHANGE UP (ref 3.5–5.3)
PROT SERPL-MCNC: 8.1 G/DL — SIGNIFICANT CHANGE UP (ref 6–8.3)
PROT SERPL-MCNC: 8.1 G/DL — SIGNIFICANT CHANGE UP (ref 6–8.3)
RBC # BLD: 3.41 M/UL — LOW (ref 4.2–5.8)
RBC # BLD: 3.41 M/UL — LOW (ref 4.2–5.8)
RBC # FLD: 17 % — HIGH (ref 10.3–14.5)
RBC # FLD: 17 % — HIGH (ref 10.3–14.5)
SODIUM SERPL-SCNC: 142 MMOL/L — SIGNIFICANT CHANGE UP (ref 135–145)
SODIUM SERPL-SCNC: 142 MMOL/L — SIGNIFICANT CHANGE UP (ref 135–145)
SPECIMEN SOURCE: SIGNIFICANT CHANGE UP
SPECIMEN SOURCE: SIGNIFICANT CHANGE UP
TSH SERPL-MCNC: 0.93 UIU/ML — SIGNIFICANT CHANGE UP (ref 0.27–4.2)
TSH SERPL-MCNC: 0.93 UIU/ML — SIGNIFICANT CHANGE UP (ref 0.27–4.2)
WBC # BLD: 13.8 K/UL — HIGH (ref 3.8–10.5)
WBC # BLD: 13.8 K/UL — HIGH (ref 3.8–10.5)
WBC # FLD AUTO: 13.8 K/UL — HIGH (ref 3.8–10.5)
WBC # FLD AUTO: 13.8 K/UL — HIGH (ref 3.8–10.5)

## 2024-01-08 PROCEDURE — 71045 X-RAY EXAM CHEST 1 VIEW: CPT | Mod: 26

## 2024-01-08 PROCEDURE — 99233 SBSQ HOSP IP/OBS HIGH 50: CPT

## 2024-01-08 PROCEDURE — 99232 SBSQ HOSP IP/OBS MODERATE 35: CPT

## 2024-01-08 RX ORDER — AMLODIPINE BESYLATE 2.5 MG/1
5 TABLET ORAL DAILY
Refills: 0 | Status: DISCONTINUED | OUTPATIENT
Start: 2024-01-08 | End: 2024-01-08

## 2024-01-08 RX ORDER — HYDRALAZINE HCL 50 MG
10 TABLET ORAL EVERY 6 HOURS
Refills: 0 | Status: DISCONTINUED | OUTPATIENT
Start: 2024-01-08 | End: 2024-01-08

## 2024-01-08 RX ORDER — HYDRALAZINE HCL 50 MG
25 TABLET ORAL EVERY 8 HOURS
Refills: 0 | Status: DISCONTINUED | OUTPATIENT
Start: 2024-01-08 | End: 2024-01-17

## 2024-01-08 RX ORDER — PYRIDOXINE HCL (VITAMIN B6) 100 MG
50 TABLET ORAL DAILY
Refills: 0 | Status: DISCONTINUED | OUTPATIENT
Start: 2024-01-08 | End: 2024-01-17

## 2024-01-08 RX ORDER — ETHAMBUTOL HYDROCHLORIDE 400 MG/1
1200 TABLET, FILM COATED ORAL
Refills: 0 | Status: DISCONTINUED | OUTPATIENT
Start: 2024-01-08 | End: 2024-01-11

## 2024-01-08 RX ORDER — PYRAZINAMIDE 500 MG/1
1500 TABLET ORAL
Refills: 0 | Status: DISCONTINUED | OUTPATIENT
Start: 2024-01-08 | End: 2024-01-17

## 2024-01-08 RX ADMIN — Medication 10 MILLIGRAM(S): at 13:24

## 2024-01-08 RX ADMIN — OXYCODONE HYDROCHLORIDE 5 MILLIGRAM(S): 5 TABLET ORAL at 20:15

## 2024-01-08 RX ADMIN — FAMOTIDINE 10 MILLIGRAM(S): 10 INJECTION INTRAVENOUS at 11:02

## 2024-01-08 RX ADMIN — Medication 25 MILLIGRAM(S): at 17:34

## 2024-01-08 RX ADMIN — Medication 975 MILLIGRAM(S): at 15:54

## 2024-01-08 RX ADMIN — Medication 50 MILLIGRAM(S): at 12:45

## 2024-01-08 RX ADMIN — Medication 300 MILLIGRAM(S): at 11:03

## 2024-01-08 RX ADMIN — SENNA PLUS 2 TABLET(S): 8.6 TABLET ORAL at 22:58

## 2024-01-08 RX ADMIN — Medication 975 MILLIGRAM(S): at 02:42

## 2024-01-08 RX ADMIN — Medication 975 MILLIGRAM(S): at 22:56

## 2024-01-08 RX ADMIN — PYRAZINAMIDE 1500 MILLIGRAM(S): 500 TABLET ORAL at 11:03

## 2024-01-08 RX ADMIN — Medication 975 MILLIGRAM(S): at 16:24

## 2024-01-08 RX ADMIN — HEPARIN SODIUM 5000 UNIT(S): 5000 INJECTION INTRAVENOUS; SUBCUTANEOUS at 13:24

## 2024-01-08 RX ADMIN — HEPARIN SODIUM 5000 UNIT(S): 5000 INJECTION INTRAVENOUS; SUBCUTANEOUS at 07:00

## 2024-01-08 RX ADMIN — ETHAMBUTOL HYDROCHLORIDE 1200 MILLIGRAM(S): 400 TABLET, FILM COATED ORAL at 11:03

## 2024-01-08 RX ADMIN — AMLODIPINE BESYLATE 5 MILLIGRAM(S): 2.5 TABLET ORAL at 08:43

## 2024-01-08 RX ADMIN — HEPARIN SODIUM 5000 UNIT(S): 5000 INJECTION INTRAVENOUS; SUBCUTANEOUS at 22:22

## 2024-01-08 RX ADMIN — ATORVASTATIN CALCIUM 10 MILLIGRAM(S): 80 TABLET, FILM COATED ORAL at 22:57

## 2024-01-08 RX ADMIN — ETHAMBUTOL HYDROCHLORIDE 1200 MILLIGRAM(S): 400 TABLET, FILM COATED ORAL at 22:57

## 2024-01-08 RX ADMIN — Medication 25 MILLIGRAM(S): at 07:00

## 2024-01-08 RX ADMIN — Medication 5 MILLIGRAM(S): at 22:58

## 2024-01-08 NOTE — PROGRESS NOTE ADULT - ASSESSMENT
73Y M w/ hx of HTN s/p R VATS procedure for pleural biopsy decortication with pathology showing necrotizing granuloma and hospital, being tx as Active TB   H/c further c/b nonoliguric  prerenal/ATN underlying CKD (baseline sCr 1.9), initially with gradual improvement with IVF, now slight bump to 2.32 likely suspect 2/2 hemodynamic shifts given elevated BP   Also noted to be hypercalcemic       Nonoliguric ATN on underlying CKD (baseline sCr 1.9)   Now at 2.32 likely hemodynamic related given elevated BP   UA bland with low grade proteinuria 0.3g   Pending NAJMA, MPO, PR3, C-ANCA, P-ANCA given necrotizing granuloma    Renal sono with no hydro, increased echogenicity, right  kidney 11.5cm  with non obstructing calculi , left 9.4cm with nonobstructive calculi   Also noted to be Hypercalcemic, will w/u paraproteinemia w/u     Plan:  Encourage PO intake   Daily BMP   Hold ACE/ARBS and diuretics for now   Maintain SBP >65 for renal perfusion   Avoid bouts of hypotension   Strict ins and outs   Abx dose based on eGFR <30  Follow up Serologies   Please send the following for hypercalcemia   ·	Serum PTH   ·	PTHrP   ·	1,25 dihydroxyvitamin D   ·	SPEP  ·	UPEP  ·	Immunofixation   ·	Free light chains   ·	Urinary Calcium     HTN:  Likely due to Rifampin interaction as it decreased efficacy of Amlodipine and Metoprolol   Can start patient on 25mg TID with goal SBP <140/80 73Y M w/ hx of HTN s/p R VATS procedure for pleural biopsy decortication with pathology showing necrotizing granuloma and hospital, being tx as Active TB   H/c further c/b nonoliguric  prerenal/ATN underlying CKD (baseline sCr 1.9), initially with gradual improvement with IVF, now slight bump to 2.32 likely suspect 2/2 hemodynamic shifts given elevated BP   Also noted to be hypercalcemic       Nonoliguric ATN on underlying CKD (baseline sCr 1.9)   Now at 2.32 likely hemodynamic related given elevated BP   UA bland with low grade proteinuria 0.3g   Pending NAJMA, MPO, PR3, C-ANCA, P-ANCA given necrotizing granuloma    Renal sono with no hydro, increased echogenicity, right  kidney 11.5cm  with non obstructing calculi , left 9.4cm with nonobstructive calculi   Also noted to be Hypercalcemic, will w/u paraproteinemia w/u     Plan:  Encourage PO intake   Daily BMP   Hold ACE/ARBS and diuretics for now   Maintain SBP >65 for renal perfusion   Avoid bouts of hypotension   Strict ins and outs   Abx dose based on eGFR <30  Follow up Serologies   Please send the following for hypercalcemia   ·	Serum PTH   ·	PTHrP   ·	1,25 dihydroxyvitamin D   ·	SPEP  ·	UPEP  ·	Immunofixation   ·	Free light chains   ·	Urinary Calcium     HTN:  Likely due to Rifampin interaction as it decreased efficacy of Amlodipine and Metoprolol   Can start patient on hydralazine 25mg TID with goal SBP <140/80

## 2024-01-08 NOTE — PROGRESS NOTE ADULT - ASSESSMENT
73 year old M, Cantonese speaking, never a smoker, with PMHx of HTN, HLD, who is referred by Dr. Edward Jensen for an initial evaluation of pleural effusion. Patient reports right lower quadrant pain since Aug 2023, along with poor appetite, unintentionally weight loss around 20lbs, fatigue, progressively worse hoarseness and dyspnea. He had Shingles right arm in Oct 2023, and was subsequently diagnosed with vocal cord paralysis. No personal history of TB infection. Pt underwent R VATS, pleural biopsy decortication, on 1/5 with Dr. Kaiser. Intraop path revealed necrotizing granuloma. Pt being worked up for TB, ID consulted. 2 CT left in place to suction with airleak. POD 1 awaiting third sputum culture to be sent, and will start TB treatment. CTx2 still with airleak and will remain to suction. POD 2 AFB sputum samples collected, awaiting results, started on abx TB regimen per ID. On POD 3, AFB came back negative, CT remain to suction for continued air leak. Follow-up ID/Epidemiology recommendations for isolation and TB treatment     Plan:    Neurovascular:   -Pain well controlled with current regimen. PRN's: tylenol, oxy    Cardiovascular:   -Hemodynamically stable.   -Monitor: BP, HR, tele  -HLD    -c/w lipitor  -HTN    -c/w home toprol 25 daily    Respiratory:   -Encourage continued use of IS 10x/hr and frequent ambulation  -CXR: post op changes  -CT management    -2 CT to remain suction     GI:  -GI PPX: pepcid  -PO Diet  -Bowel Regimen: dulcolax, senna, miralax    Renal / :  -Continue to monitor renal function: BUN/Cr 37/2.13  -Renal following    -f/u renal US    -Cr downtrending  -Monitor I/O's daily     Endocrine:    -No hx of DM or thyroid dx      Hematologic:  -CBC: H/H- 9.3/28.4  -Coagulation Panel.    ID:  -Temperature: afebrile  -CBC: WBC- 13  -necrotizing granuloma found in OR    -appreciate ID reccs    -RIPE TB treatment started-c/w iso until all negative result    -trend daily LFTs     -[facility]ON will likely need to be contacted if pt is sent home of TB medication  -Continue to observe for SIRS/Sepsis Syndrome.    Prophylaxis:  -DVT prophylaxis with 5000 SubQ Heparin q8h.  -Continue with SCD's b/l while patient is at rest     Disposition:  -Discharge home once patient is medically ready   73 year old M, Cantonese speaking, never a smoker, with PMHx of HTN, HLD, who is referred by Dr. Edward Jensen for an initial evaluation of pleural effusion. Patient reports right lower quadrant pain since Aug 2023, along with poor appetite, unintentionally weight loss around 20lbs, fatigue, progressively worse hoarseness and dyspnea. He had Shingles right arm in Oct 2023, and was subsequently diagnosed with vocal cord paralysis. No personal history of TB infection. Pt underwent R VATS, pleural biopsy decortication, on 1/5 with Dr. Kaiser. Intraop path revealed necrotizing granuloma. Pt being worked up for TB, ID consulted. 2 CT left in place to suction with airleak. POD 1 awaiting third sputum culture to be sent, and will start TB treatment. CTx2 still with airleak and will remain to suction. POD 2 AFB sputum samples collected, awaiting results, started on abx TB regimen per ID. On POD 3, AFB came back negative, CT remain to suction for continued air leak. Follow-up ID/Epidemiology recommendations for isolation and TB treatment     Plan:    Neurovascular:   No current pain, has PRN APAP and oxycodone in place   No focal deficits, no delirium     Cardiovascular:   Hemodynamically stable over last 24 hours   - HR: 98  - BP: 164-167/78-85  HTN  - BP elevated to 160s, adding back home amlodipine   - continue with Toprol XL 25 mg   - will continue to hold home losartan   HLD:   - continue Lipitor 10 mg   Continue to monitor on telemetry     Respiratory:   POD 3 s/p R VATS RA Pleural Bx and Decortication   - frozen + for necrotizing granuloma intra-op   - 2 chest tubes in place to suction, with air leaks   -- Posterior: 15 cc overnight,   -- Basilar: 35 cc overnight   - AM CXR demonstrated R apical PTX  Encourage IS and Ambulation     GI:  Tolerating diet well  GI PPx: Pepcid   Continue with bowel regimen     Renal / :  BUN/CR at 34/2.23, noted to be slightly up from yesterday   - per patient daughter, Cr 1.9 in mid december   - nephro consulted, appreciate recs   - holding nephrotoxic medications   - renal recommending MAPs > 65 for renal perfusion   Monitor I/Os daily   -  cc     Endocrine:    No Hx of DM or Thyroid Disease   - A1C: pending  - TSH: pending    Hematologic:  H/h stable @ 10/30.9, no evidence of bleeding   DVT PPx: SQH 5000U SCDs     ID:  Afebrile, WBC 13.8  Tuberculosis  - + necrotizing granuloma in OR  - ID consulted, appreciate recs   - currently on RIPE therapy   - 3 AFBs negative, will follow-up with epidemiology about isolation   Continue to observe for SIRS/Sepsis Syndrome.    Disposition:  -Discharge home once patient is medically ready   73 year old M, Cantonese speaking, never a smoker, with PMHx of HTN, HLD, who is referred by Dr. Edward Jensen for an initial evaluation of pleural effusion. Patient reports right lower quadrant pain since Aug 2023, along with poor appetite, unintentionally weight loss around 20lbs, fatigue, progressively worse hoarseness and dyspnea. He had Shingles right arm in Oct 2023, and was subsequently diagnosed with vocal cord paralysis. No personal history of TB infection. Pt underwent R VATS, pleural biopsy decortication, on 1/5 with Dr. Kaiser. Intraop path revealed necrotizing granuloma. Pt being worked up for TB, ID consulted. 2 CT left in place to suction with airleak. POD 1 awaiting third sputum culture to be sent, and will start TB treatment. CTx2 still with airleak and will remain to suction. POD 2 AFB sputum samples collected, awaiting results, started on abx TB regimen per ID. On POD 3, AFB came back negative, CT remain to suction for continued air leak. Follow-up ID/Epidemiology recommendations for isolation and TB treatment     Plan:    Neurovascular:   No current pain, has PRN APAP and oxycodone in place   No focal deficits, no delirium     Cardiovascular:   Hemodynamically stable over last 24 hours   - HR: 98  - BP: 164-167/78-85  HTN  - BP elevated to 160s, adding back home amlodipine   - continue with Toprol XL 25 mg   - will continue to hold home losartan   HLD:   - continue Lipitor 10 mg   Continue to monitor on telemetry     Respiratory:   POD 3 s/p R VATS RA Pleural Bx and Decortication   - frozen + for necrotizing granuloma intra-op   - 2 chest tubes in place to suction, with air leaks   -- Posterior: 15 cc overnight,   -- Basilar: 35 cc overnight   - AM CXR demonstrated R apical PTX  Encourage IS and Ambulation     GI:  Tolerating diet well  GI PPx: Pepcid   Continue with bowel regimen     Renal / :  BUN/CR at 34/2.23, noted to be slightly up from yesterday   - per patient daughter, Cr 1.9 in mid december   - nephro consulted, appreciate recs   - holding nephrotoxic medications   - renal recommending MAPs > 65 for renal perfusion   Monitor I/Os daily   -  cc     Endocrine:    No Hx of DM or Thyroid Disease   - A1C: pending  - TSH: pending    Hematologic:  H/h stable @ 10/30.9, no evidence of bleeding   DVT PPx: SQH 5000U SCDs     ID:  Afebrile, WBC 13.8  Tuberculosis  - + necrotizing granuloma in OR  - ID consulted, appreciate recs   - currently on RIPE therapy   - 1 sputum AFB negative, needs 2 more per epidemiology  Continue to observe for SIRS/Sepsis Syndrome.    Disposition:  -Discharge home once patient is medically ready

## 2024-01-08 NOTE — PROGRESS NOTE ADULT - ASSESSMENT
[de-identified] : NSR, rate 86 [de-identified] : 7/30/2021-8/28/2021 POST-TAVR [facility]  5 episodes of SVT with fastest run at 170 BPM, 1 episode of VT occurred with fastest run at 108 BPM, 2% PAC burden and 8% PVC burden. There was no AF, heart block, or pauses. No tracings after 8/11/2021.  [de-identified] : 9/9/2021 DONE TODAY, RESULTS PENDING. \par \par 7/29/2021 Endocardial visualization enhanced with intravenous injection of Ultrasonic Enhancing Agent (Definity). Hyperdynamic left ventricle. s/p transcatheter aortic valve replacement, within previous TAVR, with normal function. Mild-moderate mitral stenosis due to annular calcification. Mild-moderate pulmonary hypertension. \par  [de-identified] : 6/3/2021 CT HEART W/O CORONARIES PRE-TAVR  right renal cyst measures above fluid density, ultrasound needed for complete evaluation.\par  [de-identified] : 7/9/2021 CATH The coronary circulation is right dominant. LM: Normal. LAD: This vessel was poorly visualized. Angiography showed minor luminal irregularities with no flow limiting lesions. Circumflex: Angiography showed minor luminal irregularities with no flow limiting lesions. Proximal circumflex: Angiography showed minor luminal irregularities with no flow limiting lesions. RCA: Angiography showed minor luminal irregularities with no flow limiting lesions. \par  73 year old M, Cantonese speaking, never a smoker, with PMHx of HTN, HLD, CKD3,BPH,  Gout, hx Shingles right arm ( 10/2023), hx RLQ pain since ( 8/2023) along with poor appetite, unintentional weight loss around 20 lbs, and progressively worse hoarseness and dyspnea. recent EGD and colonoscopy unremarkable. Pt was referred by Heme Onc Dr. Edward Jensen for an initial evaluation of right pleural effusion. Pt underwent 	R VATS RA Pleural biopsy and decortication (Frozen + Necrotizing granuloma) 1/5/24  POD 2     - 9LA for HD monitoring   - airborne isolation to rule out TB  - ID f/u appreciated, Team 1 Dr. Alcala/  - Sputum AFBx3 ( q8hr apart)   - OR Cx: AFB smears negative x2 , f/u AFB Cx  - started on RIPE Rx +Vitamin B6 50mg po daily   - R CT x2 /f/u CXR in am reviewed   - O2 supplement as needed   - Renal consult appreciated, for NARA on CKD3 (baseline Cr 1.8) : S Cr baseline 1.91( 12/15/23)-> 2.71 ( 1/5)-> 2.52( 1/6)-> 2.13( 1/7)-> 2.23( 1/8) , UA noted w. mild proteinuria, check ANCA labs per Renal  -  trend BMP for S Cr and avoid nephrotoxic meds/NSAIDS/iv contrast dye   - postop iv abx ppx: ceFAZolin   IVPB 2000 milliGRAM(s) IV Intermittent every 8 hours x3 completed   - pain control   - acetaminophen     Tablet .. 975 milliGRAM(s) Oral every 6 hours PRN  - oxyCODONE    IR 5 milliGRAM(s) Oral every 6 hours PRN  - bowel regimen:   - bisacodyl 5 milliGRAM(s) Oral at bedtime  - polyethylene glycol 3350 17 Gram(s) Oral daily  - senna 2 Tablet(s) Oral at bedtime  - HTN:   - amLODIPine   Tablet 5 milliGRAM(s) Oral daily  - metoprolol succinate ER 25 milliGRAM(s) Oral daily  - HLD: atorvastatin 10 milliGRAM(s) Oral at bedtime  - GI ppx: pantoprazole    Tablet 40 milliGRAM(s) Oral before breakfast  - DVT ppx: heparin   Injectable 5000 Unit(s) SubCutaneous every 8 hours    Contacts:   Referring Heme Onc Dr Joanne Jensen     POC as d/w family and CTS PA           [de-identified] : 7/28/2021 PATSY using a 23 mm Ramon 3 Ultra bioprosthesis [de-identified] : 7/21/2021 CAROTID DUPLEX No significant hemodynamic stenosis of either carotid artery.\par

## 2024-01-08 NOTE — PROGRESS NOTE ADULT - SUBJECTIVE AND OBJECTIVE BOX
Patient is a 73y Male seen and evaluated at bedside. No acute distress, does not offer any complaints. Tolerating PO diet, currently being tx for Active TB, BP elevated, sCr light uptick to 2.32      Meds:    acetaminophen     Tablet .. 975 every 6 hours PRN  atorvastatin 10 at bedtime  bisacodyl 5 at bedtime  ethambutol 1200 daily  famotidine    Tablet 10 daily  heparin   Injectable 5000 every 8 hours  hydrALAZINE 10 every 6 hours  isoniazid 300 daily  oxyCODONE    IR 5 every 6 hours PRN  polyethylene glycol 3350 17 daily  pyrazinamide 1500 daily  pyridoxine 50 daily  rifAMPin 600 daily  senna 2 at bedtime      T(C): , Max: 36.6 (01-07-24 @ 20:53)  T(F): , Max: 97.8 (01-07-24 @ 20:53)  HR: 98 (01-08-24 @ 08:42)  BP: 162/79 (01-08-24 @ 08:42)  BP(mean): 113 (01-08-24 @ 08:42)  RR: 18 (01-08-24 @ 08:42)  SpO2: 99% (01-08-24 @ 08:42)  Wt(kg): --    01-07 @ 07:01  -  01-08 @ 07:00  --------------------------------------------------------  IN: 915 mL / OUT: 460 mL / NET: 455 mL    01-08 @ 07:01  -  01-08 @ 12:53  --------------------------------------------------------  IN: 500 mL / OUT: 170 mL / NET: 330 mL          Review of Systems:  ROS negative except as per HPI      PHYSICAL EXAM:  GENERAL: NAD, sitting in bed   NECK: supple, No JVD  CHEST/LUNG: Clear to auscultation bilaterally, decreased breath sounds, chest tubes in place draining  serosang output  HEART: normal S1S2, RRR  ABDOMEN: Soft, Nontender   EXTREMITIES: No clubbing, cyanosis, or edema   NEUROLOGY: AAO x3, no focal neurological deficit        LABS:                        10.0   13.80 )-----------( 404      ( 08 Jan 2024 05:30 )             30.8     01-08    142  |  111<H>  |  34<H>  ----------------------------<  104<H>  4.0   |  18<L>  |  2.23<H>    Ca    11.1<H>      08 Jan 2024 05:30  Mg     2.1     01-08    TPro  8.1  /  Alb  3.3  /  TBili  0.7  /  DBili  x   /  AST  15  /  ALT  <5<L>  /  AlkPhos  79  01-08        Urinalysis Basic - ( 08 Jan 2024 05:30 )    Color: x / Appearance: x / SG: x / pH: x  Gluc: 104 mg/dL / Ketone: x  / Bili: x / Urobili: x   Blood: x / Protein: x / Nitrite: x   Leuk Esterase: x / RBC: x / WBC x   Sq Epi: x / Non Sq Epi: x / Bacteria: x            RADIOLOGY & ADDITIONAL STUDIES:           Patient is a 73y Male seen and evaluated at bedside. No acute distress, does not offer any complaints. Tolerating PO diet, currently being tx for Active TB, BP elevated, sCr light uptick to 2.32  UOP 1675ml (1/7)    Meds:    acetaminophen     Tablet .. 975 every 6 hours PRN  atorvastatin 10 at bedtime  bisacodyl 5 at bedtime  ethambutol 1200 daily  famotidine    Tablet 10 daily  heparin   Injectable 5000 every 8 hours  hydrALAZINE 10 every 6 hours  isoniazid 300 daily  oxyCODONE    IR 5 every 6 hours PRN  polyethylene glycol 3350 17 daily  pyrazinamide 1500 daily  pyridoxine 50 daily  rifAMPin 600 daily  senna 2 at bedtime      T(C): , Max: 36.6 (01-07-24 @ 20:53)  T(F): , Max: 97.8 (01-07-24 @ 20:53)  HR: 98 (01-08-24 @ 08:42)  BP: 162/79 (01-08-24 @ 08:42)  BP(mean): 113 (01-08-24 @ 08:42)  RR: 18 (01-08-24 @ 08:42)  SpO2: 99% (01-08-24 @ 08:42)  Wt(kg): --    01-07 @ 07:01  -  01-08 @ 07:00  --------------------------------------------------------  IN: 915 mL / OUT: 460 mL / NET: 455 mL    01-08 @ 07:01  -  01-08 @ 12:53  --------------------------------------------------------  IN: 500 mL / OUT: 170 mL / NET: 330 mL          Review of Systems:  ROS negative except as per HPI      PHYSICAL EXAM:  GENERAL: NAD, sitting in bed   NECK: supple, No JVD  CHEST/LUNG: Clear to auscultation bilaterally, decreased breath sounds, chest tubes in place draining  serosang output  HEART: normal S1S2, RRR  ABDOMEN: Soft, Nontender   EXTREMITIES: No clubbing, cyanosis, or edema   NEUROLOGY: AAO x3, no focal neurological deficit        LABS:                        10.0   13.80 )-----------( 404      ( 08 Jan 2024 05:30 )             30.8     01-08    142  |  111<H>  |  34<H>  ----------------------------<  104<H>  4.0   |  18<L>  |  2.23<H>    Ca    11.1<H>      08 Jan 2024 05:30  Mg     2.1     01-08    TPro  8.1  /  Alb  3.3  /  TBili  0.7  /  DBili  x   /  AST  15  /  ALT  <5<L>  /  AlkPhos  79  01-08        Urinalysis Basic - ( 08 Jan 2024 05:30 )    Color: x / Appearance: x / SG: x / pH: x  Gluc: 104 mg/dL / Ketone: x  / Bili: x / Urobili: x   Blood: x / Protein: x / Nitrite: x   Leuk Esterase: x / RBC: x / WBC x   Sq Epi: x / Non Sq Epi: x / Bacteria: x            RADIOLOGY & ADDITIONAL STUDIES:

## 2024-01-08 NOTE — PROGRESS NOTE ADULT - SUBJECTIVE AND OBJECTIVE BOX
Patient discussed on morning rounds with Dr. Arthur    Operation / Date: R VATS RA Pleural Bx and Decortication, frozen + for necrotizing granuloma    SUBJECTIVE ASSESSMENT:  73y Male with no acute complaints, reports mild discomfort around chest tube sites with pain controlled on currently regimen. Ambulating well, + void, no BM   Denies any fevers, chills, headache, lightheadedness, dizziness, chest pain, shortness of breath, abdominal pain, nausea, vomiting, changes in bowel or bladder, paresthesias, or any other acute complaint.      Vital Signs Last 24 Hrs  T(C): 36.4 (08 Jan 2024 05:17), Max: 36.6 (07 Jan 2024 20:53)  T(F): 97.5 (08 Jan 2024 05:17), Max: 97.8 (07 Jan 2024 20:53)  HR: 98 (08 Jan 2024 05:42) (98 - 100)  BP: 167/87 (08 Jan 2024 05:42) (162/85 - 168/78)  BP(mean): 120 (08 Jan 2024 05:42) (112 - 120)  RR: 16 (08 Jan 2024 05:42) (15 - 16)  SpO2: 97% (08 Jan 2024 05:42) (96% - 99%)    Parameters below as of 08 Jan 2024 05:42  Patient On (Oxygen Delivery Method): room air      I&O's Detail    07 Jan 2024 07:01  -  08 Jan 2024 07:00  --------------------------------------------------------  IN:    Oral Fluid: 715 mL    sodium chloride 0.9%: 200 mL  Total IN: 915 mL    OUT:    Chest Tube (mL): 30 mL    Chest Tube (mL): 80 mL    Voided (mL): 350 mL  Total OUT: 460 mL    Total NET: 455 mL      CHEST TUBE:  YES   - Posterior: suction, 1/5 air leak   - Basilar: suction, 1/5 air leak   DEON DRAIN:  No.  EPICARDIAL WIRES: No.  TIE DOWNS: Yes  TERRY: No.    PHYSICAL EXAM:    General: Sitting in chair comfortably in NAD  Neuro: A&Ox3, no focal deficits   HEENT: NCAT, EOMI   Cardiac: Regular rate and rhythm, normal S1 and S2. No m/r/g   Pulm: Breathing comfortably on RA. No signs of respiratory distress. Decreased breath sounds appreciated at R apex, remainder of lung fields with breath sounds B/l, no w/r/r. Both chest tubes to suction with serosang output, + air leak    Abdomen: Soft, non-distended, non-tender.  : No terry  Extremities: Warm and well perfused, no peripheral edema, distal pulses 2+. No calf tenderness. SCDs and TEDs in place  MSK: Full AROM   Wound: thorascoopic incisions are c/d/i without erythema or drainage. Chest tube dressing c/d/i      LABS:                        10.0   13.80 )-----------( 404      ( 08 Jan 2024 05:30 )             30.8         01-08    142  |  111<H>  |  34<H>  ----------------------------<  104<H>  4.0   |  18<L>  |  2.23<H>    Ca    11.1<H>      08 Jan 2024 05:30  Mg     2.1     01-08    TPro  8.1  /  Alb  3.3  /  TBili  0.7  /  DBili  x   /  AST  15  /  ALT  <5<L>  /  AlkPhos  79  01-08      Urinalysis Basic - ( 08 Jan 2024 05:30 )    Color: x / Appearance: x / SG: x / pH: x  Gluc: 104 mg/dL / Ketone: x  / Bili: x / Urobili: x   Blood: x / Protein: x / Nitrite: x   Leuk Esterase: x / RBC: x / WBC x   Sq Epi: x / Non Sq Epi: x / Bacteria: x        MEDICATIONS  (STANDING):  amLODIPine   Tablet 5 milliGRAM(s) Oral daily  atorvastatin 10 milliGRAM(s) Oral at bedtime  bisacodyl 5 milliGRAM(s) Oral at bedtime  ethambutol 1200 milliGRAM(s) Oral daily  famotidine    Tablet 10 milliGRAM(s) Oral daily  heparin   Injectable 5000 Unit(s) SubCutaneous every 8 hours  isoniazid 300 milliGRAM(s) Oral daily  metoprolol succinate ER 25 milliGRAM(s) Oral daily  polyethylene glycol 3350 17 Gram(s) Oral daily  pyrazinamide 1500 milliGRAM(s) Oral daily  pyridoxine 50 milliGRAM(s) Oral daily  rifAMPin 600 milliGRAM(s) Oral daily  senna 2 Tablet(s) Oral at bedtime    MEDICATIONS  (PRN):  acetaminophen     Tablet .. 975 milliGRAM(s) Oral every 6 hours PRN Mild Pain (1 - 3)  oxyCODONE    IR 5 milliGRAM(s) Oral every 6 hours PRN Moderate Pain (4 - 6)        RADIOLOGY & ADDITIONAL TESTS:

## 2024-01-08 NOTE — PROGRESS NOTE ADULT - SUBJECTIVE AND OBJECTIVE BOX
Consultation Requested by:    Patient is a 73y old  Male who presents with a chief complaint of elective surgery :  Right Video-Assisted Thoracoscopic Surgery, Robotic Assisted, Right pleural biopsy, Pleural effusion drainage, possible pleurodesis and PleurX placement (05 Jan 2024 12:37)    HPI:  GUILLAUME MANNING is a 73 year old M, Cantonese speaking, never a smoker, with PMHx of HTN, HLD, who is referred by Dr. Edward Jensen for an initial evaluation of pleural effusion.  ?  Patient reports right lower quadrant pain since Aug 2023, along with poor appetite, unintentionally weight loss around 20lbs, fatigue, progressively worse hoarseness and dyspnea. He had Shingles right arm in Oct 2023, followed by generalized hives. Recent EGD and colonoscopy unremarkable. Denies chronic cough, nausea, vomiting. No personal history of TB infection.    Patient seen in same day holding area; Reports no changes to PMHx or medications since last seen by our team. Denies acute or current SOB, chest pain, palpitation, N/V/D, fever/chills, recent illness, or any other concerning symptoms.  (02 Jan 2024 16:05)    Interval Events: Used [employer], ID: [id number]. Patient seen with ID team. Patient reports no new complaints, and has no fever/chills or cough. No questions. Is denying chills, night-sweats, cough currently, SOB, abdominal pain, nausea, vomiting, diarrhea, dysuria, hematuria, or new rash.        Allergies    No Known Allergies    Intolerances      Antimicrobials:  ceFAZolin   IVPB 2000 milliGRAM(s) IV Intermittent every 8 hours      Other Medications:  acetaminophen     Tablet .. 975 milliGRAM(s) Oral every 6 hours PRN  atorvastatin 10 milliGRAM(s) Oral at bedtime  bisacodyl 5 milliGRAM(s) Oral at bedtime  heparin   Injectable 5000 Unit(s) SubCutaneous every 8 hours  lactated ringers Bolus 1000 milliLiter(s) IV Bolus once  lactated ringers. 1000 milliLiter(s) IV Continuous <Continuous>  oxyCODONE    IR 5 milliGRAM(s) Oral every 6 hours PRN  pantoprazole    Tablet 40 milliGRAM(s) Oral before breakfast  polyethylene glycol 3350 17 Gram(s) Oral daily  senna 2 Tablet(s) Oral at bedtime      FAMILY HISTORY:  No pertinent family history in first degree relatives      PAST MEDICAL & SURGICAL HISTORY:  HTN (hypertension)      Renal stones      Gout      Proteinuria      Hyperlipidemia      Stage 3 chronic kidney disease      BPH (benign prostatic hyperplasia)      Pleural effusion  right      History of shingles  10/2023      No significant past surgical history        SOCIAL HISTORY:  Reports born in [place], moved here in 1985  Reports questionable hx of ever tested/ exposed to TB  Lives w family      Vital Signs Last 24 Hrs  T(C): 36.4 (08 Jan 2024 09:44), Max: 36.6 (07 Jan 2024 20:53)  T(F): 97.6 (08 Jan 2024 09:44), Max: 97.8 (07 Jan 2024 20:53)  HR: 92 (08 Jan 2024 12:50) (92 - 98)  BP: 141/79 (08 Jan 2024 12:50) (141/79 - 168/78)  BP(mean): 102 (08 Jan 2024 12:50) (102 - 120)  RR: 18 (08 Jan 2024 12:50) (16 - 18)  SpO2: 99% (08 Jan 2024 12:50) (96% - 99%)    Parameters below as of 08 Jan 2024 12:50  Patient On (Oxygen Delivery Method): room air      PHYSICAL EXAM:  Constitutional: AAOx3. Appears cachectic.  HEENT: NC/AT  Respiratory: CTA B/L. No crackles or wheezes. Chest tube in place.  Cardiovascular: RRR, normal S1 and S2, no m/r/g.   Gastrointestinal: +BS, soft NTND, no guarding or rebound tenderness, no palpable masses   Extremities: wwp; no cyanosis, clubbing or edema.   Vascular: Pulses equal and strong throughout.   Neurological: AAOx3  Skin: No gross skin abnormalities or rashes        LABS:                         10.0   13.80 )-----------( 404      ( 08 Jan 2024 05:30 )             30.8     01-08    142  |  111<H>  |  34<H>  ----------------------------<  104<H>  4.0   |  18<L>  |  2.23<H>    Ca    11.1<H>      08 Jan 2024 05:30  Mg     2.1     01-08    TPro  8.1  /  Alb  3.3  /  TBili  0.7  /  DBili  x   /  AST  15  /  ALT  <5<L>  /  AlkPhos  79  01-08      Urinalysis Basic - ( 08 Jan 2024 05:30 )    Color: x / Appearance: x / SG: x / pH: x  Gluc: 104 mg/dL / Ketone: x  / Bili: x / Urobili: x   Blood: x / Protein: x / Nitrite: x   Leuk Esterase: x / RBC: x / WBC x   Sq Epi: x / Non Sq Epi: x / Bacteria: x                RADIOLOGY, EKG & ADDITIONAL TESTS:         Urinalysis Basic - ( 05 Jan 2024 11:32 )    Color: x / Appearance: x / SG: x / pH: x  Gluc: 113 mg/dL / Ketone: x  / Bili: x / Urobili: x   Blood: x / Protein: x / Nitrite: x   Leuk Esterase: x / RBC: x / WBC x   Sq Epi: x / Non Sq Epi: x / Bacteria: x       Consultation Requested by:    Patient is a 73y old  Male who presents with a chief complaint of elective surgery :  Right Video-Assisted Thoracoscopic Surgery, Robotic Assisted, Right pleural biopsy, Pleural effusion drainage, possible pleurodesis and PleurX placement (05 Jan 2024 12:37)    HPI:  GUILLAUME MANNING is a 73 year old M, Cantonese speaking, never a smoker, with PMHx of HTN, HLD, who is referred by Dr. Edward Jensen for an initial evaluation of pleural effusion.  ?  Patient reports right lower quadrant pain since Aug 2023, along with poor appetite, unintentionally weight loss around 20lbs, fatigue, progressively worse hoarseness and dyspnea. He had Shingles right arm in Oct 2023, followed by generalized hives. Recent EGD and colonoscopy unremarkable. Denies chronic cough, nausea, vomiting. No personal history of TB infection.    Patient seen in same day holding area; Reports no changes to PMHx or medications since last seen by our team. Denies acute or current SOB, chest pain, palpitation, N/V/D, fever/chills, recent illness, or any other concerning symptoms.  (02 Jan 2024 16:05)    Interval Events: Used [employer], ID: [id number]. Patient seen with ID team. Patient reports no new complaints, and has no fever/chills or cough. No questions. Is denying chills, night-sweats, cough currently, SOB, abdominal pain, nausea, vomiting, diarrhea, dysuria, hematuria, or new rash.        Allergies    No Known Allergies    Intolerances      Antimicrobials:  ceFAZolin   IVPB 2000 milliGRAM(s) IV Intermittent every 8 hours      Other Medications:  acetaminophen     Tablet .. 975 milliGRAM(s) Oral every 6 hours PRN  atorvastatin 10 milliGRAM(s) Oral at bedtime  bisacodyl 5 milliGRAM(s) Oral at bedtime  heparin   Injectable 5000 Unit(s) SubCutaneous every 8 hours  lactated ringers Bolus 1000 milliLiter(s) IV Bolus once  lactated ringers. 1000 milliLiter(s) IV Continuous <Continuous>  oxyCODONE    IR 5 milliGRAM(s) Oral every 6 hours PRN  pantoprazole    Tablet 40 milliGRAM(s) Oral before breakfast  polyethylene glycol 3350 17 Gram(s) Oral daily  senna 2 Tablet(s) Oral at bedtime      FAMILY HISTORY:  No pertinent family history in first degree relatives      PAST MEDICAL & SURGICAL HISTORY:  HTN (hypertension)      Renal stones      Gout      Proteinuria      Hyperlipidemia      Stage 3 chronic kidney disease      BPH (benign prostatic hyperplasia)      Pleural effusion  right      History of shingles  10/2023      No significant past surgical history        SOCIAL HISTORY:  Reports born in [place], moved here in 1985  Reports questionable hx of ever tested/ exposed to TB  Lives w family      Vital Signs Last 24 Hrs  T(C): 36.4 (08 Jan 2024 09:44), Max: 36.6 (07 Jan 2024 20:53)  T(F): 97.6 (08 Jan 2024 09:44), Max: 97.8 (07 Jan 2024 20:53)  HR: 92 (08 Jan 2024 12:50) (92 - 98)  BP: 141/79 (08 Jan 2024 12:50) (141/79 - 168/78)  BP(mean): 102 (08 Jan 2024 12:50) (102 - 120)  RR: 18 (08 Jan 2024 12:50) (16 - 18)  SpO2: 99% (08 Jan 2024 12:50) (96% - 99%)    Parameters below as of 08 Jan 2024 12:50  Patient On (Oxygen Delivery Method): room air      PHYSICAL EXAM:  Constitutional: AAOx3. Appears cachectic.  HEENT: NC/AT  Respiratory: CTA B/L. No crackles or wheezes. Chest tube in place.  Cardiovascular: RRR, normal S1 and S2, no m/r/g.   Gastrointestinal: +BS, soft NTND, no guarding or rebound tenderness, no palpable masses   Extremities: wwp; no cyanosis, clubbing or edema.   Vascular: Pulses equal and strong throughout.   Neurological: AAOx3  Skin: No gross skin abnormalities or rashes        LABS:                         10.0   13.80 )-----------( 404      ( 08 Jan 2024 05:30 )             30.8     01-08    142  |  111<H>  |  34<H>  ----------------------------<  104<H>  4.0   |  18<L>  |  2.23<H>    Ca    11.1<H>      08 Jan 2024 05:30  Mg     2.1     01-08    TPro  8.1  /  Alb  3.3  /  TBili  0.7  /  DBili  x   /  AST  15  /  ALT  <5<L>  /  AlkPhos  79  01-08      Urinalysis Basic - ( 08 Jan 2024 05:30 )    Color: x / Appearance: x / SG: x / pH: x  Gluc: 104 mg/dL / Ketone: x  / Bili: x / Urobili: x   Blood: x / Protein: x / Nitrite: x   Leuk Esterase: x / RBC: x / WBC x   Sq Epi: x / Non Sq Epi: x / Bacteria: x                RADIOLOGY, EKG & ADDITIONAL TESTS:         Urinalysis Basic - ( 05 Jan 2024 11:32 )    Color: x / Appearance: x / SG: x / pH: x  Gluc: 113 mg/dL / Ketone: x  / Bili: x / Urobili: x   Blood: x / Protein: x / Nitrite: x   Leuk Esterase: x / RBC: x / WBC x   Sq Epi: x / Non Sq Epi: x / Bacteria: x

## 2024-01-08 NOTE — PROGRESS NOTE ADULT - ASSESSMENT
73 year old M, Cantonese speaking, never a smoker, with PMHx of HTN, HLD, who is referred by Dr. Edward Jensen for an initial evaluation of pleural effusion. Patient reports right lower quadrant pain since Aug 2023, along with poor appetite, unintentionally weight loss around 20lbs, fatigue, progressively worse hoarseness and dyspnea. He had Shingles right arm in Oct 2023, and was subsequently diagnosed with vocal cord paralysis. No personal history of TB infection. Pt underwent R VATS, pleural biopsy decortication, on 1/5 with Dr. Kaiser. Intraop path revealed necrotizing granuloma. ID was consulted for w/u of necrotizing granuloma, and concern for active TB.      #Necrotizing granuloma/ C/f active TB. Patient w constitutional sx such as unintentional weight loss, vocal cord dysfunction, as well as dyspnea on exertion  - F/u AFB, currently NGTD  - Change ethambutol and pyrazinamide to 3x weekly, M/W/F dosing is acceptable  - C/w  600mg rifampin qd, 300mg Isoniazid qd, 1200mg qd of ethambutol M/W/F, 1500mg M/W/F of pyrazinimide  - C/w pyridoxine to prevent adverse s/e from Isoniazid therapy, 50mg qd  - F/u CT chest non-contrast to assess for cavitary lesions  - There are some other etiologies as opposed to M.tuberculosis, we would also recommend serum Fungitell, as well as Histoplasma Ag urine  - We will touch base w AARON today    ID will continue to follow,    RECS NOT FINAL UNTIL ATTENDING ATTESTATION

## 2024-01-08 NOTE — PROGRESS NOTE ADULT - SUBJECTIVE AND OBJECTIVE BOX
Patient is a 73y old  Male who presents with a chief complaint of elective surgery :  Right Video-Assisted Thoracoscopic Surgery, Robotic Assisted, Right pleural biopsy, Pleural effusion drainage, possible pleurodesis and PleurX placement (08 Jan 2024 09:08)    INTERVAL EVENTS: NAEON     SUBJECTIVE:  Patient was seen and examined at bedside. Pt resting in bed, CT x2 in place, no complaints. denies pain, cough or SOB.    Review of systems: No fever, chills, dizziness, HA, Changes in vision, CP, dyspnea, nausea or vomiting, dysuria, changes in bowel movements, LE edema. Rest of 12 point Review of systems negative unless otherwise documented elsewhere in note.     Diet, DASH/TLC:   Sodium & Cholesterol Restricted (01-05-24 @ 23:16) [Active]      MEDICATIONS:  MEDICATIONS  (STANDING):  amLODIPine   Tablet 5 milliGRAM(s) Oral daily  atorvastatin 10 milliGRAM(s) Oral at bedtime  bisacodyl 5 milliGRAM(s) Oral at bedtime  ethambutol 1200 milliGRAM(s) Oral daily  famotidine    Tablet 10 milliGRAM(s) Oral daily  heparin   Injectable 5000 Unit(s) SubCutaneous every 8 hours  isoniazid 300 milliGRAM(s) Oral daily  metoprolol succinate ER 25 milliGRAM(s) Oral daily  polyethylene glycol 3350 17 Gram(s) Oral daily  pyrazinamide 1500 milliGRAM(s) Oral daily  pyridoxine 50 milliGRAM(s) Oral daily  rifAMPin 600 milliGRAM(s) Oral daily  senna 2 Tablet(s) Oral at bedtime    MEDICATIONS  (PRN):  acetaminophen     Tablet .. 975 milliGRAM(s) Oral every 6 hours PRN Mild Pain (1 - 3)  oxyCODONE    IR 5 milliGRAM(s) Oral every 6 hours PRN Moderate Pain (4 - 6)      Allergies    No Known Allergies    Intolerances        OBJECTIVE:  Vital Signs Last 24 Hrs  T(C): 36.4 (08 Jan 2024 09:44), Max: 36.6 (07 Jan 2024 20:53)  T(F): 97.6 (08 Jan 2024 09:44), Max: 97.8 (07 Jan 2024 20:53)  HR: 98 (08 Jan 2024 08:42) (98 - 100)  BP: 162/79 (08 Jan 2024 08:42) (162/79 - 168/78)  BP(mean): 113 (08 Jan 2024 08:42) (112 - 120)  RR: 18 (08 Jan 2024 08:42) (15 - 18)  SpO2: 99% (08 Jan 2024 08:42) (96% - 99%)    Parameters below as of 08 Jan 2024 08:42  Patient On (Oxygen Delivery Method): room air      I&O's Summary    07 Jan 2024 07:01  -  08 Jan 2024 07:00  --------------------------------------------------------  IN: 915 mL / OUT: 460 mL / NET: 455 mL    08 Jan 2024 07:01  -  08 Jan 2024 09:45  --------------------------------------------------------  IN: 250 mL / OUT: 170 mL / NET: 80 mL        PHYSICAL EXAM:  Gen: Reclining in bed at time of exam, appears stated age  HEENT: NCAT, MMM, clear OP  Neck: supple, trachea at midline  CV: RRR, +S1/S2  Pulm: adequate respiratory effort, no increase in work of breathing, CT x2 in place   Abd: soft, NTND  Skin: warm and dry,   Ext: WWP, no LE edema  Neuro: AOx3, no gross focal neurological deficits  Psych: affect and behavior appropriate, pleasant at time of interview  :     LABS:                        10.0   13.80 )-----------( 404      ( 08 Jan 2024 05:30 )             30.8     01-08    142  |  111<H>  |  34<H>  ----------------------------<  104<H>  4.0   |  18<L>  |  2.23<H>    Ca    11.1<H>      08 Jan 2024 05:30  Mg     2.1     01-08    TPro  8.1  /  Alb  3.3  /  TBili  0.7  /  DBili  x   /  AST  15  /  ALT  <5<L>  /  AlkPhos  79  01-08    LIVER FUNCTIONS - ( 08 Jan 2024 05:30 )  Alb: 3.3 g/dL / Pro: 8.1 g/dL / ALK PHOS: 79 U/L / ALT: <5 U/L / AST: 15 U/L / GGT: x             CAPILLARY BLOOD GLUCOSE        Urinalysis Basic - ( 08 Jan 2024 05:30 )    Color: x / Appearance: x / SG: x / pH: x  Gluc: 104 mg/dL / Ketone: x  / Bili: x / Urobili: x   Blood: x / Protein: x / Nitrite: x   Leuk Esterase: x / RBC: x / WBC x   Sq Epi: x / Non Sq Epi: x / Bacteria: x        MICRODATA:    Culture - Acid Fast - Sputum w/Smear (collected 06 Jan 2024 08:52)  Source: .Sputum Sputum        RADIOLOGY/OTHER STUDIES:    PCP  Pharmacy:   Emergency contact:

## 2024-01-09 LAB
ANION GAP SERPL CALC-SCNC: 11 MMOL/L — SIGNIFICANT CHANGE UP (ref 5–17)
ANION GAP SERPL CALC-SCNC: 11 MMOL/L — SIGNIFICANT CHANGE UP (ref 5–17)
APTT BLD: 35.7 SEC — HIGH (ref 24.5–35.6)
APTT BLD: 35.7 SEC — HIGH (ref 24.5–35.6)
AUTO DIFF PNL BLD: NEGATIVE — SIGNIFICANT CHANGE UP
AUTO DIFF PNL BLD: NEGATIVE — SIGNIFICANT CHANGE UP
BUN SERPL-MCNC: 32 MG/DL — HIGH (ref 7–23)
BUN SERPL-MCNC: 32 MG/DL — HIGH (ref 7–23)
C-ANCA SER-ACNC: NEGATIVE — SIGNIFICANT CHANGE UP
C-ANCA SER-ACNC: NEGATIVE — SIGNIFICANT CHANGE UP
CALCIUM SERPL-MCNC: 10.8 MG/DL — HIGH (ref 8.4–10.5)
CALCIUM SERPL-MCNC: 10.8 MG/DL — HIGH (ref 8.4–10.5)
CALCIUM SERPL-MCNC: 10.9 MG/DL — HIGH (ref 8.4–10.5)
CALCIUM SERPL-MCNC: 10.9 MG/DL — HIGH (ref 8.4–10.5)
CALCIUM UR-MCNC: 16.1 MG/DL — SIGNIFICANT CHANGE UP
CALCIUM UR-MCNC: 16.1 MG/DL — SIGNIFICANT CHANGE UP
CHLORIDE SERPL-SCNC: 108 MMOL/L — SIGNIFICANT CHANGE UP (ref 96–108)
CHLORIDE SERPL-SCNC: 108 MMOL/L — SIGNIFICANT CHANGE UP (ref 96–108)
CO2 SERPL-SCNC: 18 MMOL/L — LOW (ref 22–31)
CO2 SERPL-SCNC: 18 MMOL/L — LOW (ref 22–31)
CREAT SERPL-MCNC: 2.05 MG/DL — HIGH (ref 0.5–1.3)
CREAT SERPL-MCNC: 2.05 MG/DL — HIGH (ref 0.5–1.3)
CULTURE RESULTS: NO GROWTH — SIGNIFICANT CHANGE UP
DEPRECATED M TB RPOB XXX QL NAA+PROBE: SIGNIFICANT CHANGE UP
EGFR: 34 ML/MIN/1.73M2 — LOW
EGFR: 34 ML/MIN/1.73M2 — LOW
GLUCOSE SERPL-MCNC: 100 MG/DL — HIGH (ref 70–99)
GLUCOSE SERPL-MCNC: 100 MG/DL — HIGH (ref 70–99)
H CAPSUL AG SPEC-ACNC: SIGNIFICANT CHANGE UP
H CAPSUL AG SPEC-ACNC: SIGNIFICANT CHANGE UP
H CAPSUL AG UR IA-ACNC: SIGNIFICANT CHANGE UP NG/ML
H CAPSUL AG UR IA-ACNC: SIGNIFICANT CHANGE UP NG/ML
H CAPSUL AG UR QL IA: SIGNIFICANT CHANGE UP
H CAPSUL AG UR QL IA: SIGNIFICANT CHANGE UP
HCT VFR BLD CALC: 29 % — LOW (ref 39–50)
HCT VFR BLD CALC: 29 % — LOW (ref 39–50)
HGB BLD-MCNC: 9.6 G/DL — LOW (ref 13–17)
HGB BLD-MCNC: 9.6 G/DL — LOW (ref 13–17)
INR BLD: 1.08 — SIGNIFICANT CHANGE UP (ref 0.85–1.18)
INR BLD: 1.08 — SIGNIFICANT CHANGE UP (ref 0.85–1.18)
M TB CMPLX DNA SPT/BRO QL NAA+PROBE: SIGNIFICANT CHANGE UP
MAGNESIUM SERPL-MCNC: 2 MG/DL — SIGNIFICANT CHANGE UP (ref 1.6–2.6)
MAGNESIUM SERPL-MCNC: 2 MG/DL — SIGNIFICANT CHANGE UP (ref 1.6–2.6)
MCHC RBC-ENTMCNC: 29.4 PG — SIGNIFICANT CHANGE UP (ref 27–34)
MCHC RBC-ENTMCNC: 29.4 PG — SIGNIFICANT CHANGE UP (ref 27–34)
MCHC RBC-ENTMCNC: 33.1 GM/DL — SIGNIFICANT CHANGE UP (ref 32–36)
MCHC RBC-ENTMCNC: 33.1 GM/DL — SIGNIFICANT CHANGE UP (ref 32–36)
MCV RBC AUTO: 88.7 FL — SIGNIFICANT CHANGE UP (ref 80–100)
MCV RBC AUTO: 88.7 FL — SIGNIFICANT CHANGE UP (ref 80–100)
MPO AB + PR3 PNL SER: SIGNIFICANT CHANGE UP
MPO AB + PR3 PNL SER: SIGNIFICANT CHANGE UP
MTB RIF RES GENE SPT NAA+PROBE: DETECTED
MTB RIF RES GENE SPT NAA+PROBE: DETECTED
MTB RIF RES GENE SPT NAA+PROBE: SIGNIFICANT CHANGE UP
MTB RIF RES GENE SPT NAA+PROBE: SIGNIFICANT CHANGE UP
NIGHT BLUE STAIN TISS: SIGNIFICANT CHANGE UP
NIGHT BLUE STAIN TISS: SIGNIFICANT CHANGE UP
NON-GYNECOLOGICAL CYTOLOGY STUDY: SIGNIFICANT CHANGE UP
NON-GYNECOLOGICAL CYTOLOGY STUDY: SIGNIFICANT CHANGE UP
NRBC # BLD: 0 /100 WBCS — SIGNIFICANT CHANGE UP (ref 0–0)
NRBC # BLD: 0 /100 WBCS — SIGNIFICANT CHANGE UP (ref 0–0)
P-ANCA SER-ACNC: NEGATIVE — SIGNIFICANT CHANGE UP
P-ANCA SER-ACNC: NEGATIVE — SIGNIFICANT CHANGE UP
PLATELET # BLD AUTO: 335 K/UL — SIGNIFICANT CHANGE UP (ref 150–400)
PLATELET # BLD AUTO: 335 K/UL — SIGNIFICANT CHANGE UP (ref 150–400)
POTASSIUM SERPL-MCNC: 4 MMOL/L — SIGNIFICANT CHANGE UP (ref 3.5–5.3)
POTASSIUM SERPL-MCNC: 4 MMOL/L — SIGNIFICANT CHANGE UP (ref 3.5–5.3)
POTASSIUM SERPL-SCNC: 4 MMOL/L — SIGNIFICANT CHANGE UP (ref 3.5–5.3)
POTASSIUM SERPL-SCNC: 4 MMOL/L — SIGNIFICANT CHANGE UP (ref 3.5–5.3)
PROT ?TM UR-MCNC: 38 MG/DL — HIGH (ref 0–12)
PROT ?TM UR-MCNC: 38 MG/DL — HIGH (ref 0–12)
PROTHROM AB SERPL-ACNC: 12.3 SEC — SIGNIFICANT CHANGE UP (ref 9.5–13)
PROTHROM AB SERPL-ACNC: 12.3 SEC — SIGNIFICANT CHANGE UP (ref 9.5–13)
PTH-INTACT FLD-MCNC: 6 PG/ML — LOW (ref 15–65)
PTH-INTACT FLD-MCNC: 6 PG/ML — LOW (ref 15–65)
RBC # BLD: 3.27 M/UL — LOW (ref 4.2–5.8)
RBC # BLD: 3.27 M/UL — LOW (ref 4.2–5.8)
RBC # FLD: 16.7 % — HIGH (ref 10.3–14.5)
RBC # FLD: 16.7 % — HIGH (ref 10.3–14.5)
RIFAMPIN PCR INTERP: SIGNIFICANT CHANGE UP
RIFAMPIN PCR INTERP: SIGNIFICANT CHANGE UP
SODIUM SERPL-SCNC: 137 MMOL/L — SIGNIFICANT CHANGE UP (ref 135–145)
SODIUM SERPL-SCNC: 137 MMOL/L — SIGNIFICANT CHANGE UP (ref 135–145)
SPECIMEN SOURCE: SIGNIFICANT CHANGE UP
VIT D25+D1,25 OH+D1,25 PNL SERPL-MCNC: 44 PG/ML — SIGNIFICANT CHANGE UP (ref 19.9–79.3)
VIT D25+D1,25 OH+D1,25 PNL SERPL-MCNC: 44 PG/ML — SIGNIFICANT CHANGE UP (ref 19.9–79.3)
WBC # BLD: 13.57 K/UL — HIGH (ref 3.8–10.5)
WBC # BLD: 13.57 K/UL — HIGH (ref 3.8–10.5)
WBC # FLD AUTO: 13.57 K/UL — HIGH (ref 3.8–10.5)
WBC # FLD AUTO: 13.57 K/UL — HIGH (ref 3.8–10.5)

## 2024-01-09 PROCEDURE — 99233 SBSQ HOSP IP/OBS HIGH 50: CPT

## 2024-01-09 PROCEDURE — 71045 X-RAY EXAM CHEST 1 VIEW: CPT | Mod: 26

## 2024-01-09 PROCEDURE — 99232 SBSQ HOSP IP/OBS MODERATE 35: CPT | Mod: 24

## 2024-01-09 RX ORDER — LABETALOL HCL 100 MG
100 TABLET ORAL THREE TIMES A DAY
Refills: 0 | Status: DISCONTINUED | OUTPATIENT
Start: 2024-01-09 | End: 2024-01-14

## 2024-01-09 RX ADMIN — FAMOTIDINE 10 MILLIGRAM(S): 10 INJECTION INTRAVENOUS at 11:41

## 2024-01-09 RX ADMIN — Medication 50 MILLIGRAM(S): at 11:41

## 2024-01-09 RX ADMIN — Medication 975 MILLIGRAM(S): at 23:29

## 2024-01-09 RX ADMIN — Medication 100 MILLIGRAM(S): at 17:55

## 2024-01-09 RX ADMIN — Medication 975 MILLIGRAM(S): at 08:17

## 2024-01-09 RX ADMIN — Medication 25 MILLIGRAM(S): at 13:46

## 2024-01-09 RX ADMIN — Medication 300 MILLIGRAM(S): at 11:41

## 2024-01-09 RX ADMIN — Medication 975 MILLIGRAM(S): at 22:24

## 2024-01-09 RX ADMIN — HEPARIN SODIUM 5000 UNIT(S): 5000 INJECTION INTRAVENOUS; SUBCUTANEOUS at 13:46

## 2024-01-09 RX ADMIN — Medication 25 MILLIGRAM(S): at 07:17

## 2024-01-09 RX ADMIN — ATORVASTATIN CALCIUM 10 MILLIGRAM(S): 80 TABLET, FILM COATED ORAL at 22:26

## 2024-01-09 RX ADMIN — SENNA PLUS 2 TABLET(S): 8.6 TABLET ORAL at 22:25

## 2024-01-09 RX ADMIN — HEPARIN SODIUM 5000 UNIT(S): 5000 INJECTION INTRAVENOUS; SUBCUTANEOUS at 07:18

## 2024-01-09 RX ADMIN — Medication 975 MILLIGRAM(S): at 09:15

## 2024-01-09 RX ADMIN — HEPARIN SODIUM 5000 UNIT(S): 5000 INJECTION INTRAVENOUS; SUBCUTANEOUS at 22:24

## 2024-01-09 RX ADMIN — Medication 975 MILLIGRAM(S): at 00:00

## 2024-01-09 RX ADMIN — Medication 975 MILLIGRAM(S): at 03:15

## 2024-01-09 NOTE — PROGRESS NOTE ADULT - SUBJECTIVE AND OBJECTIVE BOX
Patient is a 73y Male seen and evaluated at bedside. No acute distress, no complaints offered   Confirmed + for TB   BP with some improvement with Hydralazine 50mg TID, still not optimized   Kidney function continues to improve       Meds:    acetaminophen     Tablet .. 975 every 6 hours PRN  atorvastatin 10 at bedtime  bisacodyl 5 at bedtime  ethambutol 1200 <User Schedule>  famotidine    Tablet 10 daily  heparin   Injectable 5000 every 8 hours  hydrALAZINE 25 every 8 hours  isoniazid 300 daily  labetalol 100 three times a day  oxyCODONE    IR 5 every 6 hours PRN  polyethylene glycol 3350 17 daily  pyrazinamide 1500 <User Schedule>  pyridoxine 50 daily  rifAMPin 600 daily  senna 2 at bedtime      T(C): , Max: 36.8 (01-09-24 @ 08:52)  T(F): , Max: 98.3 (01-09-24 @ 08:52)  HR: 98 (01-09-24 @ 13:45)  BP: 156/78 (01-09-24 @ 13:45)  BP(mean): 109 (01-09-24 @ 13:45)  RR: 16 (01-09-24 @ 13:45)  SpO2: 98% (01-09-24 @ 13:45)  Wt(kg): --    01-08 @ 07:01  -  01-09 @ 07:00  --------------------------------------------------------  IN: 1130 mL / OUT: 610 mL / NET: 520 mL    01-09 @ 07:01  -  01-09 @ 14:15  --------------------------------------------------------  IN: 300 mL / OUT: 20 mL / NET: 280 mL          Review of Systems:  ROS negative except as per HPI      PHYSICAL EXAM:  GENERAL: NAD, sitting in bed   NECK: supple, No JVD  CHEST/LUNG: Clear to auscultation bilaterally, decreased breath sounds, chest tubes in place draining  serosang output  HEART: normal S1S2, RRR  ABDOMEN: Soft, Nontender   EXTREMITIES: No clubbing, cyanosis, or edema   NEUROLOGY: AAO x3, no focal neurological deficit    LABS:                        9.6    13.57 )-----------( 335      ( 09 Jan 2024 05:30 )             29.0     01-09    137  |  108  |  32<H>  ----------------------------<  100<H>  4.0   |  18<L>  |  2.05<H>    Ca    10.9<H>      09 Jan 2024 05:30  Mg     2.0     01-09    TPro  8.1  /  Alb  3.3  /  TBili  0.7  /  DBili  x   /  AST  15  /  ALT  <5<L>  /  AlkPhos  79  01-08      PT/INR - ( 09 Jan 2024 05:30 )   PT: 12.3 sec;   INR: 1.08          PTT - ( 09 Jan 2024 05:30 )  PTT:35.7 sec  Urinalysis Basic - ( 09 Jan 2024 05:30 )    Color: x / Appearance: x / SG: x / pH: x  Gluc: 100 mg/dL / Ketone: x  / Bili: x / Urobili: x   Blood: x / Protein: x / Nitrite: x   Leuk Esterase: x / RBC: x / WBC x   Sq Epi: x / Non Sq Epi: x / Bacteria: x      Calcium, Random Urine: 16.1 mg/dL (01-08 @ 17:39)        RADIOLOGY & ADDITIONAL STUDIES:

## 2024-01-09 NOTE — PROGRESS NOTE ADULT - SUBJECTIVE AND OBJECTIVE BOX
INCOMPLETE INFECTIOUS DISEASES CONSULT FOLLOW-UP NOTE    INTERVAL HPI/OVERNIGHT EVENTS:      ROS:   Constitutional, eyes, ENT, cardiovascular, respiratory, gastrointestinal, genitourinary, integumentary, neurological, psychiatric and heme/lymph are otherwise negative other than noted above       ANTIBIOTICS/RELEVANT:    MEDICATIONS  (STANDING):  atorvastatin 10 milliGRAM(s) Oral at bedtime  bisacodyl 5 milliGRAM(s) Oral at bedtime  ethambutol 1200 milliGRAM(s) Oral <User Schedule>  famotidine    Tablet 10 milliGRAM(s) Oral daily  heparin   Injectable 5000 Unit(s) SubCutaneous every 8 hours  hydrALAZINE 25 milliGRAM(s) Oral every 8 hours  isoniazid 300 milliGRAM(s) Oral daily  polyethylene glycol 3350 17 Gram(s) Oral daily  pyrazinamide 1500 milliGRAM(s) Oral <User Schedule>  pyridoxine 50 milliGRAM(s) Oral daily  rifAMPin 600 milliGRAM(s) Oral daily  senna 2 Tablet(s) Oral at bedtime    MEDICATIONS  (PRN):  acetaminophen     Tablet .. 975 milliGRAM(s) Oral every 6 hours PRN Mild Pain (1 - 3)  oxyCODONE    IR 5 milliGRAM(s) Oral every 6 hours PRN Moderate Pain (4 - 6)        Vital Signs Last 24 Hrs  T(C): 36.8 (09 Jan 2024 08:52), Max: 36.8 (09 Jan 2024 08:52)  T(F): 98.3 (09 Jan 2024 08:52), Max: 98.3 (09 Jan 2024 08:52)  HR: 100 (09 Jan 2024 07:20) (88 - 100)  BP: 149/69 (09 Jan 2024 07:20) (141/79 - 170/86)  BP(mean): 99 (09 Jan 2024 07:20) (99 - 122)  RR: 20 (09 Jan 2024 07:20) (16 - 20)  SpO2: 96% (09 Jan 2024 07:20) (96% - 99%)    Parameters below as of 09 Jan 2024 07:20  Patient On (Oxygen Delivery Method): room air        01-08-24 @ 07:01  -  01-09-24 @ 07:00  --------------------------------------------------------  IN: 1130 mL / OUT: 610 mL / NET: 520 mL    01-09-24 @ 07:01  -  01-09-24 @ 09:03  --------------------------------------------------------  IN: 0 mL / OUT: 0 mL / NET: 0 mL      PHYSICAL EXAM:  Constitutional: alert, NAD  Eyes: the sclera and conjunctiva were normal.   ENT: the ears and nose were normal in appearance.   Neck: the appearance of the neck was normal and the neck was supple.   Pulmonary: no respiratory distress and lungs were clear to auscultation bilaterally.   Heart: heart rate was normal and rhythm regular, normal S1 and S2  Vascular:. there was no peripheral edema  Abdomen: normal bowel sounds, soft, non-tender  Neurological: no focal deficits.   Psychiatric: the affect was normal        LABS:                        9.6    13.57 )-----------( 335      ( 09 Jan 2024 05:30 )             29.0     01-09    137  |  108  |  32<H>  ----------------------------<  100<H>  4.0   |  18<L>  |  2.05<H>    Ca    10.9<H>      09 Jan 2024 05:30  Mg     2.0     01-09    TPro  8.1  /  Alb  3.3  /  TBili  0.7  /  DBili  x   /  AST  15  /  ALT  <5<L>  /  AlkPhos  79  01-08    PT/INR - ( 09 Jan 2024 05:30 )   PT: 12.3 sec;   INR: 1.08          PTT - ( 09 Jan 2024 05:30 )  PTT:35.7 sec  Urinalysis Basic - ( 09 Jan 2024 05:30 )    Color: x / Appearance: x / SG: x / pH: x  Gluc: 100 mg/dL / Ketone: x  / Bili: x / Urobili: x   Blood: x / Protein: x / Nitrite: x   Leuk Esterase: x / RBC: x / WBC x   Sq Epi: x / Non Sq Epi: x / Bacteria: x        MICROBIOLOGY:      RADIOLOGY & ADDITIONAL STUDIES:  Reviewed

## 2024-01-09 NOTE — PROGRESS NOTE ADULT - ASSESSMENT
73Y M w/ hx of HTN s/p R VATS procedure for pleural biopsy decortication with pathology showing necrotizing granuloma + for TB   H/c further c/b nonoliguric  prerenal/ATN underlying CKD (baseline sCr 1.9), now at 2 with gradual improvement in BP, pending further hypercalcemic w/u       Nonoliguric ATN on underlying CKD (baseline sCr 1.9)   Now at 2 likely hemodynamic related given elevated BP   UA bland with low grade proteinuria 0.3g   Pending NAJMA, MPO, PR3, C-ANCA, P-ANCA given necrotizing granuloma and paraproteinemia w/u     Plan:  Encourage PO intake   Can also start on BIcarb 650TID   Daily BMP   Hold ACE/ARBS and diuretics for now   Maintain SBP >65 for renal perfusion   Avoid bouts of hypotension   Strict ins and outs   Abx dose based on eGFR <30  Follow up Serologies       HTN:  Likely due to Rifampin interaction as it decreased efficacy of Amlodipine and Metoprolol   C/w  hydralazine 25mg TID   Start on Labetalol 100mg BID   Once sCr stable and at baseline plan to initiate ACE/ARB for optimal control as well as proteinuria

## 2024-01-09 NOTE — PROGRESS NOTE ADULT - SUBJECTIVE AND OBJECTIVE BOX
INTERVAL HPI/OVERNIGHT EVENTS:    Patient was seen and examined at bedside.  No events.      CONSTITUTIONAL:  Negative fever or chills, feels well, good appetite  EYES:  Negative  blurry vision or double vision  CARDIOVASCULAR:  Negative for chest pain or palpitations  RESPIRATORY:  Negative for cough, wheezing, or SOB   GASTROINTESTINAL:  Negative for nausea, vomiting, diarrhea, constipation, or abdominal pain  GENITOURINARY:  Negative frequency, urgency or dysuria  NEUROLOGIC:  No headache, confusion, dizziness, lightheadedness      ANTIBIOTICS/RELEVANT:    MEDICATIONS  (STANDING):  atorvastatin 10 milliGRAM(s) Oral at bedtime  bisacodyl 5 milliGRAM(s) Oral at bedtime  ethambutol 1200 milliGRAM(s) Oral <User Schedule>  famotidine    Tablet 10 milliGRAM(s) Oral daily  heparin   Injectable 5000 Unit(s) SubCutaneous every 8 hours  hydrALAZINE 25 milliGRAM(s) Oral every 8 hours  isoniazid 300 milliGRAM(s) Oral daily  polyethylene glycol 3350 17 Gram(s) Oral daily  pyrazinamide 1500 milliGRAM(s) Oral <User Schedule>  pyridoxine 50 milliGRAM(s) Oral daily  rifAMPin 600 milliGRAM(s) Oral daily  senna 2 Tablet(s) Oral at bedtime    MEDICATIONS  (PRN):  acetaminophen     Tablet .. 975 milliGRAM(s) Oral every 6 hours PRN Mild Pain (1 - 3)  oxyCODONE    IR 5 milliGRAM(s) Oral every 6 hours PRN Moderate Pain (4 - 6)        Vital Signs Last 24 Hrs  T(C): 36.8 (09 Jan 2024 08:52), Max: 36.8 (09 Jan 2024 08:52)  T(F): 98.3 (09 Jan 2024 08:52), Max: 98.3 (09 Jan 2024 08:52)  HR: 94 (09 Jan 2024 10:15) (88 - 100)  BP: 137/64 (09 Jan 2024 10:15) (137/64 - 170/86)  BP(mean): 92 (09 Jan 2024 10:15) (92 - 122)  RR: 18 (09 Jan 2024 10:15) (16 - 20)  SpO2: 97% (09 Jan 2024 10:15) (96% - 99%)    Parameters below as of 09 Jan 2024 10:15  Patient On (Oxygen Delivery Method): room air        PHYSICAL EXAM:  Constitutional: non-toxic, no distress  Eyes:LIZY, EOMI  Ear/Nose/Throat: no oral lesion, no sinus tenderness on percussion	  Neck:  supple  Respiratory: CTA jose  Cardiovascular: S1S2 RRR, no murmurs  Gastrointestinal:soft, (+) BS, no HSM  Extremities:no e/e/c  Vascular: DP Pulse:	right normal; left normal      LABS:                        9.6    13.57 )-----------( 335      ( 09 Jan 2024 05:30 )             29.0     01-09    137  |  108  |  32<H>  ----------------------------<  100<H>  4.0   |  18<L>  |  2.05<H>    Ca    10.9<H>      09 Jan 2024 05:30  Mg     2.0     01-09    TPro  8.1  /  Alb  3.3  /  TBili  0.7  /  DBili  x   /  AST  15  /  ALT  <5<L>  /  AlkPhos  79  01-08    PT/INR - ( 09 Jan 2024 05:30 )   PT: 12.3 sec;   INR: 1.08          PTT - ( 09 Jan 2024 05:30 )  PTT:35.7 sec  Urinalysis Basic - ( 09 Jan 2024 05:30 )    Color: x / Appearance: x / SG: x / pH: x  Gluc: 100 mg/dL / Ketone: x  / Bili: x / Urobili: x   Blood: x / Protein: x / Nitrite: x   Leuk Esterase: x / RBC: x / WBC x   Sq Epi: x / Non Sq Epi: x / Bacteria: x    Mycobacterium Tuberculosis Complex PCR and RIF Resistance (01.06.24 @ 08:52)    Mycobacterium Tuberculosis Complex Source: Sputum   Mycobacterium Tuberculosis Complex PCR: NotDetec: The Xpert MTB/RIF Assay (realtime PCR) does not differentiate between the  species of the MTB-complex (i.e., Mycobacterium tuberculosis, M. bovis,  etc.). In addition, culture must also be performed to confirm  susceptibility result and to determine if mycobacteria other than  tuberculosis complex (KEO) are present.  Due to the low prevalence of rifampin resistant TB in the [place]  (1.9%) and the implications of rifampin resistance for treatment, all  MTB-complex strains determined to be rifampin resistant must have the  presence of rifampin resistance confirmed by a reference laboratory.  A positive test does not necessarily indicate the presence of viable  organisms.  This assay is FDA cleared for sputum only.   Mycobacterium Tuberculosis Complex PCR-Interp: SeeComment This result does not rule out the presence of MTB in the sample due to  low number of MTB.Results must be confirmed by culture to determine if  mycobacteria other than tuberculosis complex (KEO) are present.   Rifampin PCR: Not Indicated    Mycobacterium Tuberculosis Complex PCR and RIF Resistance (01.05.24 @ 08:44)    Mycobacterium Tuberculosis Complex Source: RT PLEURA This specimen type is not FDA approved for testing. The clinical  significance of result should be considered in conjunction with the  overall clinical presentation of the patient. Results are not intended to  be used as the sole means for clinical diagnosis or patient management  decisions.   Mycobacterium Tuberculosis Complex PCR: Detected: The Xpert MTB/RIF Assay (realtime PCR) does not differentiate between the  species of the MTB-complex (i.e., Mycobacterium tuberculosis, M. bovis,  etc.). In addition, culture must also be performed to confirm  susceptibility result and to determine if mycobacteria other than  tuberculosis complex (KEO) are present.  Due to the low prevalence of rifampin resistant TB in the [place]  (1.9%) and the implications of rifampin resistance for treatment, all  MTB-complex strains determined to be rifampin resistant must have the  presence of rifampin resistance confirmed by a reference laboratory.  A positive test does not necessarily indicate the presence of viable  organisms.  This assay is FDA cleared for sputum only.   Rifampin PCR: NotDetec   Rifampin PCR Interp: SeeComment Mutation for Rifampin resistance absent. Results must be confirmed by  culture based susceptibility testing.        MICROBIOLOGY:    RADIOLOGY & ADDITIONAL STUDIES:

## 2024-01-09 NOTE — PROGRESS NOTE ADULT - ASSESSMENT
73 year old M, Cantonese speaking, never a smoker, with PMHx of HTN, HLD, who is referred by Dr. Edward Jensen for an initial evaluation of pleural effusion. Patient reports right lower quadrant pain since Aug 2023, along with poor appetite, unintentionally weight loss around 20lbs, fatigue, progressively worse hoarseness and dyspnea. He had Shingles right arm in Oct 2023, and was subsequently diagnosed with vocal cord paralysis. No personal history of TB infection. Pt underwent R VATS, pleural biopsy decortication, on 1/5 with Dr. Kaiser. Intraop path revealed necrotizing granuloma. Pt being worked up for TB, ID consulted. 2 CT left in place to suction with airleak. POD 1 awaiting third sputum culture to be sent, and will start TB treatment. CTx2 still with airleak and will remain to suction. POD 2 AFB sputum samples collected, awaiting results, started on abx TB regimen per ID. On POD 3, AFB came back negative, CT remain to suction for continued air leak but on POD#4 the OR cultures came back positive for TB. Epidemiology to contact the [facility] about plan going forward regarding isolation, discharge and treatment of close contacts.     Neuro: pain management   - Tylenol and Oxy PRN     CVS: history of HTN and HLD. Currently normotensive   - continue hydralazine 25mg TID. Stopped lopressor and Amlodpine due to interaction with Rifampin.   - Continue Lipitor 10mg Daily.     Respiratory: Saturating well on RA. + Tuberculosis.   - maintain isolation for TB   - Wean off O2 sat > 92%  - IS and ambulation  - Chest PT.     GI: Diet changed to Regular from DASH  - GI PPX   - Bowel regimen with Senna and Miralax     : BUN/ Creatinine. 32/2.05 prerenal/ATN underlying CKD (baseline sCr 1.9), initially with gradual improvement with IVF  - continue to trend Creatinine.   - Encourage PO intake  - monitor I/Os.  - follow up hypercalcemia labs.     Endo: FS well controlled.   - ISS     ID: + TB   - Follow up [facility] recs regarding isolation, discharge and treatment of close contacts  - ID following   - C/w  600mg rifampin qd, 300mg Isoniazid qd, 1200mg qd of ethambutol M/W/F, 1500mg M/W/F of pyrazinimide  - C/w pyridoxine to prevent adverse s/e from Isoniazid therapy, 50mg qd  - continue to monitor fever curve   - Follow up need for non Con CT chest.    Heme: DVT PPX   - SQH     Dispo plan: follow up [facility].     Irma Vieyra PA-C     73 year old M, Cantonese speaking, never a smoker, with PMHx of HTN, HLD, who is referred by Dr. Edward Jensen for an initial evaluation of pleural effusion. Patient reports right lower quadrant pain since Aug 2023, along with poor appetite, unintentionally weight loss around 20lbs, fatigue, progressively worse hoarseness and dyspnea. He had Shingles right arm in Oct 2023, and was subsequently diagnosed with vocal cord paralysis. No personal history of TB infection. Pt underwent R VATS, pleural biopsy decortication, on 1/5 with Dr. Kaiser. Intraop path revealed necrotizing granuloma. Pt being worked up for TB, ID consulted. 2 CT left in place to suction with airleak. POD 1 awaiting third sputum culture to be sent, and will start TB treatment. CTx2 still with airleak and will remain to suction. POD 2 AFB sputum samples collected, awaiting results, started on abx TB regimen per ID. On POD 3, AFB came back negative, CT remain to suction for continued air leak but on POD#4 the OR cultures came back positive for TB. Epidemiology to contact the [facility] about plan going forward regarding isolation, discharge and treatment of close contacts.     Neuro: pain management   - Tylenol and Oxy PRN     CVS: history of HTN and HLD. Currently normotensive   - continue hydralazine 25mg TID. Stopped lopressor and Amlodpine due to interaction with Rifampin.   - Continue Lipitor 10mg Daily.     Respiratory: Saturating well on RA. + Tuberculosis.   - maintain isolation for TB   - Wean off O2 sat > 92%  - IS and ambulation  - Chest PT.     GI: Diet changed to Regular from DASH  - GI PPX   - Bowel regimen with Senna and Miralax     : BUN/ Creatinine. 32/2.05 prerenal/ATN underlying CKD (baseline sCr 1.9), initially with gradual improvement with IVF  - continue to trend Creatinine.   - Encourage PO intake  - monitor I/Os.  - follow up hypercalcemia labs.     Endo: FS well controlled.   - ISS     ID: + TB   - Follow up [facility] recs regarding isolation, discharge and treatment of close contacts  - ID following   - C/w  600mg rifampin qd, 300mg Isoniazid qd, 1200mg qd of ethambutol M/W/F, 1500mg M/W/F of pyrazinimide  - C/w pyridoxine to prevent adverse s/e from Isoniazid therapy, 50mg qd  - continue to monitor fever curve   - Follow up need for non Con CT chest.    Heme: DVT PPX   - SQH     Dispo plan: follow up [facility].     Irma Vieyra PA-C

## 2024-01-09 NOTE — PROGRESS NOTE ADULT - ASSESSMENT
73 year old M, Cantonese speaking, never a smoker, with PMHx of HTN, HLD, CKD3,BPH,  Gout, hx Shingles right arm ( 10/2023), hx RLQ pain since ( 8/2023) along with poor appetite, unintentional weight loss around 20 lbs, and progressively worse hoarseness and dyspnea. recent EGD and colonoscopy unremarkable. Pt was referred by Heme Onc Dr. Edward Jensen for an initial evaluation of right pleural effusion. Pt underwent 	R VATS RA Pleural biopsy and decortication (Frozen + Necrotizing granuloma) 1/5/24  POD 4    - 9LA for HD monitoring   - airborne isolation to rule out TB  - ID f/u appreciated, Team 1 Dr. 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   - Subsequent Sputum AFBx2 negative ( 1/6 & 1/8), f/u 3rd AFB pending   - started on RIPE Rx +Vitamin B6 50mg po daily   - Continue Rifampin 600 mg PO daily  - Continue  mg PO daily  - Continue Ethambutol 1200 mg PO three times a week (M, W, F)  - Continue pyrazinamide 1500 mg PO three times a week (M, W, F)  -  Continue vitamin B6 50 mg PO daily  - Will need Op exam for baseline Op exam since on Ethambutol  - Contact [facility]  - R CT x2 /f/u CXR in am reviewed   - O2 supplement as needed   - Renal f/u appreciated, for NARA on CKD3 (baseline Cr 1.8) : S Cr baseline 1.91( 12/15/23)-> 2.71 ( 1/5)-> 2.52( 1/6)-> 2.13( 1/7)-> 2.23( 1/8)-> 2.05(1/9) , UA noted w. mild proteinuria  - check ANCA labs per Renal  - Hypercalcemia: could be related to MTB. f/u Hypercalcemia workupas delineated per Renal   -  trend BMP for S Cr and avoid nephrotoxic meds/NSAIDS/iv contrast dye   - postop iv abx ppx: ceFAZolin   IVPB 2000 milliGRAM(s) IV Intermittent every 8 hours x3 completed   - pain control   - acetaminophen     Tablet .. 975 milliGRAM(s) Oral every 6 hours PRN  - oxyCODONE    IR 5 milliGRAM(s) Oral every 6 hours PRN  - bowel regimen:   - bisacodyl 5 milliGRAM(s) Oral at bedtime  - polyethylene glycol 3350 17 Gram(s) Oral daily  - senna 2 Tablet(s) Oral at bedtime  - HTN:   - amLODIPine   Tablet 5 milliGRAM(s) Oral daily  - metoprolol succinate ER 25 milliGRAM(s) Oral daily  - HLD: atorvastatin 10 milliGRAM(s) Oral at bedtime  - GI ppx: pantoprazole    Tablet 40 milliGRAM(s) Oral before breakfast  - DVT ppx: heparin   Injectable 5000 Unit(s) SubCutaneous every 8 hours    Contacts:   Referring Heme Onc Dr Joanne Jensen     POC as d/w family and CTS PA    Dr.Thida Venegas covering 1/10-1/17/24           73 year old M, Cantonese speaking, never a smoker, with PMHx of HTN, HLD, CKD3,BPH,  Gout, hx Shingles right arm ( 10/2023), hx RLQ pain since ( 8/2023) along with poor appetite, unintentional weight loss around 20 lbs, and progressively worse hoarseness and dyspnea. recent EGD and colonoscopy unremarkable. Pt was referred by Heme Onc Dr. Edward Jensen for an initial evaluation of right pleural effusion. Pt underwent 	R VATS RA Pleural biopsy and decortication (Frozen + Necrotizing granuloma) 1/5/24  POD 4    - 9LA for HD monitoring   - airborne isolation to rule out TB  - ID f/u appreciated, Team 1 Dr. 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   - Subsequent Sputum AFBx2 negative ( 1/6 & 1/8), f/u 3rd AFB pending   - started on RIPE Rx +Vitamin B6 50mg po daily   - Continue Rifampin 600 mg PO daily  - Continue  mg PO daily  - Continue Ethambutol 1200 mg PO three times a week (M, W, F)  - Continue pyrazinamide 1500 mg PO three times a week (M, W, F)  -  Continue vitamin B6 50 mg PO daily  - Will need Op exam for baseline Op exam since on Ethambutol  - Contact [facility]  - R CT x2 /f/u CXR in am reviewed   - O2 supplement as needed   - Renal f/u appreciated, for NARA on CKD3 (baseline Cr 1.8) : S Cr baseline 1.91( 12/15/23)-> 2.71 ( 1/5)-> 2.52( 1/6)-> 2.13( 1/7)-> 2.23( 1/8)-> 2.05(1/9) , UA noted w. mild proteinuria  - check ANCA labs per Renal  - Hypercalcemia: could be related to MTB. f/u Hypercalcemia workupas delineated per Renal   -  trend BMP for S Cr and avoid nephrotoxic meds/NSAIDS/iv contrast dye   - postop iv abx ppx: ceFAZolin   IVPB 2000 milliGRAM(s) IV Intermittent every 8 hours x3 completed   - pain control   - acetaminophen     Tablet .. 975 milliGRAM(s) Oral every 6 hours PRN  - oxyCODONE    IR 5 milliGRAM(s) Oral every 6 hours PRN  - bowel regimen:   - bisacodyl 5 milliGRAM(s) Oral at bedtime  - polyethylene glycol 3350 17 Gram(s) Oral daily  - senna 2 Tablet(s) Oral at bedtime  - HTN:   - amLODIPine   Tablet 5 milliGRAM(s) Oral daily  - metoprolol succinate ER 25 milliGRAM(s) Oral daily  - HLD: atorvastatin 10 milliGRAM(s) Oral at bedtime  - GI ppx: pantoprazole    Tablet 40 milliGRAM(s) Oral before breakfast  - DVT ppx: heparin   Injectable 5000 Unit(s) SubCutaneous every 8 hours    Contacts:   Referring Heme Onc Dr Joanne Jensen     POC as d/w family and CTS PA    Dr.Thida Venegas covering 1/10-1/17/24

## 2024-01-09 NOTE — PROGRESS NOTE ADULT - SUBJECTIVE AND OBJECTIVE BOX
Patient discussed on morning rounds with Dr. Arthur     Operation / Date: 1/5: R VATS RA Pleural biopsy and Decortication  Frozen + Necrotizing granuloma       SUBJECTIVE ASSESSMENT:  73y Male reports that he feels well but that he wishes to change his diet to regular diet because he will refuse to eat on his current DASH diet. No other complaints at this time. Patient states that he remains asymptomatic of upper respiratory symptoms. Also denies dysuria when asked.         Vital Signs Last 24 Hrs  T(C): 36.8 (09 Jan 2024 08:52), Max: 36.8 (09 Jan 2024 08:52)  T(F): 98.3 (09 Jan 2024 08:52), Max: 98.3 (09 Jan 2024 08:52)  HR: 94 (09 Jan 2024 10:15) (88 - 100)  BP: 137/64 (09 Jan 2024 10:15) (137/64 - 170/86)  BP(mean): 92 (09 Jan 2024 10:15) (92 - 122)  RR: 18 (09 Jan 2024 10:15) (16 - 20)  SpO2: 97% (09 Jan 2024 10:15) (96% - 99%)    Parameters below as of 09 Jan 2024 10:15  Patient On (Oxygen Delivery Method): room air      I&O's Detail    08 Jan 2024 07:01  -  09 Jan 2024 07:00  --------------------------------------------------------  IN:    Oral Fluid: 1130 mL  Total IN: 1130 mL    OUT:    Chest Tube (mL): 15 mL    Chest Tube (mL): 95 mL    Voided (mL): 500 mL  Total OUT: 610 mL    Total NET: 520 mL      09 Jan 2024 07:01  -  09 Jan 2024 11:58  --------------------------------------------------------  IN:    Oral Fluid: 100 mL  Total IN: 100 mL    OUT:    Chest Tube (mL): 5 mL    Chest Tube (mL): 15 mL  Total OUT: 20 mL    Total NET: 80 mL          CHEST TUBE:  Yes/AIR LEAKS: No on H2O SEAL.   DEON DRAIN:  No.  EPICARDIAL WIRES: No.  TIE DOWNS: No.  TRUJILLO: No.    PHYSICAL EXAM:  GEN: NAD, looks comfortable  Psych: Mood appropriate  Neuro: A&Ox3.  No focal deficits.  Moving all extremities.   HEENT: No obvious abnormalities  CV: S1S2, regular, no murmurs appreciated.  No carotid bruits.  No JVD  Lungs: Clear B/L.  No wheezing, rales or rhonchi  ABD: Soft, non-tender, non-distended.  +Bowel sounds  EXT: Warm and well perfused.  No peripheral edema noted  Musculoskeletal: Moving all extremities with normal ROM, no joint swelling  PV: Pedal pulses palpable  Incisions: thorascoopic incisions are c/d/i without erythema or drainage. Chest tube dressing c/d/i      LABS:                        9.6    13.57 )-----------( 335      ( 09 Jan 2024 05:30 )             29.0       PT/INR - ( 09 Jan 2024 05:30 )   PT: 12.3 sec;   INR: 1.08          PTT - ( 09 Jan 2024 05:30 )  PTT:35.7 sec    01-09    137  |  108  |  32<H>  ----------------------------<  100<H>  4.0   |  18<L>  |  2.05<H>    Ca    10.9<H>      09 Jan 2024 05:30  Mg     2.0     01-09    TPro  8.1  /  Alb  3.3  /  TBili  0.7  /  DBili  x   /  AST  15  /  ALT  <5<L>  /  AlkPhos  79  01-08      Urinalysis Basic - ( 09 Jan 2024 05:30 )    Color: x / Appearance: x / SG: x / pH: x  Gluc: 100 mg/dL / Ketone: x  / Bili: x / Urobili: x   Blood: x / Protein: x / Nitrite: x   Leuk Esterase: x / RBC: x / WBC x   Sq Epi: x / Non Sq Epi: x / Bacteria: x        MEDICATIONS  (STANDING):  atorvastatin 10 milliGRAM(s) Oral at bedtime  bisacodyl 5 milliGRAM(s) Oral at bedtime  ethambutol 1200 milliGRAM(s) Oral <User Schedule>  famotidine    Tablet 10 milliGRAM(s) Oral daily  heparin   Injectable 5000 Unit(s) SubCutaneous every 8 hours  hydrALAZINE 25 milliGRAM(s) Oral every 8 hours  isoniazid 300 milliGRAM(s) Oral daily  polyethylene glycol 3350 17 Gram(s) Oral daily  pyrazinamide 1500 milliGRAM(s) Oral <User Schedule>  pyridoxine 50 milliGRAM(s) Oral daily  rifAMPin 600 milliGRAM(s) Oral daily  senna 2 Tablet(s) Oral at bedtime    MEDICATIONS  (PRN):  acetaminophen     Tablet .. 975 milliGRAM(s) Oral every 6 hours PRN Mild Pain (1 - 3)  oxyCODONE    IR 5 milliGRAM(s) Oral every 6 hours PRN Moderate Pain (4 - 6)        RADIOLOGY & ADDITIONAL TESTS:

## 2024-01-09 NOTE — PROGRESS NOTE ADULT - SUBJECTIVE AND OBJECTIVE BOX
Patient is a 73y old  Male who presents with a chief complaint of elective surgery :  Right Video-Assisted Thoracoscopic Surgery, Robotic Assisted, Right pleural biopsy, Pleural effusion drainage, possible pleurodesis and PleurX placement (09 Jan 2024 12:11)    INTERVAL EVENTS: NAEON     SUBJECTIVE:  Patient was seen and examined at bedside. Wife at bedside. 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 Denies fever, chills, cough, SOB, pain,etc. CT x2 in place.     Review of systems: No fever, chills, dizziness, HA, Changes in vision, CP, dyspnea, nausea or vomiting, dysuria, changes in bowel movements, LE edema. Rest of 12 point Review of systems negative unless otherwise documented elsewhere in note.     Diet, Regular (01-09-24 @ 10:01) [Active]      MEDICATIONS:  MEDICATIONS  (STANDING):  atorvastatin 10 milliGRAM(s) Oral at bedtime  bisacodyl 5 milliGRAM(s) Oral at bedtime  ethambutol 1200 milliGRAM(s) Oral <User Schedule>  famotidine    Tablet 10 milliGRAM(s) Oral daily  heparin   Injectable 5000 Unit(s) SubCutaneous every 8 hours  hydrALAZINE 25 milliGRAM(s) Oral every 8 hours  isoniazid 300 milliGRAM(s) Oral daily  polyethylene glycol 3350 17 Gram(s) Oral daily  pyrazinamide 1500 milliGRAM(s) Oral <User Schedule>  pyridoxine 50 milliGRAM(s) Oral daily  rifAMPin 600 milliGRAM(s) Oral daily  senna 2 Tablet(s) Oral at bedtime    MEDICATIONS  (PRN):  acetaminophen     Tablet .. 975 milliGRAM(s) Oral every 6 hours PRN Mild Pain (1 - 3)  oxyCODONE    IR 5 milliGRAM(s) Oral every 6 hours PRN Moderate Pain (4 - 6)      Allergies    No Known Allergies    Intolerances        OBJECTIVE:  Vital Signs Last 24 Hrs  T(C): 36.8 (09 Jan 2024 08:52), Max: 36.8 (09 Jan 2024 08:52)  T(F): 98.3 (09 Jan 2024 08:52), Max: 98.3 (09 Jan 2024 08:52)  HR: 94 (09 Jan 2024 10:15) (88 - 100)  BP: 137/64 (09 Jan 2024 10:15) (137/64 - 170/86)  BP(mean): 92 (09 Jan 2024 10:15) (92 - 122)  RR: 18 (09 Jan 2024 10:15) (16 - 20)  SpO2: 97% (09 Jan 2024 10:15) (96% - 99%)    Parameters below as of 09 Jan 2024 10:15  Patient On (Oxygen Delivery Method): room air      I&O's Summary    08 Jan 2024 07:01  -  09 Jan 2024 07:00  --------------------------------------------------------  IN: 1130 mL / OUT: 610 mL / NET: 520 mL    09 Jan 2024 07:01  -  09 Jan 2024 12:42  --------------------------------------------------------  IN: 100 mL / OUT: 20 mL / NET: 80 mL        PHYSICAL EXAM:  Gen: Reclining in bed at time of exam, appears stated age  HEENT: NCAT, MMM, clear OP  Neck: supple, trachea at midline  CV: RRR, +S1/S2  Pulm: adequate respiratory effort, no increase in work of breathing, CT x2 in place   Abd: soft, NTND  Skin: warm and dry,   Ext: WWP, no LE edema  Neuro: AOx3, no gross focal neurological deficits  Psych: affect and behavior appropriate, pleasant at time of interview  :     LABS:                        9.6    13.57 )-----------( 335      ( 09 Jan 2024 05:30 )             29.0     01-09    137  |  108  |  32<H>  ----------------------------<  100<H>  4.0   |  18<L>  |  2.05<H>    Ca    10.9<H>      09 Jan 2024 05:30  Mg     2.0     01-09    TPro  8.1  /  Alb  3.3  /  TBili  0.7  /  DBili  x   /  AST  15  /  ALT  <5<L>  /  AlkPhos  79  01-08    LIVER FUNCTIONS - ( 08 Jan 2024 05:30 )  Alb: 3.3 g/dL / Pro: 8.1 g/dL / ALK PHOS: 79 U/L / ALT: <5 U/L / AST: 15 U/L / GGT: x           PT/INR - ( 09 Jan 2024 05:30 )   PT: 12.3 sec;   INR: 1.08          PTT - ( 09 Jan 2024 05:30 )  PTT:35.7 sec  CAPILLARY BLOOD GLUCOSE        Urinalysis Basic - ( 09 Jan 2024 05:30 )    Color: x / Appearance: x / SG: x / pH: x  Gluc: 100 mg/dL / Ketone: x  / Bili: x / Urobili: x   Blood: x / Protein: x / Nitrite: x   Leuk Esterase: x / RBC: x / WBC x   Sq Epi: x / Non Sq Epi: x / Bacteria: x        MICRODATA:    Culture - Acid Fast - Sputum w/Smear (collected 08 Jan 2024 10:45)  Source: .Sputum Sputum        RADIOLOGY/OTHER STUDIES:

## 2024-01-09 NOTE — PROGRESS NOTE ADULT - ASSESSMENT
IMPRESSION:  73 year old M, Cantonese speaking, never a smoker, with PMHx of HTN, HLD, who is referred by Dr. Edward Jensen for an initial evaluation of pleural effusion. Patient reports right lower quadrant pain since Aug 2023, along with poor appetite, unintentionally weight loss around 20lbs, fatigue, progressively worse hoarseness and dyspnea. He had Shingles right arm in Oct 2023, and was subsequently diagnosed with vocal cord paralysis. No personal history of TB infection. Pt underwent R VATS, pleural biopsy decortication, on 1/5 with Dr. Kaiser. Intraop path revealed necrotizing granuloma. ID was consulted for w/u of necrotizing granuloma, and concern for active TB.    MTB PCR from pleural tissue culture is now positive, confirming the diagnosis     Recommend:  1.  Continue Rifampin 600 mg PO daily  2.  Continue  mg PO daily  3.  Continue Ethambutol 1200 mg PO three times a week (M, W, F)  4.  Continue pyrazinamide 1500 mg PO three times a week (M, W, F)  5.  Continue vitamin B6 50 mg PO daily  6.  Continue airborne precautions   7.  Follow up AFB sputum cultures   8.  Patient reported to [facility] today    ID team 1 will follow     IMPRESSION:  73 year old M, Cantonese speaking, never a smoker, with PMHx of HTN, HLD, who is referred by Dr. Edward Jensen for an initial evaluation of pleural effusion. Patient reports right lower quadrant pain since Aug 2023, along with poor appetite, unintentionally weight loss around 20lbs, fatigue, progressively worse hoarseness and dyspnea. He had Shingles right arm in Oct 2023, and was subsequently diagnosed with vocal cord paralysis. No personal history of TB infection. Pt underwent R VATS, pleural biopsy decortication, on 1/5 with Dr. Kaiser. Intraop path revealed necrotizing granuloma. ID was consulted for w/u of necrotizing granuloma, and concern for active TB.    MTB PCR from pleural tissue culture is now positive, confirming the diagnosis     Recommend:  1.  Continue Rifampin 600 mg PO daily  2.  Continue  mg PO daily  3.  Continue Ethambutol 1200 mg PO three times a week (M, W, F)  4.  Continue pyrazinamide 1500 mg PO three times a week (M, W, F)  5.  Continue vitamin B6 50 mg PO daily  6.  Continue airborne precautions   7.  Follow up AFB sputum cultures   8.  Patient reported to [facility] today    ID team 1 will follow

## 2024-01-10 PROBLEM — J92.9 PLEURAL THICKENING: Status: ACTIVE | Noted: 2023-12-19

## 2024-01-10 PROBLEM — J90 PLEURAL EFFUSION ON RIGHT: Status: ACTIVE | Noted: 2023-12-14

## 2024-01-10 LAB
ALBUMIN SERPL ELPH-MCNC: 3.1 G/DL — LOW (ref 3.3–5)
ALBUMIN SERPL ELPH-MCNC: 3.1 G/DL — LOW (ref 3.3–5)
ALP SERPL-CCNC: 80 U/L — SIGNIFICANT CHANGE UP (ref 40–120)
ALP SERPL-CCNC: 80 U/L — SIGNIFICANT CHANGE UP (ref 40–120)
ALT FLD-CCNC: 31 U/L — SIGNIFICANT CHANGE UP (ref 10–45)
ALT FLD-CCNC: 31 U/L — SIGNIFICANT CHANGE UP (ref 10–45)
ANION GAP SERPL CALC-SCNC: 11 MMOL/L — SIGNIFICANT CHANGE UP (ref 5–17)
ANION GAP SERPL CALC-SCNC: 11 MMOL/L — SIGNIFICANT CHANGE UP (ref 5–17)
AST SERPL-CCNC: 51 U/L — HIGH (ref 10–40)
AST SERPL-CCNC: 51 U/L — HIGH (ref 10–40)
BASOPHILS # BLD AUTO: 0.07 K/UL — SIGNIFICANT CHANGE UP (ref 0–0.2)
BASOPHILS # BLD AUTO: 0.07 K/UL — SIGNIFICANT CHANGE UP (ref 0–0.2)
BASOPHILS NFR BLD AUTO: 0.6 % — SIGNIFICANT CHANGE UP (ref 0–2)
BASOPHILS NFR BLD AUTO: 0.6 % — SIGNIFICANT CHANGE UP (ref 0–2)
BILIRUB SERPL-MCNC: 0.5 MG/DL — SIGNIFICANT CHANGE UP (ref 0.2–1.2)
BILIRUB SERPL-MCNC: 0.5 MG/DL — SIGNIFICANT CHANGE UP (ref 0.2–1.2)
BUN SERPL-MCNC: 35 MG/DL — HIGH (ref 7–23)
BUN SERPL-MCNC: 35 MG/DL — HIGH (ref 7–23)
CALCIUM SERPL-MCNC: 10.9 MG/DL — HIGH (ref 8.4–10.5)
CALCIUM SERPL-MCNC: 10.9 MG/DL — HIGH (ref 8.4–10.5)
CHLORIDE SERPL-SCNC: 108 MMOL/L — SIGNIFICANT CHANGE UP (ref 96–108)
CHLORIDE SERPL-SCNC: 108 MMOL/L — SIGNIFICANT CHANGE UP (ref 96–108)
CO2 SERPL-SCNC: 17 MMOL/L — LOW (ref 22–31)
CO2 SERPL-SCNC: 17 MMOL/L — LOW (ref 22–31)
CREAT SERPL-MCNC: 2.16 MG/DL — HIGH (ref 0.5–1.3)
CREAT SERPL-MCNC: 2.16 MG/DL — HIGH (ref 0.5–1.3)
EGFR: 32 ML/MIN/1.73M2 — LOW
EGFR: 32 ML/MIN/1.73M2 — LOW
EOSINOPHIL # BLD AUTO: 1.69 K/UL — HIGH (ref 0–0.5)
EOSINOPHIL # BLD AUTO: 1.69 K/UL — HIGH (ref 0–0.5)
EOSINOPHIL NFR BLD AUTO: 13.3 % — HIGH (ref 0–6)
EOSINOPHIL NFR BLD AUTO: 13.3 % — HIGH (ref 0–6)
GLUCOSE SERPL-MCNC: 110 MG/DL — HIGH (ref 70–99)
GLUCOSE SERPL-MCNC: 110 MG/DL — HIGH (ref 70–99)
HCT VFR BLD CALC: 27.3 % — LOW (ref 39–50)
HCT VFR BLD CALC: 27.3 % — LOW (ref 39–50)
HGB BLD-MCNC: 9 G/DL — LOW (ref 13–17)
HGB BLD-MCNC: 9 G/DL — LOW (ref 13–17)
IMM GRANULOCYTES NFR BLD AUTO: 1.3 % — HIGH (ref 0–0.9)
IMM GRANULOCYTES NFR BLD AUTO: 1.3 % — HIGH (ref 0–0.9)
KAPPA LC SER QL IFE: 5.78 MG/DL — HIGH (ref 0.33–1.94)
KAPPA LC SER QL IFE: 5.78 MG/DL — HIGH (ref 0.33–1.94)
KAPPA/LAMBDA FREE LIGHT CHAIN RATIO, SERUM: 0.91 RATIO — SIGNIFICANT CHANGE UP (ref 0.26–1.65)
KAPPA/LAMBDA FREE LIGHT CHAIN RATIO, SERUM: 0.91 RATIO — SIGNIFICANT CHANGE UP (ref 0.26–1.65)
LAMBDA LC SER QL IFE: 6.32 MG/DL — HIGH (ref 0.57–2.63)
LAMBDA LC SER QL IFE: 6.32 MG/DL — HIGH (ref 0.57–2.63)
LYMPHOCYTES # BLD AUTO: 19.2 % — SIGNIFICANT CHANGE UP (ref 13–44)
LYMPHOCYTES # BLD AUTO: 19.2 % — SIGNIFICANT CHANGE UP (ref 13–44)
LYMPHOCYTES # BLD AUTO: 2.44 K/UL — SIGNIFICANT CHANGE UP (ref 1–3.3)
LYMPHOCYTES # BLD AUTO: 2.44 K/UL — SIGNIFICANT CHANGE UP (ref 1–3.3)
MAGNESIUM SERPL-MCNC: 2.1 MG/DL — SIGNIFICANT CHANGE UP (ref 1.6–2.6)
MAGNESIUM SERPL-MCNC: 2.1 MG/DL — SIGNIFICANT CHANGE UP (ref 1.6–2.6)
MCHC RBC-ENTMCNC: 29.5 PG — SIGNIFICANT CHANGE UP (ref 27–34)
MCHC RBC-ENTMCNC: 29.5 PG — SIGNIFICANT CHANGE UP (ref 27–34)
MCHC RBC-ENTMCNC: 33 GM/DL — SIGNIFICANT CHANGE UP (ref 32–36)
MCHC RBC-ENTMCNC: 33 GM/DL — SIGNIFICANT CHANGE UP (ref 32–36)
MCV RBC AUTO: 89.5 FL — SIGNIFICANT CHANGE UP (ref 80–100)
MCV RBC AUTO: 89.5 FL — SIGNIFICANT CHANGE UP (ref 80–100)
MONOCYTES # BLD AUTO: 1.22 K/UL — HIGH (ref 0–0.9)
MONOCYTES # BLD AUTO: 1.22 K/UL — HIGH (ref 0–0.9)
MONOCYTES NFR BLD AUTO: 9.6 % — SIGNIFICANT CHANGE UP (ref 2–14)
MONOCYTES NFR BLD AUTO: 9.6 % — SIGNIFICANT CHANGE UP (ref 2–14)
NEUTROPHILS # BLD AUTO: 7.09 K/UL — SIGNIFICANT CHANGE UP (ref 1.8–7.4)
NEUTROPHILS # BLD AUTO: 7.09 K/UL — SIGNIFICANT CHANGE UP (ref 1.8–7.4)
NEUTROPHILS NFR BLD AUTO: 56 % — SIGNIFICANT CHANGE UP (ref 43–77)
NEUTROPHILS NFR BLD AUTO: 56 % — SIGNIFICANT CHANGE UP (ref 43–77)
NRBC # BLD: 0 /100 WBCS — SIGNIFICANT CHANGE UP (ref 0–0)
NRBC # BLD: 0 /100 WBCS — SIGNIFICANT CHANGE UP (ref 0–0)
PHOSPHATE SERPL-MCNC: 3.5 MG/DL — SIGNIFICANT CHANGE UP (ref 2.5–4.5)
PHOSPHATE SERPL-MCNC: 3.5 MG/DL — SIGNIFICANT CHANGE UP (ref 2.5–4.5)
PLATELET # BLD AUTO: 353 K/UL — SIGNIFICANT CHANGE UP (ref 150–400)
PLATELET # BLD AUTO: 353 K/UL — SIGNIFICANT CHANGE UP (ref 150–400)
POTASSIUM SERPL-MCNC: 3.9 MMOL/L — SIGNIFICANT CHANGE UP (ref 3.5–5.3)
POTASSIUM SERPL-MCNC: 3.9 MMOL/L — SIGNIFICANT CHANGE UP (ref 3.5–5.3)
POTASSIUM SERPL-SCNC: 3.9 MMOL/L — SIGNIFICANT CHANGE UP (ref 3.5–5.3)
POTASSIUM SERPL-SCNC: 3.9 MMOL/L — SIGNIFICANT CHANGE UP (ref 3.5–5.3)
PROT SERPL-MCNC: 6.9 G/DL — SIGNIFICANT CHANGE UP (ref 6–8.3)
PROT SERPL-MCNC: 6.9 G/DL — SIGNIFICANT CHANGE UP (ref 6–8.3)
PROT SERPL-MCNC: 7.2 G/DL — SIGNIFICANT CHANGE UP (ref 6–8.3)
PROT SERPL-MCNC: 7.2 G/DL — SIGNIFICANT CHANGE UP (ref 6–8.3)
RBC # BLD: 3.05 M/UL — LOW (ref 4.2–5.8)
RBC # BLD: 3.05 M/UL — LOW (ref 4.2–5.8)
RBC # FLD: 17.2 % — HIGH (ref 10.3–14.5)
RBC # FLD: 17.2 % — HIGH (ref 10.3–14.5)
SODIUM SERPL-SCNC: 136 MMOL/L — SIGNIFICANT CHANGE UP (ref 135–145)
SODIUM SERPL-SCNC: 136 MMOL/L — SIGNIFICANT CHANGE UP (ref 135–145)
WBC # BLD: 12.68 K/UL — HIGH (ref 3.8–10.5)
WBC # BLD: 12.68 K/UL — HIGH (ref 3.8–10.5)
WBC # FLD AUTO: 12.68 K/UL — HIGH (ref 3.8–10.5)
WBC # FLD AUTO: 12.68 K/UL — HIGH (ref 3.8–10.5)

## 2024-01-10 PROCEDURE — 99233 SBSQ HOSP IP/OBS HIGH 50: CPT

## 2024-01-10 PROCEDURE — 99232 SBSQ HOSP IP/OBS MODERATE 35: CPT | Mod: 24

## 2024-01-10 PROCEDURE — 99232 SBSQ HOSP IP/OBS MODERATE 35: CPT

## 2024-01-10 PROCEDURE — 71045 X-RAY EXAM CHEST 1 VIEW: CPT | Mod: 26

## 2024-01-10 PROCEDURE — 71045 X-RAY EXAM CHEST 1 VIEW: CPT | Mod: 26,77

## 2024-01-10 RX ADMIN — Medication 100 MILLIGRAM(S): at 17:55

## 2024-01-10 RX ADMIN — Medication 975 MILLIGRAM(S): at 21:14

## 2024-01-10 RX ADMIN — Medication 300 MILLIGRAM(S): at 12:51

## 2024-01-10 RX ADMIN — SENNA PLUS 2 TABLET(S): 8.6 TABLET ORAL at 21:14

## 2024-01-10 RX ADMIN — HEPARIN SODIUM 5000 UNIT(S): 5000 INJECTION INTRAVENOUS; SUBCUTANEOUS at 21:15

## 2024-01-10 RX ADMIN — Medication 50 MILLIGRAM(S): at 12:52

## 2024-01-10 RX ADMIN — ATORVASTATIN CALCIUM 10 MILLIGRAM(S): 80 TABLET, FILM COATED ORAL at 21:14

## 2024-01-10 RX ADMIN — Medication 25 MILLIGRAM(S): at 15:26

## 2024-01-10 RX ADMIN — Medication 25 MILLIGRAM(S): at 05:41

## 2024-01-10 RX ADMIN — Medication 100 MILLIGRAM(S): at 09:44

## 2024-01-10 RX ADMIN — Medication 25 MILLIGRAM(S): at 00:04

## 2024-01-10 RX ADMIN — HEPARIN SODIUM 5000 UNIT(S): 5000 INJECTION INTRAVENOUS; SUBCUTANEOUS at 15:26

## 2024-01-10 RX ADMIN — ETHAMBUTOL HYDROCHLORIDE 1200 MILLIGRAM(S): 400 TABLET, FILM COATED ORAL at 05:39

## 2024-01-10 RX ADMIN — PYRAZINAMIDE 1500 MILLIGRAM(S): 500 TABLET ORAL at 05:38

## 2024-01-10 RX ADMIN — HEPARIN SODIUM 5000 UNIT(S): 5000 INJECTION INTRAVENOUS; SUBCUTANEOUS at 05:37

## 2024-01-10 RX ADMIN — FAMOTIDINE 10 MILLIGRAM(S): 10 INJECTION INTRAVENOUS at 12:51

## 2024-01-10 RX ADMIN — Medication 5 MILLIGRAM(S): at 21:14

## 2024-01-10 RX ADMIN — Medication 100 MILLIGRAM(S): at 05:42

## 2024-01-10 RX ADMIN — Medication 975 MILLIGRAM(S): at 22:00

## 2024-01-10 NOTE — PROGRESS NOTE ADULT - ASSESSMENT
73 year old M, Cantonese speaking, never a smoker, with PMHx of HTN, HLD, who is referred by Dr. Edward Jensen for an initial evaluation of pleural effusion. Patient reports right lower quadrant pain since Aug 2023, along with poor appetite, unintentionally weight loss around 20lbs, fatigue, progressively worse hoarseness and dyspnea. He had Shingles right arm in Oct 2023, and was subsequently diagnosed with vocal cord paralysis. No personal history of TB infection. Pt underwent R VATS, pleural biopsy decortication, on 1/5 with Dr. Kaiser. Intraop path revealed necrotizing granuloma. Pt being worked up for TB, ID consulted. 2 CT left in place to suction with airleak. POD 1 awaiting third sputum culture to be sent, and will start TB treatment. CTx2 still with airleak and will remain to suction. POD 2 AFB sputum samples collected, awaiting results, started on abx TB regimen per ID. On POD 3, AFB came back negative, CT remain to suction for continued air leak but on POD#4 the OR cultures came back positive for TB. Given that the sputum is negative and only the OR cultures are positive, the patient is likely not active and contagious. ID to contact the [facility] for permission to discharge the patient into the community.     Neuro: pain management   - Tylenol and Oxy PRN     CVS: history of HTN and HLD. Currently normotensive   - continue hydralazine 25mg TID. Stopped lopressor and Amlodpine due to interaction with Rifampin.   - Labetolol started for BP per renal due to Interaction of lopressor with rifampin.   - Continue Lipitor 10mg Daily.     Respiratory: Saturating well on RA. + Tuberculosis.   - maintain isolation for TB   - Wean off O2 sat > 92%  - IS and ambulation  - Chest PT.     GI: Diet changed to Regular from DASH  - GI PPX   - Bowel regimen with Senna and Miralax     : BUN/ Creatinine. 32/2.05 prerenal/ATN underlying CKD (baseline sCr 1.9), initially with gradual improvement with IVF  - continue to trend Creatinine.   - Encourage PO intake  - monitor I/Os.  - follow up hypercalcemia labs.     Endo: FS well controlled.   - ISS     ID: + TB   - Follow up [facility] recs regarding isolation, discharge and treatment of close contacts  - ID following   - C/w  600mg rifampin qd, 300mg Isoniazid qd, 1200mg qd of ethambutol M/W/F, 1500mg M/W/F of pyrazinimide  - C/w pyridoxine to prevent adverse s/e from Isoniazid therapy, 50mg qd  - continue to monitor fever curve   - Follow up need for non Con CT chest.    Heme: DVT PPX   - SQH     Dispo plan: follow up [facility].

## 2024-01-10 NOTE — DIETITIAN INITIAL EVALUATION ADULT - ADD RECOMMEND
1. Continue Regular diet, recommend ADD Nepro shake (420kcal, 19g protein) x1/day to promote intake.   >>Encourage & monitor PO intake. [place] dietary preferences as able.   2. Monitor GI tolerance, weight trends, labs, & skin integrity.  3. Defer bowel and pain regimens to team.   4. RD to remain available for diet education/intervention prn.  1. Continue Regular diet, recommend ADD Nepro shake (420kcal, 19g protein) x1/day to promote intake.   >>Encourage & monitor PO intake. [place] dietary preferences as able.   2. Monitor GI tolerance, weight trends, labs, & skin integrity.  3. Defer bowel and pain regimens to team.   4. RD to remain available for diet education/intervention prn.

## 2024-01-10 NOTE — DIETITIAN INITIAL EVALUATION ADULT - PERTINENT LABORATORY DATA
01-10    136  |  108  |  35<H>  ----------------------------<  110<H>  3.9   |  17<L>  |  2.16<H>    Ca    10.9<H>      10 Edgardo 2024 05:30  Phos  3.5     01-10  Mg     2.1     01-10    TPro  6.9  /  Alb  3.1<L>  /  TBili  0.5  /  DBili  x   /  AST  51<H>  /  ALT  31  /  AlkPhos  80  01-10  A1C with Estimated Average Glucose Result: 5.2 % (01-08-24 @ 05:30)

## 2024-01-10 NOTE — DIETITIAN INITIAL EVALUATION ADULT - NSFNSGIIOFT_GEN_A_CORE
01-09-24 @ 07:01  -  01-10-24 @ 07:00  --------------------------------------------------------  OUT:    Chest Tube (mL): 5 mL    Chest Tube (mL): 79 mL  Total OUT: 84 mL    Total NET: -84 mL      01-10-24 @ 07:01  -  01-10-24 @ 12:41  --------------------------------------------------------  OUT:    Chest Tube (mL): 0 mL  Total OUT: 0 mL    Total NET: 0 mL

## 2024-01-10 NOTE — PROGRESS NOTE ADULT - ASSESSMENT
73 year old M, Cantonese speaking, never a smoker, with PMHx of HTN, HLD, CKD3,BPH,  Gout, hx Shingles right arm ( 10/2023), hx RLQ pain since ( 8/2023) along with poor appetite, unintentional weight loss around 20 lbs, and progressively worse hoarseness and dyspnea. recent EGD and colonoscopy unremarkable. Pt was referred by Heme Onc Dr. Edward Jensen for an initial evaluation of right pleural effusion. Pt underwent 	R VATS RA Pleural biopsy and decortication (Frozen + Necrotizing granuloma) 1/5/24- pleural AFB positive, sputum AFB smear negative x 3 ( culture pending ) on RIBE + B 6   pod # 5    - 9LA for HD monitoring   - airborne isolation to rule out TB  - ID f/u appreciated, Team 1 Dr. 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     - started on RIPE Rx +Vitamin B6 50mg po daily   - Continue Rifampin 600 mg PO daily  - Continue  mg PO daily  - Continue Ethambutol 1200 mg PO three times a week (M, W, F)  - Continue pyrazinamide 1500 mg PO three times a week (M, W, F)  -  Continue vitamin B6 50 mg PO daily  - Will need Op exam for baseline Op exam since on Ethambutol  - Contact [facility]  - R CT x2 /f/u CXR in am reviewed   - O2 supplement as needed   - Renal f/u appreciated, for NARA on CKD3 (baseline Cr 1.8) : S Cr baseline 1.91( 12/15/23)-> 2.71 ( 1/5)-> 2.52( 1/6)-> 2.13( 1/7)-> 2.23( 1/8)-> 2.05(1/9) , UA noted w. mild proteinuria  - check ANCA labs per Renal  - Hypercalcemia: could be related to MTB. f/u Hypercalcemia workupas delineated per Renal   -  trend BMP for S Cr and avoid nephrotoxic meds/NSAIDS/iv contrast dye   - postop iv abx ppx: ceFAZolin   IVPB 2000 milliGRAM(s) IV Intermittent every 8 hours x3 completed   - pain control   - acetaminophen     Tablet .. 975 milliGRAM(s) Oral every 6 hours PRN  - oxyCODONE    IR 5 milliGRAM(s) Oral every 6 hours PRN  - bowel regimen:   - bisacodyl 5 milliGRAM(s) Oral at bedtime  - polyethylene glycol 3350 17 Gram(s) Oral daily  - senna 2 Tablet(s) Oral at bedtime  - HTN:   - amLODIPine   Tablet 5 milliGRAM(s) Oral daily  - metoprolol succinate ER 25 milliGRAM(s) Oral daily  - HLD: atorvastatin 10 milliGRAM(s) Oral at bedtime  - GI ppx: pantoprazole    Tablet 40 milliGRAM(s) Oral before breakfast  - DVT ppx: heparin   Injectable 5000 Unit(s) SubCutaneous every 8 hours    Contacts:   Referring Heme Onc Dr Joanne Jensen     POC as d/w family and CTS PA, wife    pt to follow up with [facility] and pmd.           73 year old M, Cantonese speaking, never a smoker, with PMHx of HTN, HLD, CKD3,BPH,  Gout, hx Shingles right arm ( 10/2023), hx RLQ pain since ( 8/2023) along with poor appetite, unintentional weight loss around 20 lbs, and progressively worse hoarseness and dyspnea. recent EGD and colonoscopy unremarkable. Pt was referred by Heme Onc Dr. Edward Jensen for an initial evaluation of right pleural effusion. Pt underwent 	R VATS RA Pleural biopsy and decortication (Frozen + Necrotizing granuloma) 1/5/24- pleural AFB positive, sputum AFB smear negative x 3 ( culture pending ) on RIBE + B 6   pod # 5    - 9LA for HD monitoring   - airborne isolation to rule out TB  - ID f/u appreciated, Team 1 Dr. 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     - started on RIPE Rx +Vitamin B6 50mg po daily   - Continue Rifampin 600 mg PO daily  - Continue  mg PO daily  - Continue Ethambutol 1200 mg PO three times a week (M, W, F)  - Continue pyrazinamide 1500 mg PO three times a week (M, W, F)  -  Continue vitamin B6 50 mg PO daily  - Will need Op exam for baseline Op exam since on Ethambutol  - Contact [facility]  - R CT x2 /f/u CXR in am reviewed   - O2 supplement as needed   - Renal f/u appreciated, for NARA on CKD3 (baseline Cr 1.8) : S Cr baseline 1.91( 12/15/23)-> 2.71 ( 1/5)-> 2.52( 1/6)-> 2.13( 1/7)-> 2.23( 1/8)-> 2.05(1/9) , UA noted w. mild proteinuria  - check ANCA labs per Renal  - Hypercalcemia: could be related to MTB. f/u Hypercalcemia workupas delineated per Renal   -  trend BMP for S Cr and avoid nephrotoxic meds/NSAIDS/iv contrast dye   - postop iv abx ppx: ceFAZolin   IVPB 2000 milliGRAM(s) IV Intermittent every 8 hours x3 completed   - pain control   - acetaminophen     Tablet .. 975 milliGRAM(s) Oral every 6 hours PRN  - oxyCODONE    IR 5 milliGRAM(s) Oral every 6 hours PRN  - bowel regimen:   - bisacodyl 5 milliGRAM(s) Oral at bedtime  - polyethylene glycol 3350 17 Gram(s) Oral daily  - senna 2 Tablet(s) Oral at bedtime  - HTN:   - amLODIPine   Tablet 5 milliGRAM(s) Oral daily  - metoprolol succinate ER 25 milliGRAM(s) Oral daily  - HLD: atorvastatin 10 milliGRAM(s) Oral at bedtime  - GI ppx: pantoprazole    Tablet 40 milliGRAM(s) Oral before breakfast  - DVT ppx: heparin   Injectable 5000 Unit(s) SubCutaneous every 8 hours    Contacts:   Referring Heme Onc Dr Joanne Jensen     POC as d/w family and CTS PA, wife    pt to follow up with [facility] and pmd.

## 2024-01-10 NOTE — DIETITIAN INITIAL EVALUATION ADULT - PERTINENT MEDS FT
MEDICATIONS  (STANDING):  atorvastatin 10 milliGRAM(s) Oral at bedtime  bisacodyl 5 milliGRAM(s) Oral at bedtime  ethambutol 1200 milliGRAM(s) Oral <User Schedule>  famotidine    Tablet 10 milliGRAM(s) Oral daily  heparin   Injectable 5000 Unit(s) SubCutaneous every 8 hours  hydrALAZINE 25 milliGRAM(s) Oral every 8 hours  isoniazid 300 milliGRAM(s) Oral daily  labetalol 100 milliGRAM(s) Oral three times a day  polyethylene glycol 3350 17 Gram(s) Oral daily  pyrazinamide 1500 milliGRAM(s) Oral <User Schedule>  pyridoxine 50 milliGRAM(s) Oral daily  rifAMPin 600 milliGRAM(s) Oral daily  senna 2 Tablet(s) Oral at bedtime    MEDICATIONS  (PRN):  acetaminophen     Tablet .. 975 milliGRAM(s) Oral every 6 hours PRN Mild Pain (1 - 3)  oxyCODONE    IR 5 milliGRAM(s) Oral every 6 hours PRN Moderate Pain (4 - 6)

## 2024-01-10 NOTE — DIETITIAN INITIAL EVALUATION ADULT - EDUCATION DIETARY MODIFICATIONS
Educated on strategies to promote intake including sources protein/energy dense foods and small frequent meals. Discussed moderate protein intake in setting of CKD and answered family questions high potassium foods. Discussed addition of oral nutrition supplement, family concern for elevated potassium levels, receptive to Nepro. Pt aware RD remains available for additional questions/concerns./(2) meets goals/outcomes/verbalization

## 2024-01-10 NOTE — PROGRESS NOTE ADULT - SUBJECTIVE AND OBJECTIVE BOX
Nephrology Follow up note:    Seen and evaluated at bedside, no overnight events reported by primary team. Remains on isolation.  Family member acted as  ~ denied new complaints reported by the pt    UOP        REVIEW OF SYSTEMS  --------------------------------------------------------------------------------  Gen: No fevers/chills, weakness  Skin: No rashes  Head/Eyes/Ears/Mouth: No headache; No hearing changes, mucous discharge, vision changes  Respiratory: No dyspnea, cough, wheezing  CV: No chest pain, palpitations  GI: No abdominal pain, diarrhea, constipation, nausea, vomiting, melena, hematochezia  : No increased frequency, dysuria, hematuria  MSK: No joint pain/swelling; no back pain; no edema  Neuro: No dizziness/lightheadedness, weakness, seizures, numbness  Heme: No easy bruising or bleeding    Standing Inpatient Medications  atorvastatin 10 milliGRAM(s) Oral at bedtime  bisacodyl 5 milliGRAM(s) Oral at bedtime  ethambutol 1200 milliGRAM(s) Oral <User Schedule>  famotidine    Tablet 10 milliGRAM(s) Oral daily  heparin   Injectable 5000 Unit(s) SubCutaneous every 8 hours  hydrALAZINE 25 milliGRAM(s) Oral every 8 hours  isoniazid 300 milliGRAM(s) Oral daily  labetalol 100 milliGRAM(s) Oral three times a day  polyethylene glycol 3350 17 Gram(s) Oral daily  pyrazinamide 1500 milliGRAM(s) Oral <User Schedule>  pyridoxine 50 milliGRAM(s) Oral daily  rifAMPin 600 milliGRAM(s) Oral daily  senna 2 Tablet(s) Oral at bedtime    PRN Inpatient Medications  acetaminophen     Tablet .. 975 milliGRAM(s) Oral every 6 hours PRN  oxyCODONE    IR 5 milliGRAM(s) Oral every 6 hours PRN        VITALS/PHYSICAL EXAM  --------------------------------------------------------------------------------  T(C): 36.5 (01-10-24 @ 13:14), Max: 37.2 (01-09-24 @ 22:22)  HR: 96 (01-10-24 @ 12:54) (96 - 120)  BP: 122/60 (01-10-24 @ 12:54) (122/60 - 148/69)  RR: 18 (01-10-24 @ 12:54) (18 - 20)  SpO2: 98% (01-10-24 @ 12:54) (96% - 98%)  Wt(kg): --        01-09-24 @ 07:01  -  01-10-24 @ 07:00  --------------------------------------------------------  IN: 1050 mL / OUT: 334 mL / NET: 716 mL    01-10-24 @ 07:01  -  01-10-24 @ 14:47  --------------------------------------------------------  IN: 450 mL / OUT: 1 mL / NET: 449 mL      Physical Exam:  	Gen: NAD, well-appearing  GENERAL: NAD, sitting in bed   NECK: supple, No JVD  CHEST/LUNG: Clear to auscultation bilaterally, decreased breath sounds, chest tubes in place draining  serosang output  HEART: normal S1S2, RRR  ABDOMEN: Soft, Nontender   EXTREMITIES: No clubbing, cyanosis, or edema   NEUROLOGY: AAO x3, no focal neurological deficit      LABS/STUDIES  --------------------------------------------------------------------------------              9.0    12.68 >-----------<  353      [01-10-24 @ 05:30]              27.3     136  |  108  |  35  ----------------------------<  110      [01-10-24 @ 05:30]  3.9   |  17  |  2.16        Ca     10.9     [01-10-24 @ 05:30]      Mg     2.1     [01-10-24 @ 05:30]      Phos  3.5     [01-10-24 @ 05:30]    TPro  6.9  /  Alb  3.1  /  TBili  0.5  /  DBili  x   /  AST  51  /  ALT  31  /  AlkPhos  80  [01-10-24 @ 05:30]    PT/INR: PT 12.3 , INR 1.08       [01-09-24 @ 05:30]  PTT: 35.7       [01-09-24 @ 05:30]      Creatinine Trend:  SCr 2.16 [01-10 @ 05:30]  SCr 2.05 [01-09 @ 05:30]  SCr 2.23 [01-08 @ 05:30]  SCr 2.13 [01-07 @ 05:30]  SCr 2.31 [01-06 @ 12:00]    Urinalysis - [01-10-24 @ 05:30]      Color  / Appearance  / SG  / pH       Gluc 110 / Ketone   / Bili  / Urobili        Blood  / Protein  / Leuk Est  / Nitrite       RBC  / WBC  / Hyaline  / Gran  / Sq Epi  / Non Sq Epi  / Bacteria     Urine Creatinine 40      [01-06-24 @ 10:24]  Urine Protein 38      [01-08-24 @ 17:39]  Urine Sodium 91      [01-06-24 @ 10:24]  Urine Urea Nitrogen 394      [01-06-24 @ 10:24]  Urine Potassium 32      [01-06-24 @ 10:24]  Urine Osmolality 391      [01-06-24 @ 10:24]    PTH -- (Ca 10.8)      [01-09-24 @ 05:30]   6  Vitamin D (25OH) 33.2      [01-06-24 @ 12:00]  TSH 0.928      [01-08-24 @ 05:30]    HCV 0.08, Nonreact      [01-06-24 @ 06:08]    ANCA: cANCA Negative, pANCA Negative, atypical ANCA Negative      [01-07-24 @ 15:05]  Free Light Chains: kappa 5.78, lambda 6.32, ratio = 0.91      [01-09 @ 05:30]

## 2024-01-10 NOTE — PROGRESS NOTE ADULT - ASSESSMENT
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 Hospital course c/b NARA nonoliguric  prerenal/ATN on CKD w/ peaked sCr at 2.71    1. Non oliguric NARA sec to prerenal/ATN - stable  SCr peaked at 2.71 now at 2.1  C/w strict I/O for monitoring  Adjust and dose all medications to current GFR  Work up for other potential disease mimics unremarkable (neg ANCA panel)    2. Hx of HTN -  BP not at goal previously due to rifampin possibly decreasing CCB and Metoprolol efficacy.   For now c/w hydralazine 25mg TID and Labetalol 100mg BID PO   Once sCr stable and at baseline plan to initiate ACE/ARB for optimal control as well as proteinuria     3. Hypercalcemia sec to granulomatous disease - Maintain adequate volume status  Recommend to reduce calcium intake (no more than 400 mg/day)  Recommend to reduce oxalate in diet  Recommend to avoid dietary vitamin D supplements  Expect gradual correction of Ca+ levels as treatment of the underlying disease continues    4. CKD Stage III - not yet at baseline  c/w previously recommended renoproctetive measures such as:   c/w Bicarb 650mg PO TID   C/w to avoid NSAIDs and other nephrotoxins     Discussed w/ primary team

## 2024-01-10 NOTE — DIETITIAN INITIAL EVALUATION ADULT - OTHER INFO
73 year old M, Cantonese speaking, never a smoker, with PMHx of HTN, HLD, CKD3,BPH,  Gout, hx Shingles right arm ( 10/2023), hx RLQ pain since ( 8/2023) along with poor appetite, unintentional weight loss around 20 lbs, and progressively worse hoarseness and dyspnea. recent EGD and colonoscopy unremarkable. Pt was referred by Heme Onc Dr. Edward Jensen for an initial evaluation of right pleural effusion. Pt underwent R VATS RA Pleural biopsy and decortication (Frozen + Necrotizing granuloma) 1/5/24.    Pt seen on 9LA for assessment. Labs and medication orders reviewed. Ordered for vitamin B6. Electrolytes WNL, BUN/Cr 35/2.16 <H>. On Regular diet. Pt speaks Cantonese, family declined , son preferred to translate for pt. Pt reports decreased appetite and intake ~x5 months PTA. Reports UBW ~175lb in 08/2023, current admission wt 153lb indicates 22lb/13% wt loss in <6 months - clinically significant. Nutrition-focused physical examination significant for mild-moderate wasting. Per ASPEN guidelines, pt meets criteria for severe chronic malnutrition - see nutrition risk notification. Pt denies difficulty chewing/swallowing. Denies nausea/vomiting/diarrhea/constipation, reports last BM yesterday 1/9. No Adventist/ethnic/cultural food preferences noted. No pressure injuries or edema documented, Humberto score 19. Recommended oral nutrition supplement, pt receptive. See nutrition recommendations. RD to remain available.   73 year old M, Cantonese speaking, never a smoker, with PMHx of HTN, HLD, CKD3,BPH,  Gout, hx Shingles right arm ( 10/2023), hx RLQ pain since ( 8/2023) along with poor appetite, unintentional weight loss around 20 lbs, and progressively worse hoarseness and dyspnea. recent EGD and colonoscopy unremarkable. Pt was referred by Heme Onc Dr. Edwrad Jensen for an initial evaluation of right pleural effusion. Pt underwent R VATS RA Pleural biopsy and decortication (Frozen + Necrotizing granuloma) 1/5/24.    Pt seen on 9LA for assessment. Labs and medication orders reviewed. Ordered for vitamin B6. Electrolytes WNL, BUN/Cr 35/2.16 <H>. On Regular diet. Pt speaks Cantonese, family declined , son preferred to translate for pt. Pt reports decreased appetite and intake ~x5 months PTA. Reports UBW ~175lb in 08/2023, current admission wt 153lb indicates 22lb/13% wt loss in <6 months - clinically significant. Nutrition-focused physical examination significant for mild-moderate wasting. Per ASPEN guidelines, pt meets criteria for severe chronic malnutrition - see nutrition risk notification. Pt denies difficulty chewing/swallowing. Denies nausea/vomiting/diarrhea/constipation, reports last BM yesterday 1/9. No Scientology/ethnic/cultural food preferences noted. No pressure injuries or edema documented, Humberto score 19. Recommended oral nutrition supplement, pt receptive. See nutrition recommendations. RD to remain available.

## 2024-01-10 NOTE — PROGRESS NOTE ADULT - SUBJECTIVE AND OBJECTIVE BOX
GUILLAUME MANNING , [id number],  [facility] [id number] [id number]    Time of encounter :  9 am   ambulating inside the room   wife at bedside  she stated - AARON has contacted her, she is relieved to know that pt TB is not contagious ( sputum AFB x 3 negative, Pleural AFB positive )  tolerating diet,   no pain reported during encounter.     T(C): 36.5 (01-10-24 @ 13:14), Max: 37.2 (01-09-24 @ 22:22)  HR: 96 (01-10-24 @ 12:54) (96 - 120)  BP: 122/60 (01-10-24 @ 12:54) (122/60 - 148/69)  RR: 18 (01-10-24 @ 12:54) (18 - 20)  SpO2: 98% (01-10-24 @ 12:54) (96% - 98%)    acetaminophen     Tablet .. 975 milliGRAM(s) Oral every 6 hours PRN  atorvastatin 10 milliGRAM(s) Oral at bedtime  bisacodyl 5 milliGRAM(s) Oral at bedtime  ethambutol 1200 milliGRAM(s) Oral <User Schedule>  famotidine    Tablet 10 milliGRAM(s) Oral daily  heparin   Injectable 5000 Unit(s) SubCutaneous every 8 hours  hydrALAZINE 25 milliGRAM(s) Oral every 8 hours  isoniazid 300 milliGRAM(s) Oral daily  labetalol 100 milliGRAM(s) Oral three times a day  oxyCODONE    IR 5 milliGRAM(s) Oral every 6 hours PRN  polyethylene glycol 3350 17 Gram(s) Oral daily  pyrazinamide 1500 milliGRAM(s) Oral <User Schedule>  pyridoxine 50 milliGRAM(s) Oral daily  rifAMPin 600 milliGRAM(s) Oral daily  senna 2 Tablet(s) Oral at bedtime      Physical Exam :    General exam : ambulatory.   RRR  moving good air bilaterally   no edema noted on bilateral lower ext.    CBC Full  -  ( 10 Edgardo 2024 05:30 )  WBC Count : 12.68 K/uL  RBC Count : 3.05 M/uL  Hemoglobin : 9.0 g/dL  Hematocrit : 27.3 %  Platelet Count - Automated : 353 K/uL  Mean Cell Volume : 89.5 fl  Mean Cell Hemoglobin : 29.5 pg  Mean Cell Hemoglobin Concentration : 33.0 gm/dL  Auto Neutrophil # : 7.09 K/uL  Auto Lymphocyte # : 2.44 K/uL  Auto Monocyte # : 1.22 K/uL  Auto Eosinophil # : 1.69 K/uL  Auto Basophil # : 0.07 K/uL  Auto Neutrophil % : 56.0 %  Auto Lymphocyte % : 19.2 %  Auto Monocyte % : 9.6 %  Auto Eosinophil % : 13.3 %  Auto Basophil % : 0.6 %        01-10    136  |  108  |  35<H>  ----------------------------<  110<H>  3.9   |  17<L>  |  2.16<H>    Ca    10.9<H>      10 Edgardo 2024 05:30  Phos  3.5     01-10  Mg     2.1     01-10    TPro  6.9  /  Alb  3.1<L>  /  TBili  0.5  /  DBili  x   /  AST  51<H>  /  ALT  31  /  AlkPhos  80  01-10    Daily     Daily   CAPILLARY BLOOD GLUCOSE          Culture - Acid Fast - Sputum w/Smear (collected 08 Jan 2024 17:17)  Source: .Sputum Sputum  Preliminary Report (10 Edgardo 2024 15:09):    Culture is being performed.    Culture - Acid Fast - Sputum w/Smear (collected 08 Jan 2024 10:45)  Source: .Sputum Sputum  Preliminary Report (10 Edgardo 2024 15:08):    Culture is being performed.      Urinalysis Basic - ( 10 Edgardo 2024 05:30 )    Color: x / Appearance: x / SG: x / pH: x  Gluc: 110 mg/dL / Ketone: x  / Bili: x / Urobili: x   Blood: x / Protein: x / Nitrite: x   Leuk Esterase: x / RBC: x / WBC x   Sq Epi: x / Non Sq Epi: x / Bacteria: x        PT/INR - ( 09 Jan 2024 05:30 )   PT: 12.3 sec;   INR: 1.08          PTT - ( 09 Jan 2024 05:30 )  PTT:35.7 sec           GUILLAUME MANNING , [id number],  [facility] [address] [address]    Time of encounter :  9 am   ambulating inside the room   wife at bedside  she stated - AARON has contacted her, she is relieved to know that pt TB is not contagious ( sputum AFB x 3 negative, Pleural AFB positive )  tolerating diet,   no pain reported during encounter.     T(C): 36.5 (01-10-24 @ 13:14), Max: 37.2 (01-09-24 @ 22:22)  HR: 96 (01-10-24 @ 12:54) (96 - 120)  BP: 122/60 (01-10-24 @ 12:54) (122/60 - 148/69)  RR: 18 (01-10-24 @ 12:54) (18 - 20)  SpO2: 98% (01-10-24 @ 12:54) (96% - 98%)    acetaminophen     Tablet .. 975 milliGRAM(s) Oral every 6 hours PRN  atorvastatin 10 milliGRAM(s) Oral at bedtime  bisacodyl 5 milliGRAM(s) Oral at bedtime  ethambutol 1200 milliGRAM(s) Oral <User Schedule>  famotidine    Tablet 10 milliGRAM(s) Oral daily  heparin   Injectable 5000 Unit(s) SubCutaneous every 8 hours  hydrALAZINE 25 milliGRAM(s) Oral every 8 hours  isoniazid 300 milliGRAM(s) Oral daily  labetalol 100 milliGRAM(s) Oral three times a day  oxyCODONE    IR 5 milliGRAM(s) Oral every 6 hours PRN  polyethylene glycol 3350 17 Gram(s) Oral daily  pyrazinamide 1500 milliGRAM(s) Oral <User Schedule>  pyridoxine 50 milliGRAM(s) Oral daily  rifAMPin 600 milliGRAM(s) Oral daily  senna 2 Tablet(s) Oral at bedtime      Physical Exam :    General exam : ambulatory.   RRR  moving good air bilaterally   no edema noted on bilateral lower ext.    CBC Full  -  ( 10 Edgardo 2024 05:30 )  WBC Count : 12.68 K/uL  RBC Count : 3.05 M/uL  Hemoglobin : 9.0 g/dL  Hematocrit : 27.3 %  Platelet Count - Automated : 353 K/uL  Mean Cell Volume : 89.5 fl  Mean Cell Hemoglobin : 29.5 pg  Mean Cell Hemoglobin Concentration : 33.0 gm/dL  Auto Neutrophil # : 7.09 K/uL  Auto Lymphocyte # : 2.44 K/uL  Auto Monocyte # : 1.22 K/uL  Auto Eosinophil # : 1.69 K/uL  Auto Basophil # : 0.07 K/uL  Auto Neutrophil % : 56.0 %  Auto Lymphocyte % : 19.2 %  Auto Monocyte % : 9.6 %  Auto Eosinophil % : 13.3 %  Auto Basophil % : 0.6 %        01-10    136  |  108  |  35<H>  ----------------------------<  110<H>  3.9   |  17<L>  |  2.16<H>    Ca    10.9<H>      10 Edgardo 2024 05:30  Phos  3.5     01-10  Mg     2.1     01-10    TPro  6.9  /  Alb  3.1<L>  /  TBili  0.5  /  DBili  x   /  AST  51<H>  /  ALT  31  /  AlkPhos  80  01-10    Daily     Daily   CAPILLARY BLOOD GLUCOSE          Culture - Acid Fast - Sputum w/Smear (collected 08 Jan 2024 17:17)  Source: .Sputum Sputum  Preliminary Report (10 Edgardo 2024 15:09):    Culture is being performed.    Culture - Acid Fast - Sputum w/Smear (collected 08 Jan 2024 10:45)  Source: .Sputum Sputum  Preliminary Report (10 Edgardo 2024 15:08):    Culture is being performed.      Urinalysis Basic - ( 10 Edgardo 2024 05:30 )    Color: x / Appearance: x / SG: x / pH: x  Gluc: 110 mg/dL / Ketone: x  / Bili: x / Urobili: x   Blood: x / Protein: x / Nitrite: x   Leuk Esterase: x / RBC: x / WBC x   Sq Epi: x / Non Sq Epi: x / Bacteria: x        PT/INR - ( 09 Jan 2024 05:30 )   PT: 12.3 sec;   INR: 1.08          PTT - ( 09 Jan 2024 05:30 )  PTT:35.7 sec

## 2024-01-10 NOTE — PROGRESS NOTE ADULT - SUBJECTIVE AND OBJECTIVE BOX
Patient discussed on morning rounds with Dr. Arthur     Operation / Date: 1/5: R VATS RA Pleural biopsy and Decortication  Frozen + Necrotizing granuloma     SUBJECTIVE ASSESSMENT:  73y Male reports that he feels much better now that the tubes have been removed. He is anxiously awaiting his discharge. No complaints at this time.         Vital Signs Last 24 Hrs  T(C): 36.5 (10 Edgardo 2024 13:14), Max: 37.2 (09 Jan 2024 22:22)  T(F): 97.7 (10 Edgardo 2024 13:14), Max: 98.9 (09 Jan 2024 22:22)  HR: 96 (10 Edgardo 2024 12:54) (96 - 120)  BP: 122/60 (10 Edgardo 2024 12:54) (122/60 - 148/69)  BP(mean): 87 (10 Edgardo 2024 12:54) (87 - 99)  RR: 18 (10 Edgardo 2024 12:54) (18 - 20)  SpO2: 98% (10 Edgardo 2024 12:54) (96% - 98%)    Parameters below as of 10 Edgardo 2024 12:54  Patient On (Oxygen Delivery Method): room air      I&O's Detail    09 Jan 2024 07:01  -  10 Edgardo 2024 07:00  --------------------------------------------------------  IN:    Oral Fluid: 1050 mL  Total IN: 1050 mL    OUT:    Chest Tube (mL): 5 mL    Chest Tube (mL): 79 mL    Voided (mL): 250 mL  Total OUT: 334 mL    Total NET: 716 mL      10 Edgardo 2024 07:01  -  10 Edgardo 2024 15:47  --------------------------------------------------------  IN:    Oral Fluid: 450 mL  Total IN: 450 mL    OUT:    Chest Tube (mL): 0 mL    Voided (mL): 1 mL  Total OUT: 1 mL    Total NET: 449 mL    CHEST TUBE: No  DEON DRAIN:  No.  EPICARDIAL WIRES: No.  TIE DOWNS: No.  TRUJILLO: No.    PHYSICAL EXAM:  GEN: NAD, looks comfortable  Psych: Mood appropriate  Neuro: A&Ox3.  No focal deficits.  Moving all extremities.   HEENT: No obvious abnormalities  CV: S1S2, regular, no murmurs appreciated.  No carotid bruits.  No JVD  Lungs: Clear B/L.  No wheezing, rales or rhonchi  ABD: Soft, non-tender, non-distended.  +Bowel sounds  EXT: Warm and well perfused.  No peripheral edema noted  Musculoskeletal: Moving all extremities with normal ROM, no joint swelling  PV: Pedal pulses palpable  Incisions: thorascoopic incisions are c/d/i without erythema or drainage.   LABS:                        9.0    12.68 )-----------( 353      ( 10 Edgardo 2024 05:30 )             27.3       COUMADIN:  Yes/No. REASON: .    PT/INR - ( 09 Jan 2024 05:30 )   PT: 12.3 sec;   INR: 1.08          PTT - ( 09 Jan 2024 05:30 )  PTT:35.7 sec    01-10    136  |  108  |  35<H>  ----------------------------<  110<H>  3.9   |  17<L>  |  2.16<H>    Ca    10.9<H>      10 Edgardo 2024 05:30  Phos  3.5     01-10  Mg     2.1     01-10    TPro  6.9  /  Alb  3.1<L>  /  TBili  0.5  /  DBili  x   /  AST  51<H>  /  ALT  31  /  AlkPhos  80  01-10      Urinalysis Basic - ( 10 Edgardo 2024 05:30 )    Color: x / Appearance: x / SG: x / pH: x  Gluc: 110 mg/dL / Ketone: x  / Bili: x / Urobili: x   Blood: x / Protein: x / Nitrite: x   Leuk Esterase: x / RBC: x / WBC x   Sq Epi: x / Non Sq Epi: x / Bacteria: x        MEDICATIONS  (STANDING):  atorvastatin 10 milliGRAM(s) Oral at bedtime  bisacodyl 5 milliGRAM(s) Oral at bedtime  ethambutol 1200 milliGRAM(s) Oral <User Schedule>  famotidine    Tablet 10 milliGRAM(s) Oral daily  heparin   Injectable 5000 Unit(s) SubCutaneous every 8 hours  hydrALAZINE 25 milliGRAM(s) Oral every 8 hours  isoniazid 300 milliGRAM(s) Oral daily  labetalol 100 milliGRAM(s) Oral three times a day  polyethylene glycol 3350 17 Gram(s) Oral daily  pyrazinamide 1500 milliGRAM(s) Oral <User Schedule>  pyridoxine 50 milliGRAM(s) Oral daily  rifAMPin 600 milliGRAM(s) Oral daily  senna 2 Tablet(s) Oral at bedtime    MEDICATIONS  (PRN):  acetaminophen     Tablet .. 975 milliGRAM(s) Oral every 6 hours PRN Mild Pain (1 - 3)  oxyCODONE    IR 5 milliGRAM(s) Oral every 6 hours PRN Moderate Pain (4 - 6)        RADIOLOGY & ADDITIONAL TESTS:

## 2024-01-10 NOTE — DIETITIAN NUTRITION RISK NOTIFICATION - ADDITIONAL COMMENTS/DIETITIAN RECOMMENDATIONS
Continue Regular diet, recommend ADD Nepro shake (420kcal, 19g protein) x1/day to promote intake.   >>Encourage & monitor PO intake. [place] dietary preferences as able.  Continue Regular diet, recommend ADD Nepro shake (420kcal, 19g protein) x1/day to promote intake.   >>Encourage & monitor PO intake. [place] dietary preferences as able.

## 2024-01-11 ENCOUNTER — TRANSCRIPTION ENCOUNTER (OUTPATIENT)
Age: 74
End: 2024-01-11

## 2024-01-11 LAB
% ALBUMIN: 45 % — SIGNIFICANT CHANGE UP
% ALBUMIN: 45 % — SIGNIFICANT CHANGE UP
% ALPHA 1: 6.3 % — SIGNIFICANT CHANGE UP
% ALPHA 1: 6.3 % — SIGNIFICANT CHANGE UP
% ALPHA 2: 12 % — SIGNIFICANT CHANGE UP
% ALPHA 2: 12 % — SIGNIFICANT CHANGE UP
% BETA: 11.7 % — SIGNIFICANT CHANGE UP
% BETA: 11.7 % — SIGNIFICANT CHANGE UP
% GAMMA, URINE: 10 % — SIGNIFICANT CHANGE UP
% GAMMA, URINE: 10 % — SIGNIFICANT CHANGE UP
% GAMMA: 25 % — SIGNIFICANT CHANGE UP
% GAMMA: 25 % — SIGNIFICANT CHANGE UP
ALBUMIN 24H MFR UR ELPH: 22.6 % — SIGNIFICANT CHANGE UP
ALBUMIN 24H MFR UR ELPH: 22.6 % — SIGNIFICANT CHANGE UP
ALBUMIN SERPL ELPH-MCNC: 3.2 G/DL — LOW (ref 3.6–5.5)
ALBUMIN SERPL ELPH-MCNC: 3.2 G/DL — LOW (ref 3.6–5.5)
ALBUMIN SERPL ELPH-MCNC: 3.4 G/DL — SIGNIFICANT CHANGE UP (ref 3.3–5)
ALBUMIN SERPL ELPH-MCNC: 3.4 G/DL — SIGNIFICANT CHANGE UP (ref 3.3–5)
ALBUMIN/GLOB SERPL ELPH: 0.8 RATIO — SIGNIFICANT CHANGE UP
ALBUMIN/GLOB SERPL ELPH: 0.8 RATIO — SIGNIFICANT CHANGE UP
ALP SERPL-CCNC: 87 U/L — SIGNIFICANT CHANGE UP (ref 40–120)
ALP SERPL-CCNC: 87 U/L — SIGNIFICANT CHANGE UP (ref 40–120)
ALPHA1 GLOB 24H MFR UR ELPH: 36.3 % — SIGNIFICANT CHANGE UP
ALPHA1 GLOB 24H MFR UR ELPH: 36.3 % — SIGNIFICANT CHANGE UP
ALPHA1 GLOB SERPL ELPH-MCNC: 0.5 G/DL — HIGH (ref 0.1–0.4)
ALPHA1 GLOB SERPL ELPH-MCNC: 0.5 G/DL — HIGH (ref 0.1–0.4)
ALPHA2 GLOB 24H MFR UR ELPH: 19.1 % — SIGNIFICANT CHANGE UP
ALPHA2 GLOB 24H MFR UR ELPH: 19.1 % — SIGNIFICANT CHANGE UP
ALPHA2 GLOB SERPL ELPH-MCNC: 0.9 G/DL — SIGNIFICANT CHANGE UP (ref 0.5–1)
ALPHA2 GLOB SERPL ELPH-MCNC: 0.9 G/DL — SIGNIFICANT CHANGE UP (ref 0.5–1)
ALT FLD-CCNC: 43 U/L — SIGNIFICANT CHANGE UP (ref 10–45)
ALT FLD-CCNC: 43 U/L — SIGNIFICANT CHANGE UP (ref 10–45)
ANION GAP SERPL CALC-SCNC: 15 MMOL/L — SIGNIFICANT CHANGE UP (ref 5–17)
ANION GAP SERPL CALC-SCNC: 15 MMOL/L — SIGNIFICANT CHANGE UP (ref 5–17)
APPEARANCE UR: CLEAR — SIGNIFICANT CHANGE UP
APPEARANCE UR: CLEAR — SIGNIFICANT CHANGE UP
AST SERPL-CCNC: 59 U/L — HIGH (ref 10–40)
AST SERPL-CCNC: 59 U/L — HIGH (ref 10–40)
B-GLOBULIN 24H MFR UR ELPH: 12 % — SIGNIFICANT CHANGE UP
B-GLOBULIN 24H MFR UR ELPH: 12 % — SIGNIFICANT CHANGE UP
B-GLOBULIN SERPL ELPH-MCNC: 0.8 G/DL — SIGNIFICANT CHANGE UP (ref 0.5–1)
B-GLOBULIN SERPL ELPH-MCNC: 0.8 G/DL — SIGNIFICANT CHANGE UP (ref 0.5–1)
BACTERIA # UR AUTO: ABNORMAL /HPF
BACTERIA # UR AUTO: ABNORMAL /HPF
BASOPHILS # BLD AUTO: 0.05 K/UL — SIGNIFICANT CHANGE UP (ref 0–0.2)
BASOPHILS # BLD AUTO: 0.05 K/UL — SIGNIFICANT CHANGE UP (ref 0–0.2)
BASOPHILS NFR BLD AUTO: 0.3 % — SIGNIFICANT CHANGE UP (ref 0–2)
BASOPHILS NFR BLD AUTO: 0.3 % — SIGNIFICANT CHANGE UP (ref 0–2)
BILIRUB SERPL-MCNC: 0.5 MG/DL — SIGNIFICANT CHANGE UP (ref 0.2–1.2)
BILIRUB SERPL-MCNC: 0.5 MG/DL — SIGNIFICANT CHANGE UP (ref 0.2–1.2)
BILIRUB UR-MCNC: ABNORMAL
BILIRUB UR-MCNC: ABNORMAL
BUN SERPL-MCNC: 42 MG/DL — HIGH (ref 7–23)
BUN SERPL-MCNC: 42 MG/DL — HIGH (ref 7–23)
CALCIUM SERPL-MCNC: 11.2 MG/DL — HIGH (ref 8.4–10.5)
CALCIUM SERPL-MCNC: 11.2 MG/DL — HIGH (ref 8.4–10.5)
CAST: 8 /LPF — HIGH (ref 0–4)
CAST: 8 /LPF — HIGH (ref 0–4)
CHLORIDE SERPL-SCNC: 111 MMOL/L — HIGH (ref 96–108)
CHLORIDE SERPL-SCNC: 111 MMOL/L — HIGH (ref 96–108)
CO2 SERPL-SCNC: 15 MMOL/L — LOW (ref 22–31)
CO2 SERPL-SCNC: 15 MMOL/L — LOW (ref 22–31)
COD CRY URNS QL: PRESENT
COD CRY URNS QL: PRESENT
COLOR SPEC: SIGNIFICANT CHANGE UP
COLOR SPEC: SIGNIFICANT CHANGE UP
CREAT ?TM UR-MCNC: 190 MG/DL — SIGNIFICANT CHANGE UP
CREAT ?TM UR-MCNC: 190 MG/DL — SIGNIFICANT CHANGE UP
CREAT SERPL-MCNC: 2.43 MG/DL — HIGH (ref 0.5–1.3)
CREAT SERPL-MCNC: 2.43 MG/DL — HIGH (ref 0.5–1.3)
DIFF PNL FLD: NEGATIVE — SIGNIFICANT CHANGE UP
DIFF PNL FLD: NEGATIVE — SIGNIFICANT CHANGE UP
EGFR: 28 ML/MIN/1.73M2 — LOW
EGFR: 28 ML/MIN/1.73M2 — LOW
EOSINOPHIL # BLD AUTO: 0.52 K/UL — HIGH (ref 0–0.5)
EOSINOPHIL # BLD AUTO: 0.52 K/UL — HIGH (ref 0–0.5)
EOSINOPHIL NFR BLD AUTO: 3.5 % — SIGNIFICANT CHANGE UP (ref 0–6)
EOSINOPHIL NFR BLD AUTO: 3.5 % — SIGNIFICANT CHANGE UP (ref 0–6)
GAMMA GLOBULIN: 1.8 G/DL — HIGH (ref 0.6–1.6)
GAMMA GLOBULIN: 1.8 G/DL — HIGH (ref 0.6–1.6)
GLUCOSE SERPL-MCNC: 118 MG/DL — HIGH (ref 70–99)
GLUCOSE SERPL-MCNC: 118 MG/DL — HIGH (ref 70–99)
GLUCOSE UR QL: NEGATIVE MG/DL — SIGNIFICANT CHANGE UP
GLUCOSE UR QL: NEGATIVE MG/DL — SIGNIFICANT CHANGE UP
HCT VFR BLD CALC: 28.6 % — LOW (ref 39–50)
HCT VFR BLD CALC: 28.6 % — LOW (ref 39–50)
HGB BLD-MCNC: 9.5 G/DL — LOW (ref 13–17)
HGB BLD-MCNC: 9.5 G/DL — LOW (ref 13–17)
HYALINE CASTS # UR AUTO: PRESENT
HYALINE CASTS # UR AUTO: PRESENT
IMM GRANULOCYTES NFR BLD AUTO: 0.9 % — SIGNIFICANT CHANGE UP (ref 0–0.9)
IMM GRANULOCYTES NFR BLD AUTO: 0.9 % — SIGNIFICANT CHANGE UP (ref 0–0.9)
INTERPRETATION 24H UR IFE-IMP: SIGNIFICANT CHANGE UP
INTERPRETATION 24H UR IFE-IMP: SIGNIFICANT CHANGE UP
INTERPRETATION SERPL IFE-IMP: SIGNIFICANT CHANGE UP
INTERPRETATION SERPL IFE-IMP: SIGNIFICANT CHANGE UP
KETONES UR-MCNC: NEGATIVE MG/DL — SIGNIFICANT CHANGE UP
KETONES UR-MCNC: NEGATIVE MG/DL — SIGNIFICANT CHANGE UP
LEUKOCYTE ESTERASE UR-ACNC: ABNORMAL
LEUKOCYTE ESTERASE UR-ACNC: ABNORMAL
LYMPHOCYTES # BLD AUTO: 15.9 % — SIGNIFICANT CHANGE UP (ref 13–44)
LYMPHOCYTES # BLD AUTO: 15.9 % — SIGNIFICANT CHANGE UP (ref 13–44)
LYMPHOCYTES # BLD AUTO: 2.38 K/UL — SIGNIFICANT CHANGE UP (ref 1–3.3)
LYMPHOCYTES # BLD AUTO: 2.38 K/UL — SIGNIFICANT CHANGE UP (ref 1–3.3)
M PROTEIN 24H UR ELPH-MRATE: SIGNIFICANT CHANGE UP
M PROTEIN 24H UR ELPH-MRATE: SIGNIFICANT CHANGE UP
MAGNESIUM SERPL-MCNC: 2 MG/DL — SIGNIFICANT CHANGE UP (ref 1.6–2.6)
MAGNESIUM SERPL-MCNC: 2 MG/DL — SIGNIFICANT CHANGE UP (ref 1.6–2.6)
MCHC RBC-ENTMCNC: 29.9 PG — SIGNIFICANT CHANGE UP (ref 27–34)
MCHC RBC-ENTMCNC: 29.9 PG — SIGNIFICANT CHANGE UP (ref 27–34)
MCHC RBC-ENTMCNC: 33.2 GM/DL — SIGNIFICANT CHANGE UP (ref 32–36)
MCHC RBC-ENTMCNC: 33.2 GM/DL — SIGNIFICANT CHANGE UP (ref 32–36)
MCV RBC AUTO: 89.9 FL — SIGNIFICANT CHANGE UP (ref 80–100)
MCV RBC AUTO: 89.9 FL — SIGNIFICANT CHANGE UP (ref 80–100)
MONOCYTES # BLD AUTO: 0.87 K/UL — SIGNIFICANT CHANGE UP (ref 0–0.9)
MONOCYTES # BLD AUTO: 0.87 K/UL — SIGNIFICANT CHANGE UP (ref 0–0.9)
MONOCYTES NFR BLD AUTO: 5.8 % — SIGNIFICANT CHANGE UP (ref 2–14)
MONOCYTES NFR BLD AUTO: 5.8 % — SIGNIFICANT CHANGE UP (ref 2–14)
MUCOUS THREADS # UR AUTO: PRESENT
MUCOUS THREADS # UR AUTO: PRESENT
NEUTROPHILS # BLD AUTO: 11.06 K/UL — HIGH (ref 1.8–7.4)
NEUTROPHILS # BLD AUTO: 11.06 K/UL — HIGH (ref 1.8–7.4)
NEUTROPHILS NFR BLD AUTO: 73.6 % — SIGNIFICANT CHANGE UP (ref 43–77)
NEUTROPHILS NFR BLD AUTO: 73.6 % — SIGNIFICANT CHANGE UP (ref 43–77)
NITRITE UR-MCNC: POSITIVE
NITRITE UR-MCNC: POSITIVE
NRBC # BLD: 0 /100 WBCS — SIGNIFICANT CHANGE UP (ref 0–0)
NRBC # BLD: 0 /100 WBCS — SIGNIFICANT CHANGE UP (ref 0–0)
OSMOLALITY UR: 535 MOSM/KG — SIGNIFICANT CHANGE UP (ref 300–900)
OSMOLALITY UR: 535 MOSM/KG — SIGNIFICANT CHANGE UP (ref 300–900)
PH UR: 5 — SIGNIFICANT CHANGE UP (ref 5–8)
PH UR: 5 — SIGNIFICANT CHANGE UP (ref 5–8)
PHOSPHATE SERPL-MCNC: 3.5 MG/DL — SIGNIFICANT CHANGE UP (ref 2.5–4.5)
PHOSPHATE SERPL-MCNC: 3.5 MG/DL — SIGNIFICANT CHANGE UP (ref 2.5–4.5)
PLATELET # BLD AUTO: 405 K/UL — HIGH (ref 150–400)
PLATELET # BLD AUTO: 405 K/UL — HIGH (ref 150–400)
POTASSIUM SERPL-MCNC: 4.4 MMOL/L — SIGNIFICANT CHANGE UP (ref 3.5–5.3)
POTASSIUM SERPL-MCNC: 4.4 MMOL/L — SIGNIFICANT CHANGE UP (ref 3.5–5.3)
POTASSIUM SERPL-SCNC: 4.4 MMOL/L — SIGNIFICANT CHANGE UP (ref 3.5–5.3)
POTASSIUM SERPL-SCNC: 4.4 MMOL/L — SIGNIFICANT CHANGE UP (ref 3.5–5.3)
POTASSIUM UR-SCNC: 70 MMOL/L — SIGNIFICANT CHANGE UP
POTASSIUM UR-SCNC: 70 MMOL/L — SIGNIFICANT CHANGE UP
PROT ?TM UR-MCNC: 32 MG/DL — HIGH (ref 0–12)
PROT ?TM UR-MCNC: 32 MG/DL — HIGH (ref 0–12)
PROT ?TM UR-MCNC: 38 MG/DL — HIGH (ref 0–12)
PROT ?TM UR-MCNC: 38 MG/DL — HIGH (ref 0–12)
PROT PATTERN 24H UR ELPH-IMP: SIGNIFICANT CHANGE UP
PROT PATTERN 24H UR ELPH-IMP: SIGNIFICANT CHANGE UP
PROT PATTERN SERPL ELPH-IMP: SIGNIFICANT CHANGE UP
PROT PATTERN SERPL ELPH-IMP: SIGNIFICANT CHANGE UP
PROT SERPL-MCNC: 7.2 G/DL — SIGNIFICANT CHANGE UP (ref 6–8.3)
PROT UR-MCNC: 30 MG/DL
PROT UR-MCNC: 30 MG/DL
PROT/CREAT UR-RTO: 0.2 RATIO — SIGNIFICANT CHANGE UP (ref 0–0.2)
PROT/CREAT UR-RTO: 0.2 RATIO — SIGNIFICANT CHANGE UP (ref 0–0.2)
RBC # BLD: 3.18 M/UL — LOW (ref 4.2–5.8)
RBC # BLD: 3.18 M/UL — LOW (ref 4.2–5.8)
RBC # FLD: 17.3 % — HIGH (ref 10.3–14.5)
RBC # FLD: 17.3 % — HIGH (ref 10.3–14.5)
RBC CASTS # UR COMP ASSIST: 4 /HPF — SIGNIFICANT CHANGE UP (ref 0–4)
RBC CASTS # UR COMP ASSIST: 4 /HPF — SIGNIFICANT CHANGE UP (ref 0–4)
SODIUM SERPL-SCNC: 141 MMOL/L — SIGNIFICANT CHANGE UP (ref 135–145)
SODIUM SERPL-SCNC: 141 MMOL/L — SIGNIFICANT CHANGE UP (ref 135–145)
SODIUM UR-SCNC: 30 MMOL/L — SIGNIFICANT CHANGE UP
SODIUM UR-SCNC: 30 MMOL/L — SIGNIFICANT CHANGE UP
SP GR SPEC: 1.02 — SIGNIFICANT CHANGE UP (ref 1–1.03)
SP GR SPEC: 1.02 — SIGNIFICANT CHANGE UP (ref 1–1.03)
SPERM AB SPEC-ACNC: PRESENT
SPERM AB SPEC-ACNC: PRESENT
SQUAMOUS # UR AUTO: 1 /HPF — SIGNIFICANT CHANGE UP (ref 0–5)
SQUAMOUS # UR AUTO: 1 /HPF — SIGNIFICANT CHANGE UP (ref 0–5)
SURGICAL PATHOLOGY STUDY: SIGNIFICANT CHANGE UP
SURGICAL PATHOLOGY STUDY: SIGNIFICANT CHANGE UP
UROBILINOGEN FLD QL: 1 MG/DL — SIGNIFICANT CHANGE UP (ref 0.2–1)
UROBILINOGEN FLD QL: 1 MG/DL — SIGNIFICANT CHANGE UP (ref 0.2–1)
UUN UR-MCNC: 822 MG/DL — SIGNIFICANT CHANGE UP
UUN UR-MCNC: 822 MG/DL — SIGNIFICANT CHANGE UP
WBC # BLD: 15.01 K/UL — HIGH (ref 3.8–10.5)
WBC # BLD: 15.01 K/UL — HIGH (ref 3.8–10.5)
WBC # FLD AUTO: 15.01 K/UL — HIGH (ref 3.8–10.5)
WBC # FLD AUTO: 15.01 K/UL — HIGH (ref 3.8–10.5)
WBC UR QL: 6 /HPF — HIGH (ref 0–5)
WBC UR QL: 6 /HPF — HIGH (ref 0–5)

## 2024-01-11 PROCEDURE — 71045 X-RAY EXAM CHEST 1 VIEW: CPT | Mod: 26,77

## 2024-01-11 PROCEDURE — 99233 SBSQ HOSP IP/OBS HIGH 50: CPT

## 2024-01-11 PROCEDURE — 71045 X-RAY EXAM CHEST 1 VIEW: CPT | Mod: 26

## 2024-01-11 PROCEDURE — 99232 SBSQ HOSP IP/OBS MODERATE 35: CPT

## 2024-01-11 RX ORDER — HYDROMORPHONE HYDROCHLORIDE 2 MG/ML
0.25 INJECTION INTRAMUSCULAR; INTRAVENOUS; SUBCUTANEOUS ONCE
Refills: 0 | Status: DISCONTINUED | OUTPATIENT
Start: 2024-01-11 | End: 2024-01-11

## 2024-01-11 RX ADMIN — SENNA PLUS 2 TABLET(S): 8.6 TABLET ORAL at 21:29

## 2024-01-11 RX ADMIN — Medication 975 MILLIGRAM(S): at 16:53

## 2024-01-11 RX ADMIN — Medication 50 MILLIGRAM(S): at 11:53

## 2024-01-11 RX ADMIN — Medication 100 MILLIGRAM(S): at 06:06

## 2024-01-11 RX ADMIN — Medication 975 MILLIGRAM(S): at 17:35

## 2024-01-11 RX ADMIN — Medication 25 MILLIGRAM(S): at 01:00

## 2024-01-11 RX ADMIN — HYDROMORPHONE HYDROCHLORIDE 0.25 MILLIGRAM(S): 2 INJECTION INTRAMUSCULAR; INTRAVENOUS; SUBCUTANEOUS at 15:02

## 2024-01-11 RX ADMIN — HEPARIN SODIUM 5000 UNIT(S): 5000 INJECTION INTRAVENOUS; SUBCUTANEOUS at 06:06

## 2024-01-11 RX ADMIN — Medication 975 MILLIGRAM(S): at 09:30

## 2024-01-11 RX ADMIN — Medication 300 MILLIGRAM(S): at 11:53

## 2024-01-11 RX ADMIN — Medication 100 MILLIGRAM(S): at 10:36

## 2024-01-11 RX ADMIN — HEPARIN SODIUM 5000 UNIT(S): 5000 INJECTION INTRAVENOUS; SUBCUTANEOUS at 21:29

## 2024-01-11 RX ADMIN — Medication 25 MILLIGRAM(S): at 09:02

## 2024-01-11 RX ADMIN — Medication 100 MILLIGRAM(S): at 17:56

## 2024-01-11 RX ADMIN — Medication 25 MILLIGRAM(S): at 17:06

## 2024-01-11 RX ADMIN — FAMOTIDINE 10 MILLIGRAM(S): 10 INJECTION INTRAVENOUS at 11:53

## 2024-01-11 RX ADMIN — Medication 60 MILLIGRAM(S): at 14:36

## 2024-01-11 RX ADMIN — HEPARIN SODIUM 5000 UNIT(S): 5000 INJECTION INTRAVENOUS; SUBCUTANEOUS at 14:35

## 2024-01-11 RX ADMIN — Medication 975 MILLIGRAM(S): at 09:50

## 2024-01-11 RX ADMIN — Medication 5 MILLIGRAM(S): at 21:29

## 2024-01-11 RX ADMIN — HYDROMORPHONE HYDROCHLORIDE 0.25 MILLIGRAM(S): 2 INJECTION INTRAMUSCULAR; INTRAVENOUS; SUBCUTANEOUS at 14:52

## 2024-01-11 RX ADMIN — ATORVASTATIN CALCIUM 10 MILLIGRAM(S): 80 TABLET, FILM COATED ORAL at 21:29

## 2024-01-11 NOTE — PROGRESS NOTE ADULT - SUBJECTIVE AND OBJECTIVE BOX
INFECTIOUS DISEASES CONSULT FOLLOW-UP NOTE    INTERVAL HPI/OVERNIGHT EVENTS:  Patient states he is having vision changes and seeing floaters today. Denies fevers, chills, sob, chest pain, palpitations and abdominal pain.         ANTIBIOTICS/RELEVANT:    MEDICATIONS  (STANDING):  atorvastatin 10 milliGRAM(s) Oral at bedtime  bisacodyl 5 milliGRAM(s) Oral at bedtime  famotidine    Tablet 10 milliGRAM(s) Oral daily  heparin   Injectable 5000 Unit(s) SubCutaneous every 8 hours  hydrALAZINE 25 milliGRAM(s) Oral every 8 hours  isoniazid 300 milliGRAM(s) Oral daily  labetalol 100 milliGRAM(s) Oral three times a day  polyethylene glycol 3350 17 Gram(s) Oral daily  predniSONE   Tablet 60 milliGRAM(s) Oral daily  pyrazinamide 1500 milliGRAM(s) Oral <User Schedule>  pyridoxine 50 milliGRAM(s) Oral daily  rifAMPin 600 milliGRAM(s) Oral daily  senna 2 Tablet(s) Oral at bedtime    MEDICATIONS  (PRN):  acetaminophen     Tablet .. 975 milliGRAM(s) Oral every 6 hours PRN Mild Pain (1 - 3)  oxyCODONE    IR 5 milliGRAM(s) Oral every 6 hours PRN Moderate Pain (4 - 6)        Vital Signs Last 24 Hrs  T(C): 36.2 (11 Jan 2024 09:00), Max: 36.9 (10 Edgardo 2024 22:19)  T(F): 97.2 (11 Jan 2024 09:00), Max: 98.5 (10 Edgardo 2024 22:19)  HR: 102 (11 Jan 2024 12:30) (102 - 128)  BP: 134/72 (11 Jan 2024 12:30) (121/57 - 157/58)  BP(mean): 97 (11 Jan 2024 12:30) (82 - 103)  RR: 16 (11 Jan 2024 12:30) (16 - 18)  SpO2: 97% (11 Jan 2024 12:30) (96% - 99%)    Parameters below as of 11 Jan 2024 12:30  Patient On (Oxygen Delivery Method): room air        01-10-24 @ 07:01  -  01-11-24 @ 07:00  --------------------------------------------------------  IN: 700 mL / OUT: 0 mL / NET: 700 mL    01-11-24 @ 07:01  -  01-11-24 @ 13:47  --------------------------------------------------------  IN: 0 mL / OUT: 150 mL / NET: -150 mL      PHYSICAL EXAM:  Constitutional: non-toxic, no distress  Eyes:LIZY, EOMI  Ear/Nose/Throat: no oral lesion, no sinus tenderness on percussion	  Neck:  supple  Respiratory: CTA jose  Cardiovascular: S1S2 RRR, no murmurs  Gastrointestinal:soft, (+) BS, no HSM  Extremities:no e/e/c  Vascular: DP Pulse:	right normal; left normal        LABS:                        9.5    15.01 )-----------( 405      ( 11 Jan 2024 05:30 )             28.6     01-11    141  |  111<H>  |  42<H>  ----------------------------<  118<H>  4.4   |  15<L>  |  2.43<H>    Ca    11.2<H>      11 Jan 2024 05:30  Phos  3.5     01-11  Mg     2.0     01-11    TPro  7.2  /  Alb  3.4  /  TBili  0.5  /  DBili  x   /  AST  59<H>  /  ALT  43  /  AlkPhos  87  01-11      Urinalysis Basic - ( 11 Jan 2024 05:30 )    Color: x / Appearance: x / SG: x / pH: x  Gluc: 118 mg/dL / Ketone: x  / Bili: x / Urobili: x   Blood: x / Protein: x / Nitrite: x   Leuk Esterase: x / RBC: x / WBC x   Sq Epi: x / Non Sq Epi: x / Bacteria: x        MICROBIOLOGY:      RADIOLOGY & ADDITIONAL STUDIES:  Reviewed INFECTIOUS DISEASES CONSULT FOLLOW-UP NOTE    INTERVAL HPI/OVERNIGHT EVENTS:  Patient states he is having vision changes and seeing floaters today. Denies fevers, chills, sob, chest pain, palpitations and abdominal pain.         ANTIBIOTICS/RELEVANT:    MEDICATIONS  (STANDING):  atorvastatin 10 milliGRAM(s) Oral at bedtime  bisacodyl 5 milliGRAM(s) Oral at bedtime  famotidine    Tablet 10 milliGRAM(s) Oral daily  heparin   Injectable 5000 Unit(s) SubCutaneous every 8 hours  hydrALAZINE 25 milliGRAM(s) Oral every 8 hours  isoniazid 300 milliGRAM(s) Oral daily  labetalol 100 milliGRAM(s) Oral three times a day  polyethylene glycol 3350 17 Gram(s) Oral daily  predniSONE   Tablet 60 milliGRAM(s) Oral daily  pyrazinamide 1500 milliGRAM(s) Oral <User Schedule>  pyridoxine 50 milliGRAM(s) Oral daily  rifAMPin 600 milliGRAM(s) Oral daily  senna 2 Tablet(s) Oral at bedtime    MEDICATIONS  (PRN):  acetaminophen     Tablet .. 975 milliGRAM(s) Oral every 6 hours PRN Mild Pain (1 - 3)  oxyCODONE    IR 5 milliGRAM(s) Oral every 6 hours PRN Moderate Pain (4 - 6)        Vital Signs Last 24 Hrs  T(C): 36.2 (11 Jan 2024 09:00), Max: 36.9 (10 Edgadro 2024 22:19)  T(F): 97.2 (11 Jan 2024 09:00), Max: 98.5 (10 Edgardo 2024 22:19)  HR: 102 (11 Jan 2024 12:30) (102 - 128)  BP: 134/72 (11 Jan 2024 12:30) (121/57 - 157/58)  BP(mean): 97 (11 Jan 2024 12:30) (82 - 103)  RR: 16 (11 Jan 2024 12:30) (16 - 18)  SpO2: 97% (11 Jan 2024 12:30) (96% - 99%)    Parameters below as of 11 Jan 2024 12:30  Patient On (Oxygen Delivery Method): room air        01-10-24 @ 07:01  -  01-11-24 @ 07:00  --------------------------------------------------------  IN: 700 mL / OUT: 0 mL / NET: 700 mL    01-11-24 @ 07:01  -  01-11-24 @ 13:47  --------------------------------------------------------  IN: 0 mL / OUT: 150 mL / NET: -150 mL      PHYSICAL EXAM:  Constitutional: non-toxic, no distress  Eyes:LIZY, EOMI  Ear/Nose/Throat: no oral lesion, no sinus tenderness on percussion	  Neck:  supple  Respiratory: CTA jose  Cardiovascular: S1S2 RRR, no murmurs  Gastrointestinal:soft, (+) BS, no HSM  Extremities:no e/e/c  Vascular: DP Pulse:	right normal; left normal        LABS:                        9.5    15.01 )-----------( 405      ( 11 Jan 2024 05:30 )             28.6     01-11    141  |  111<H>  |  42<H>  ----------------------------<  118<H>  4.4   |  15<L>  |  2.43<H>    Ca    11.2<H>      11 Jan 2024 05:30  Phos  3.5     01-11  Mg     2.0     01-11    TPro  7.2  /  Alb  3.4  /  TBili  0.5  /  DBili  x   /  AST  59<H>  /  ALT  43  /  AlkPhos  87  01-11      Urinalysis Basic - ( 11 Jan 2024 05:30 )    Color: x / Appearance: x / SG: x / pH: x  Gluc: 118 mg/dL / Ketone: x  / Bili: x / Urobili: x   Blood: x / Protein: x / Nitrite: x   Leuk Esterase: x / RBC: x / WBC x   Sq Epi: x / Non Sq Epi: x / Bacteria: x        MICROBIOLOGY:      RADIOLOGY & ADDITIONAL STUDIES:  Reviewed

## 2024-01-11 NOTE — PROGRESS NOTE ADULT - SUBJECTIVE AND OBJECTIVE BOX
[facility] DIVISION OF KIDNEY DISEASES AND HYPERTENSION -- FOLLOW UP NOTE  --------------------------------------------------------------------------------  Seen and evaluated at bedside, no overnight events reported by primary team. No new complaints reported by the pt.  Family member acted as      UOP: 700ml      REVIEW OF SYSTEMS  --------------------------------------------------------------------------------  Gen: No fevers/chills, weakness  Skin: No rashes  Head/Eyes/Ears/Mouth: No headache; No hearing changes, mucous discharge, vision changes  Respiratory: No dyspnea, cough, wheezing  CV: No chest pain, palpitations  GI: No abdominal pain, diarrhea, constipation, nausea, vomiting, melena, hematochezia  : No increased frequency, dysuria, hematuria  MSK: No joint pain/swelling; no back pain; no edema  Neuro: No dizziness/lightheadedness, weakness, seizures, numbness  Heme: No easy bruising or bleeding      Standing Inpatient Medications  atorvastatin 10 milliGRAM(s) Oral at bedtime  bisacodyl 5 milliGRAM(s) Oral at bedtime  famotidine    Tablet 10 milliGRAM(s) Oral daily  heparin   Injectable 5000 Unit(s) SubCutaneous every 8 hours  hydrALAZINE 25 milliGRAM(s) Oral every 8 hours  isoniazid 300 milliGRAM(s) Oral daily  labetalol 100 milliGRAM(s) Oral three times a day  polyethylene glycol 3350 17 Gram(s) Oral daily  predniSONE   Tablet 60 milliGRAM(s) Oral daily  pyrazinamide 1500 milliGRAM(s) Oral <User Schedule>  pyridoxine 50 milliGRAM(s) Oral daily  rifAMPin 600 milliGRAM(s) Oral daily  senna 2 Tablet(s) Oral at bedtime    PRN Inpatient Medications  acetaminophen     Tablet .. 975 milliGRAM(s) Oral every 6 hours PRN  oxyCODONE    IR 5 milliGRAM(s) Oral every 6 hours PRN        VITALS/PHYSICAL EXAM  --------------------------------------------------------------------------------  T(C): 36.2 (01-11-24 @ 09:00), Max: 36.9 (01-10-24 @ 22:19)  HR: 102 (01-11-24 @ 12:30) (102 - 128)  BP: 134/72 (01-11-24 @ 12:30) (121/57 - 157/58)  RR: 16 (01-11-24 @ 12:30) (16 - 18)  SpO2: 97% (01-11-24 @ 12:30) (96% - 99%)  Wt(kg): --        01-10-24 @ 07:01  -  01-11-24 @ 07:00  --------------------------------------------------------  IN: 700 mL / OUT: 0 mL / NET: 700 mL    01-11-24 @ 07:01  -  01-11-24 @ 14:01  --------------------------------------------------------  IN: 0 mL / OUT: 150 mL / NET: -150 mL      Physical Exam:  GENERAL: NAD, sitting in bed   NECK: supple, No JVD  CHEST/LUNG: Clear to auscultation bilaterally  HEART: normal S1S2, RRR  ABDOMEN: Soft, Nontender   EXTREMITIES: No clubbing, cyanosis, or edema   NEUROLOGY: AAO x3, no focal neurological deficit    LABS/STUDIES  --------------------------------------------------------------------------------              9.5    15.01 >-----------<  405      [01-11-24 @ 05:30]              28.6     141  |  111  |  42  ----------------------------<  118      [01-11-24 @ 05:30]  4.4   |  15  |  2.43        Ca     11.2     [01-11-24 @ 05:30]      Mg     2.0     [01-11-24 @ 05:30]      Phos  3.5     [01-11-24 @ 05:30]    TPro  7.2  /  Alb  3.4  /  TBili  0.5  /  DBili  x   /  AST  59  /  ALT  43  /  AlkPhos  87  [01-11-24 @ 05:30]          Creatinine Trend:  SCr 2.43 [01-11 @ 05:30]  SCr 2.16 [01-10 @ 05:30]  SCr 2.05 [01-09 @ 05:30]  SCr 2.23 [01-08 @ 05:30]  SCr 2.13 [01-07 @ 05:30]    Urinalysis - [01-11-24 @ 05:30]      Color  / Appearance  / SG  / pH       Gluc 118 / Ketone   / Bili  / Urobili        Blood  / Protein  / Leuk Est  / Nitrite       RBC  / WBC  / Hyaline  / Gran  / Sq Epi  / Non Sq Epi  / Bacteria     Urine Creatinine 40      [01-06-24 @ 10:24]  Urine Protein 38      [01-08-24 @ 17:39]  Urine Sodium 91      [01-06-24 @ 10:24]  Urine Urea Nitrogen 394      [01-06-24 @ 10:24]  Urine Potassium 32      [01-06-24 @ 10:24]  Urine Osmolality 391      [01-06-24 @ 10:24]    PTH -- (Ca 10.8)      [01-09-24 @ 05:30]   6  Vitamin D (25OH) 33.2      [01-06-24 @ 12:00]  TSH 0.928      [01-08-24 @ 05:30]    HCV 0.08, Nonreact      [01-06-24 @ 06:08]    ANCA: cANCA Negative, pANCA Negative, atypical ANCA Negative      [01-07-24 @ 15:05]  Free Light Chains: kappa 5.78, lambda 6.32, ratio = 0.91      [01-09 @ 05:30]  Immunofixation Urine: No Monoclonal Band Identified      Reference Range: None Detected      [01-08-24 @ 17:39]  UPEP Interpretation: Mild Selective (Glomerular) Proteinuria      [01-08-24 @ 17:39]   [facility] DIVISION OF KIDNEY DISEASES AND HYPERTENSION -- FOLLOW UP NOTE  --------------------------------------------------------------------------------  Seen and evaluated at bedside, no overnight events reported by primary team. No new complaints reported by the pt.  Family member acted as      UOP: 700ml      REVIEW OF SYSTEMS  --------------------------------------------------------------------------------  Gen: No fevers/chills, weakness  Skin: No rashes  Head/Eyes/Ears/Mouth: No headache; No hearing changes, mucous discharge, vision changes  Respiratory: No dyspnea, cough, wheezing  CV: No chest pain, palpitations  GI: No abdominal pain, diarrhea, constipation, nausea, vomiting, melena, hematochezia  : No increased frequency, dysuria, hematuria  MSK: No joint pain/swelling; no back pain; no edema  Neuro: No dizziness/lightheadedness, weakness, seizures, numbness  Heme: No easy bruising or bleeding      Standing Inpatient Medications  atorvastatin 10 milliGRAM(s) Oral at bedtime  bisacodyl 5 milliGRAM(s) Oral at bedtime  famotidine    Tablet 10 milliGRAM(s) Oral daily  heparin   Injectable 5000 Unit(s) SubCutaneous every 8 hours  hydrALAZINE 25 milliGRAM(s) Oral every 8 hours  isoniazid 300 milliGRAM(s) Oral daily  labetalol 100 milliGRAM(s) Oral three times a day  polyethylene glycol 3350 17 Gram(s) Oral daily  predniSONE   Tablet 60 milliGRAM(s) Oral daily  pyrazinamide 1500 milliGRAM(s) Oral <User Schedule>  pyridoxine 50 milliGRAM(s) Oral daily  rifAMPin 600 milliGRAM(s) Oral daily  senna 2 Tablet(s) Oral at bedtime    PRN Inpatient Medications  acetaminophen     Tablet .. 975 milliGRAM(s) Oral every 6 hours PRN  oxyCODONE    IR 5 milliGRAM(s) Oral every 6 hours PRN        VITALS/PHYSICAL EXAM  --------------------------------------------------------------------------------  T(C): 36.2 (01-11-24 @ 09:00), Max: 36.9 (01-10-24 @ 22:19)  HR: 102 (01-11-24 @ 12:30) (102 - 128)  BP: 134/72 (01-11-24 @ 12:30) (121/57 - 157/58)  RR: 16 (01-11-24 @ 12:30) (16 - 18)  SpO2: 97% (01-11-24 @ 12:30) (96% - 99%)  Wt(kg): --        01-10-24 @ 07:01  -  01-11-24 @ 07:00  --------------------------------------------------------  IN: 700 mL / OUT: 0 mL / NET: 700 mL    01-11-24 @ 07:01  -  01-11-24 @ 14:01  --------------------------------------------------------  IN: 0 mL / OUT: 150 mL / NET: -150 mL      Physical Exam:  GENERAL: NAD, sitting in bed   NECK: supple, No JVD  CHEST/LUNG: Clear to auscultation bilaterally  HEART: normal S1S2, RRR  ABDOMEN: Soft, Nontender   EXTREMITIES: No clubbing, cyanosis, or edema   NEUROLOGY: AAO x3, no focal neurological deficit    LABS/STUDIES  --------------------------------------------------------------------------------              9.5    15.01 >-----------<  405      [01-11-24 @ 05:30]              28.6     141  |  111  |  42  ----------------------------<  118      [01-11-24 @ 05:30]  4.4   |  15  |  2.43        Ca     11.2     [01-11-24 @ 05:30]      Mg     2.0     [01-11-24 @ 05:30]      Phos  3.5     [01-11-24 @ 05:30]    TPro  7.2  /  Alb  3.4  /  TBili  0.5  /  DBili  x   /  AST  59  /  ALT  43  /  AlkPhos  87  [01-11-24 @ 05:30]          Creatinine Trend:  SCr 2.43 [01-11 @ 05:30]  SCr 2.16 [01-10 @ 05:30]  SCr 2.05 [01-09 @ 05:30]  SCr 2.23 [01-08 @ 05:30]  SCr 2.13 [01-07 @ 05:30]    Urinalysis - [01-11-24 @ 05:30]      Color  / Appearance  / SG  / pH       Gluc 118 / Ketone   / Bili  / Urobili        Blood  / Protein  / Leuk Est  / Nitrite       RBC  / WBC  / Hyaline  / Gran  / Sq Epi  / Non Sq Epi  / Bacteria     Urine Creatinine 40      [01-06-24 @ 10:24]  Urine Protein 38      [01-08-24 @ 17:39]  Urine Sodium 91      [01-06-24 @ 10:24]  Urine Urea Nitrogen 394      [01-06-24 @ 10:24]  Urine Potassium 32      [01-06-24 @ 10:24]  Urine Osmolality 391      [01-06-24 @ 10:24]    PTH -- (Ca 10.8)      [01-09-24 @ 05:30]   6  Vitamin D (25OH) 33.2      [01-06-24 @ 12:00]  TSH 0.928      [01-08-24 @ 05:30]    HCV 0.08, Nonreact      [01-06-24 @ 06:08]    ANCA: cANCA Negative, pANCA Negative, atypical ANCA Negative      [01-07-24 @ 15:05]  Free Light Chains: kappa 5.78, lambda 6.32, ratio = 0.91      [01-09 @ 05:30]  Immunofixation Urine: No Monoclonal Band Identified      Reference Range: None Detected      [01-08-24 @ 17:39]  UPEP Interpretation: Mild Selective (Glomerular) Proteinuria      [01-08-24 @ 17:39]

## 2024-01-11 NOTE — PROGRESS NOTE ADULT - SUBJECTIVE AND OBJECTIVE BOX
Patient discussed on morning rounds with Dr. Arthur    OPERATION & DATE: 1/5/23 R VATS, RA pleural biopsy and decortication (frozen + necrotizing granuloma)    SUBJECTIVE ASSESSMENT:  Assessed at bedside with son to interpret. Complains of some anterior chest pain and voice hoarseness. Denies SOB. Denies fever, chills, nausea, vomiting.     VITAL SIGNS:  Vital Signs Last 24 Hrs  T(C): 36.3 (11 Jan 2024 14:24), Max: 36.9 (10 Edgardo 2024 22:19)  T(F): 97.4 (11 Jan 2024 14:24), Max: 98.5 (10 Edgardo 2024 22:19)  HR: 102 (11 Jan 2024 12:30) (102 - 128)  BP: 134/72 (11 Jan 2024 12:30) (121/57 - 157/58)  BP(mean): 97 (11 Jan 2024 12:30) (82 - 103)  RR: 16 (11 Jan 2024 12:30) (16 - 18)  SpO2: 97% (11 Jan 2024 12:30) (96% - 99%)    Parameters below as of 11 Jan 2024 12:30  Patient On (Oxygen Delivery Method): room air      I&O's Detail    10 Edgardo 2024 07:01  -  11 Jan 2024 07:00  --------------------------------------------------------  IN:    Oral Fluid: 700 mL  Total IN: 700 mL    OUT:    Chest Tube (mL): 0 mL  Total OUT: 0 mL    Total NET: 700 mL      11 Jan 2024 07:01  -  11 Jan 2024 17:05  --------------------------------------------------------  IN:  Total IN: 0 mL    OUT:    Voided (mL): 150 mL  Total OUT: 150 mL    Total NET: -150 mL      CHEST TUBE:  Yes X1, + air leak  DEON DRAIN:  No  EPICARDIAL WIRES: No   STITCHES: Yes  STAPLES: No  TRUJILLO:  No  CENTRAL LINE: No  MIDLINE/PICC: No  WOUND VAC: No    Physical Exam  CONSTITUTIONAL: Well appearing in NAD assessed laying comfortably in bed   NEURO: A&OX3. No focal deficits noted, moving bilateral upper and lower extremities                    CV: RRR, no murmurs, rubs, gallops  RESPIRATORY: Clear to auscultation bilateral posterior lung fields, no wheezes, rales, rhonchi   GI: +BS, NT/ND  MUSKULOSKELETAL: No peripheral edema or calf tenderness. Full strength and ROM bilateral upper and lower extremities   VASCULAR: Bilateral distal pulses 2+  INCISIONS: R VATS incisions clean, dry, intact     LABS:                        9.5    15.01 )-----------( 405      ( 11 Jan 2024 05:30 )             28.6       01-11    141  |  111<H>  |  42<H>  ----------------------------<  118<H>  4.4   |  15<L>  |  2.43<H>    Ca    11.2<H>      11 Jan 2024 05:30  Phos  3.5     01-11  Mg     2.0     01-11    TPro  7.2  /  Alb  3.4  /  TBili  0.5  /  DBili  x   /  AST  59<H>  /  ALT  43  /  AlkPhos  87  01-11    Urinalysis Basic - ( 11 Jan 2024 05:30 )    Color: x / Appearance: x / SG: x / pH: x  Gluc: 118 mg/dL / Ketone: x  / Bili: x / Urobili: x   Blood: x / Protein: x / Nitrite: x   Leuk Esterase: x / RBC: x / WBC x   Sq Epi: x / Non Sq Epi: x / Bacteria: x      MEDICATIONS  (STANDING):  atorvastatin 10 milliGRAM(s) Oral at bedtime  bisacodyl 5 milliGRAM(s) Oral at bedtime  famotidine    Tablet 10 milliGRAM(s) Oral daily  heparin   Injectable 5000 Unit(s) SubCutaneous every 8 hours  hydrALAZINE 25 milliGRAM(s) Oral every 8 hours  isoniazid 300 milliGRAM(s) Oral daily  labetalol 100 milliGRAM(s) Oral three times a day  polyethylene glycol 3350 17 Gram(s) Oral daily  predniSONE   Tablet 60 milliGRAM(s) Oral daily  pyrazinamide 1500 milliGRAM(s) Oral <User Schedule>  pyridoxine 50 milliGRAM(s) Oral daily  rifAMPin 600 milliGRAM(s) Oral daily  senna 2 Tablet(s) Oral at bedtime    MEDICATIONS  (PRN):  acetaminophen     Tablet .. 975 milliGRAM(s) Oral every 6 hours PRN Mild Pain (1 - 3)  oxyCODONE    IR 5 milliGRAM(s) Oral every 6 hours PRN Moderate Pain (4 - 6)    RADIOLOGY & ADDITIONAL TESTS:       Patient discussed on morning rounds with Dr. Arthur    OPERATION & DATE: 1/5/23 R VATS, RA pleural biopsy and decortication (frozen + necrotizing granuloma)    SUBJECTIVE ASSESSMENT:  Assessed at bedside with son to interpret. Complains of some anterior chest pain and voice hoarseness. Denies SOB. Denies fever, chills, nausea, vomiting.     VITAL SIGNS:  Vital Signs Last 24 Hrs  T(C): 36.3 (11 Jan 2024 14:24), Max: 36.9 (10 Edgardo 2024 22:19)  T(F): 97.4 (11 Jan 2024 14:24), Max: 98.5 (10 Edgardo 2024 22:19)  HR: 102 (11 Jan 2024 12:30) (102 - 128)  BP: 134/72 (11 Jan 2024 12:30) (121/57 - 157/58)  BP(mean): 97 (11 Jan 2024 12:30) (82 - 103)  RR: 16 (11 Jan 2024 12:30) (16 - 18)  SpO2: 97% (11 Jan 2024 12:30) (96% - 99%)    Parameters below as of 11 Jan 2024 12:30  Patient On (Oxygen Delivery Method): room air      I&O's Detail    10 Edgardo 2024 07:01  -  11 Jan 2024 07:00  --------------------------------------------------------  IN:    Oral Fluid: 700 mL  Total IN: 700 mL    OUT:    Chest Tube (mL): 0 mL  Total OUT: 0 mL    Total NET: 700 mL      11 Jan 2024 07:01  -  11 Jan 2024 17:05  --------------------------------------------------------  IN:  Total IN: 0 mL    OUT:    Voided (mL): 150 mL  Total OUT: 150 mL    Total NET: -150 mL      CHEST TUBE:  Yes X1, + air leak  DEON DRAIN:  No  EPICARDIAL WIRES: No   STITCHES: Yes  STAPLES: No  TRUJILLO:  No  CENTRAL LINE: No  MIDLINE/PICC: No  WOUND VAC: No    Physical Exam  CONSTITUTIONAL: Well appearing in NAD assessed laying comfortably in bed   NEURO: A&OX3. No focal deficits noted, moving bilateral upper and lower extremities                    CV: RRR, no murmurs, rubs, gallops  RESPIRATORY: Clear to auscultation bilateral posterior lung fields, no wheezes, rales, rhonchi. Significant SQ emphysema extending over right chest, neck, cheeks   GI: +BS, NT/ND  MUSKULOSKELETAL: No peripheral edema or calf tenderness. Full strength and ROM bilateral upper and lower extremities   VASCULAR: Bilateral distal pulses 2+  INCISIONS: R VATS incisions clean, dry, intact    LABS:                        9.5    15.01 )-----------( 405      ( 11 Jan 2024 05:30 )             28.6       01-11    141  |  111<H>  |  42<H>  ----------------------------<  118<H>  4.4   |  15<L>  |  2.43<H>    Ca    11.2<H>      11 Jan 2024 05:30  Phos  3.5     01-11  Mg     2.0     01-11    TPro  7.2  /  Alb  3.4  /  TBili  0.5  /  DBili  x   /  AST  59<H>  /  ALT  43  /  AlkPhos  87  01-11    Urinalysis Basic - ( 11 Jan 2024 05:30 )    Color: x / Appearance: x / SG: x / pH: x  Gluc: 118 mg/dL / Ketone: x  / Bili: x / Urobili: x   Blood: x / Protein: x / Nitrite: x   Leuk Esterase: x / RBC: x / WBC x   Sq Epi: x / Non Sq Epi: x / Bacteria: x      MEDICATIONS  (STANDING):  atorvastatin 10 milliGRAM(s) Oral at bedtime  bisacodyl 5 milliGRAM(s) Oral at bedtime  famotidine    Tablet 10 milliGRAM(s) Oral daily  heparin   Injectable 5000 Unit(s) SubCutaneous every 8 hours  hydrALAZINE 25 milliGRAM(s) Oral every 8 hours  isoniazid 300 milliGRAM(s) Oral daily  labetalol 100 milliGRAM(s) Oral three times a day  polyethylene glycol 3350 17 Gram(s) Oral daily  predniSONE   Tablet 60 milliGRAM(s) Oral daily  pyrazinamide 1500 milliGRAM(s) Oral <User Schedule>  pyridoxine 50 milliGRAM(s) Oral daily  rifAMPin 600 milliGRAM(s) Oral daily  senna 2 Tablet(s) Oral at bedtime    MEDICATIONS  (PRN):  acetaminophen     Tablet .. 975 milliGRAM(s) Oral every 6 hours PRN Mild Pain (1 - 3)  oxyCODONE    IR 5 milliGRAM(s) Oral every 6 hours PRN Moderate Pain (4 - 6)    RADIOLOGY & ADDITIONAL TESTS:    < from: Xray Chest 1 View-PORTABLE IMMEDIATE (Xray Chest 1 View-PORTABLE IMMEDIATE .) (01.11.24 @ 14:56) >  FINDINGS: Interval placement of a right-sided pleural pigtail catheter   which overlies the peripheral right mid zone. Prior right pneumothorax   has significantly decreased. Suggestion of a small residual right basal   pneumothorax. No visualized left pneumothorax. No pleural effusion.   Extensive bilateral unchanged subcutaneous emphysema. Size of cardiac   silhouette unchanged. Pneumomediastinum unchanged. Lungs unchanged.    IMPRESSION:  Interval placement of a right chest pleural pigtail catheter.    < end of copied text >

## 2024-01-11 NOTE — PROGRESS NOTE ADULT - ASSESSMENT
73 year old M, Cantonese speaking, never a smoker, with PMHx of HTN, HLD, CKD3,BPH,  Gout, hx Shingles right arm ( 10/2023), hx RLQ pain since ( 8/2023) along with poor appetite, unintentional weight loss around 20 lbs, and progressively worse hoarseness and dyspnea. recent EGD and colonoscopy unremarkable. Pt was referred by Heme Onc Dr. Edward Jensen for an initial evaluation of right pleural effusion. Pt underwent 	R VATS RA Pleural biopsy and decortication (Frozen + Necrotizing granuloma) 1/5/24- pleural AFB positive, sputum AFB smear negative x 3 ( culture pending ) on RIBE + B 6   pod # 6    - 9LA for HD monitoring   - airborne isolation to rule out TB  - right pneumothorax/sub-cutaneous emphysema- as dw CTS, on 100 %NRM, considering pigtail/chest tube -management as per CTS, wife and patient explained and aware about the situation and agree for CT/pigtail as needed.    - ID f/u appreciated, Team 1 Dr. Alcala/ discussed.  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52( 1/6)-> 2.13( 1/7)-> 2.23( 1/8)-> 2.05(1/9) , UA noted w. mild proteinuria  - ANCA negative    - Hypercalcemia: could be related to MTB. f/u Hypercalcemia workupas delineated per Renal - avoid calcium supplement and vitamin D , adequate hydration.   -  trend BMP for S Cr and avoid nephrotoxic meds/NSAIDS/iv contrast dye     - pain control   - acetaminophen     Tablet .. 975 milliGRAM(s) Oral every 6 hours PRN  - oxyCODONE    IR 5 milliGRAM(s) Oral every 6 hours PRN  - bowel regimen:   - bisacodyl 5 milliGRAM(s) Oral at bedtime  - polyethylene glycol 3350 17 Gram(s) Oral daily  - senna 2 Tablet(s) Oral at bedtime  - HTN:   - currently on Hydralazine 25 mg q 8 hourly  - labetalol 100 tid     - HLD: atorvastatin 10 milliGRAM(s) Oral at bedtime  - GI ppx: pantoprazole    Tablet 40 milliGRAM(s) Oral before breakfast  - DVT ppx: heparin   Injectable 5000 Unit(s) SubCutaneous every 8 hours    Contacts:   Referring Heme Onc Dr Joanne Jensen     POC as d/w family and CTS PA, wife    pt to follow up with [facility] and pmd.           73 year old M, Cantonese speaking, never a smoker, with PMHx of HTN, HLD, CKD3,BPH,  Gout, hx Shingles right arm ( 10/2023), hx RLQ pain since ( 8/2023) along with poor appetite, unintentional weight loss around 20 lbs, and progressively worse hoarseness and dyspnea. recent EGD and colonoscopy unremarkable. Pt was referred by Heme Onc Dr. Edward Jensen for an initial evaluation of right pleural effusion. Pt underwent 	R VATS RA Pleural biopsy and decortication (Frozen + Necrotizing granuloma) 1/5/24- pleural AFB positive, sputum AFB smear negative x 3 ( culture pending ) on RIBE + B 6   pod # 6    - 9LA for HD monitoring   - airborne isolation to rule out TB  - right pneumothorax/sub-cutaneous emphysema- as dw CTS, on 100 %NRM, considering pigtail/chest tube -management as per CTS, wife and patient explained and aware about the situation and agree for CT/pigtail as needed.    - ID f/u appreciated, Team 1 Dr. Alcala/ discussed.  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     - started on RIPE Rx +Vitamin B6 50mg po daily   - Continue Rifampin 600 mg PO daily  - Continue  mg PO daily  - Continue Ethambutol 1200 mg PO three times a week (M, W, F)  - Continue pyrazinamide 1500 mg PO three times a week (M, W, F)  -  Continue vitamin B6 50 mg PO daily  - Will need Op exam for baseline Op exam since on Ethambutol  - ID contacted  [facility]-  [facility] called and discussed with wife 1/10/24- ( not contagious to others)    - Renal f/u appreciated, for NARA on CKD3 (baseline Cr 1.8) : S Cr baseline 1.91( 12/15/23)-> 2.71 ( 1/5)-> 2.52( 1/6)-> 2.13( 1/7)-> 2.23( 1/8)-> 2.05(1/9) , UA noted w. mild proteinuria  - ANCA negative    - Hypercalcemia: could be related to MTB. f/u Hypercalcemia workupas delineated per Renal - avoid calcium supplement and vitamin D , adequate hydration.   -  trend BMP for S Cr and avoid nephrotoxic meds/NSAIDS/iv contrast dye     - pain control   - acetaminophen     Tablet .. 975 milliGRAM(s) Oral every 6 hours PRN  - oxyCODONE    IR 5 milliGRAM(s) Oral every 6 hours PRN  - bowel regimen:   - bisacodyl 5 milliGRAM(s) Oral at bedtime  - polyethylene glycol 3350 17 Gram(s) Oral daily  - senna 2 Tablet(s) Oral at bedtime  - HTN:   - currently on Hydralazine 25 mg q 8 hourly  - labetalol 100 tid     - HLD: atorvastatin 10 milliGRAM(s) Oral at bedtime  - GI ppx: pantoprazole    Tablet 40 milliGRAM(s) Oral before breakfast  - DVT ppx: heparin   Injectable 5000 Unit(s) SubCutaneous every 8 hours    Contacts:   Referring Heme Onc Dr Joanne Jensen     POC as d/w family and CTS PA, wife    pt to follow up with AARON and pmd.           73 year old M, Cantonese speaking, never a smoker, with PMHx of HTN, HLD, CKD3,BPH,  Gout, hx Shingles right arm ( 10/2023), hx RLQ pain since ( 8/2023) along with poor appetite, unintentional weight loss around 20 lbs, and progressively worse hoarseness and dyspnea. recent EGD and colonoscopy unremarkable. Pt was referred by Heme Onc Dr. Edward Jensen for an initial evaluation of right pleural effusion. Pt underwent 	R VATS RA Pleural biopsy and decortication (Frozen + Necrotizing granuloma) 1/5/24- pleural AFB positive, sputum AFB smear negative x 3 ( culture pending ) on RIBE + B 6   pod # 6    - 9LA for HD monitoring   - airborne isolation to rule out TB  - right pneumothorax/sub-cutaneous emphysema- as dw CTS, on 100 %NRM, considering pigtail/chest tube -management as per CTS, wife and patient explained and aware about the situation and agree for CT/pigtail as needed.    - ID f/u appreciated, Team 1 Dr. Alcala/ discussed.  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  "1.  Continue Rifampin 600 mg PO daily  2.  Continue  mg PO daily  3.  Stop Ethambutol 1200 mg PO three times a week (M, W, F). Patient is having vision changes and seeing floaters now, which is known side effect of Ethambutol.  4.  Start Levaquin 750mg PO q 48 hours  5.  Continue pyrazinamide 1500 mg PO three times a week (M, W, F)  6.  Continue vitamin B6 50 mg PO daily"    - ID contacted  [facility]-  [facility] called and discussed with wife 1/10/24- ( not contagious to others) to follow up with A[facility]ON    - Renal f/u appreciated, for NARA on CKD3 (baseline Cr 1.8) : S Cr baseline 1.91( 12/15/23)-> 2.71 ( 1/5)-> 2.52( 1/6)-> 2.13( 1/7)-> 2.23( 1/8)-> 2.05(1/9) , UA noted w. mild proteinuria  - ANCA negative    - Hypercalcemia: could be related to MTB. f/u Hypercalcemia workupas delineated per Renal - avoid calcium supplement and vitamin D , adequate hydration.   -  trend BMP for S Cr and avoid nephrotoxic meds/NSAIDS/iv contrast dye     - pain control   - acetaminophen     Tablet .. 975 milliGRAM(s) Oral every 6 hours PRN  - oxyCODONE    IR 5 milliGRAM(s) Oral every 6 hours PRN  - bowel regimen:   - bisacodyl 5 milliGRAM(s) Oral at bedtime  - polyethylene glycol 3350 17 Gram(s) Oral daily  - senna 2 Tablet(s) Oral at bedtime  - HTN:   - currently on Hydralazine 25 mg q 8 hourly  - labetalol 100 tid     - HLD: atorvastatin 10 milliGRAM(s) Oral at bedtime  - GI ppx: pantoprazole    Tablet 40 milliGRAM(s) Oral before breakfast  - DVT ppx: heparin   Injectable 5000 Unit(s) SubCutaneous every 8 hours    Contacts:   Referring Heme Onc Dr Joanne Jensen     POC as d/w family and CTS PA, wife    pt to follow up with AARON and pmd.           73 year old M, Cantonese speaking, never a smoker, with PMHx of HTN, HLD, CKD3,BPH,  Gout, hx Shingles right arm ( 10/2023), hx RLQ pain since ( 8/2023) along with poor appetite, unintentional weight loss around 20 lbs, and progressively worse hoarseness and dyspnea. recent EGD and colonoscopy unremarkable. Pt was referred by Heme Onc Dr. Edward Jensen for an initial evaluation of right pleural effusion. Pt underwent 	R VATS RA Pleural biopsy and decortication (Frozen + Necrotizing granuloma) 1/5/24- pleural AFB positive, sputum AFB smear negative x 3 ( culture pending ) on RIBE + B 6   pod # 6    - 9LA for HD monitoring   - airborne isolation to rule out TB  - right pneumothorax/sub-cutaneous emphysema- as dw CTS, on 100 %NRM, considering pigtail/chest tube -management as per CTS, wife and patient explained and aware about the situation and agree for CT/pigtail as needed.    - ID f/u appreciated, Team 1 Dr. Alcala/ discussed.  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  "1.  Continue Rifampin 600 mg PO daily  2.  Continue  mg PO daily  3.  Stop Ethambutol 1200 mg PO three times a week (M, W, F). Patient is having vision changes and seeing floaters now, which is known side effect of Ethambutol.  4.  Start Levaquin 750mg PO q 48 hours  5.  Continue pyrazinamide 1500 mg PO three times a week (M, W, F)  6.  Continue vitamin B6 50 mg PO daily"    - ID contacted  [facility]-  [facility] called and discussed with wife 1/10/24- ( not contagious to others) to follow up with AARON    - Renal f/u appreciated, for NARA on CKD3 (baseline Cr 1.8) : S Cr baseline 1.91( 12/15/23)-> 2.71 ( 1/5)-> 2.52( 1/6)-> 2.13( 1/7)-> 2.23( 1/8)-> 2.05(1/9) , UA noted w. mild proteinuria  - ANCA negative    - Hypercalcemia: could be related to MTB. f/u Hypercalcemia workupas delineated per Renal - avoid calcium supplement and vitamin D , adequate hydration.   -  trend BMP for S Cr and avoid nephrotoxic meds/NSAIDS/iv contrast dye     - pain control   - acetaminophen     Tablet .. 975 milliGRAM(s) Oral every 6 hours PRN  - oxyCODONE    IR 5 milliGRAM(s) Oral every 6 hours PRN  - bowel regimen:   - bisacodyl 5 milliGRAM(s) Oral at bedtime  - polyethylene glycol 3350 17 Gram(s) Oral daily  - senna 2 Tablet(s) Oral at bedtime  - HTN:   - currently on Hydralazine 25 mg q 8 hourly  - labetalol 100 tid     - HLD: atorvastatin 10 milliGRAM(s) Oral at bedtime  - GI ppx: pantoprazole    Tablet 40 milliGRAM(s) Oral before breakfast  - DVT ppx: heparin   Injectable 5000 Unit(s) SubCutaneous every 8 hours    Contacts:   Referring Heme Onc Dr Joanne Jensen     POC as d/w family and CTS PA, wife    pt to follow up with AARON and pmd.

## 2024-01-11 NOTE — PROGRESS NOTE ADULT - SUBJECTIVE AND OBJECTIVE BOX
GUILLAUME MANNING , [id number],  [facility] [address]LA [address]29 01    Time of encounter :    CC :    T(C): 36.2 (01-11-24 @ 09:00), Max: 36.9 (01-10-24 @ 22:19)  HR: 120 (01-11-24 @ 09:00) (96 - 128)  BP: 157/58 (01-11-24 @ 09:00) (121/57 - 157/58)  RR: 17 (01-11-24 @ 09:00) (17 - 18)  SpO2: 98% (01-11-24 @ 09:00) (96% - 99%)    acetaminophen     Tablet .. 975 milliGRAM(s) Oral every 6 hours PRN  atorvastatin 10 milliGRAM(s) Oral at bedtime  bisacodyl 5 milliGRAM(s) Oral at bedtime  ethambutol 1200 milliGRAM(s) Oral <User Schedule>  famotidine    Tablet 10 milliGRAM(s) Oral daily  heparin   Injectable 5000 Unit(s) SubCutaneous every 8 hours  hydrALAZINE 25 milliGRAM(s) Oral every 8 hours  isoniazid 300 milliGRAM(s) Oral daily  labetalol 100 milliGRAM(s) Oral three times a day  oxyCODONE    IR 5 milliGRAM(s) Oral every 6 hours PRN  polyethylene glycol 3350 17 Gram(s) Oral daily  pyrazinamide 1500 milliGRAM(s) Oral <User Schedule>  pyridoxine 50 milliGRAM(s) Oral daily  rifAMPin 600 milliGRAM(s) Oral daily  senna 2 Tablet(s) Oral at bedtime      Physical Exam :    General exam :  well built, not in distress, saturating well on room air.  Eyes : EOMI, PERRL   CVS : RRR no murmur, JVP not elevated  Lungs : good air entry bilaterally   Abdomen : BS present, soft, not tender, not distended.  Extremities: no edema, no tenderness.  Neuro : AAO x 3 non focal.  Skin : warm and dry.   :  no teryr.      CBC Full  -  ( 11 Jan 2024 05:30 )  WBC Count : 15.01 K/uL  RBC Count : 3.18 M/uL  Hemoglobin : 9.5 g/dL  Hematocrit : 28.6 %  Platelet Count - Automated : 405 K/uL  Mean Cell Volume : 89.9 fl  Mean Cell Hemoglobin : 29.9 pg  Mean Cell Hemoglobin Concentration : 33.2 gm/dL  Auto Neutrophil # : 11.06 K/uL  Auto Lymphocyte # : 2.38 K/uL  Auto Monocyte # : 0.87 K/uL  Auto Eosinophil # : 0.52 K/uL  Auto Basophil # : 0.05 K/uL  Auto Neutrophil % : 73.6 %  Auto Lymphocyte % : 15.9 %  Auto Monocyte % : 5.8 %  Auto Eosinophil % : 3.5 %  Auto Basophil % : 0.3 %        01-11    141  |  111<H>  |  42<H>  ----------------------------<  118<H>  4.4   |  15<L>  |  2.43<H>    Ca    11.2<H>      11 Jan 2024 05:30  Phos  3.5     01-11  Mg     2.0     01-11    TPro  7.2  /  Alb  3.4  /  TBili  0.5  /  DBili  x   /  AST  59<H>  /  ALT  43  /  AlkPhos  87  01-11    Daily     Daily   CAPILLARY BLOOD GLUCOSE          Culture - Acid Fast - Sputum w/Smear (collected 08 Jan 2024 17:17)  Source: .Sputum Sputum  Preliminary Report (10 Edgadro 2024 15:09):    Culture is being performed.    Culture - Acid Fast - Sputum w/Smear (collected 08 Jan 2024 10:45)  Source: .Sputum Sputum  Preliminary Report (10 Edgardo 2024 15:08):    Culture is being performed.      Urinalysis Basic - ( 11 Jan 2024 05:30 )    Color: x / Appearance: x / SG: x / pH: x  Gluc: 118 mg/dL / Ketone: x  / Bili: x / Urobili: x   Blood: x / Protein: x / Nitrite: x   Leuk Esterase: x / RBC: x / WBC x   Sq Epi: x / Non Sq Epi: x / Bacteria: x                   GUILLAUME MANNING , [id number],  [facility] [address]LA [address] 01    Time of encounter :    CC :    T(C): 36.2 (01-11-24 @ 09:00), Max: 36.9 (01-10-24 @ 22:19)  HR: 120 (01-11-24 @ 09:00) (96 - 128)  BP: 157/58 (01-11-24 @ 09:00) (121/57 - 157/58)  RR: 17 (01-11-24 @ 09:00) (17 - 18)  SpO2: 98% (01-11-24 @ 09:00) (96% - 99%)    acetaminophen     Tablet .. 975 milliGRAM(s) Oral every 6 hours PRN  atorvastatin 10 milliGRAM(s) Oral at bedtime  bisacodyl 5 milliGRAM(s) Oral at bedtime  ethambutol 1200 milliGRAM(s) Oral <User Schedule>  famotidine    Tablet 10 milliGRAM(s) Oral daily  heparin   Injectable 5000 Unit(s) SubCutaneous every 8 hours  hydrALAZINE 25 milliGRAM(s) Oral every 8 hours  isoniazid 300 milliGRAM(s) Oral daily  labetalol 100 milliGRAM(s) Oral three times a day  oxyCODONE    IR 5 milliGRAM(s) Oral every 6 hours PRN  polyethylene glycol 3350 17 Gram(s) Oral daily  pyrazinamide 1500 milliGRAM(s) Oral <User Schedule>  pyridoxine 50 milliGRAM(s) Oral daily  rifAMPin 600 milliGRAM(s) Oral daily  senna 2 Tablet(s) Oral at bedtime      Physical Exam :    General exam :  well built, not in distress, saturating well on room air.  Eyes : EOMI, PERRL   CVS : RRR no murmur, JVP not elevated  Lungs : good air entry bilaterally   Abdomen : BS present, soft, not tender, not distended.  Extremities: no edema, no tenderness.  Neuro : AAO x 3 non focal.  Skin : warm and dry.   :  no terry.      CBC Full  -  ( 11 Jan 2024 05:30 )  WBC Count : 15.01 K/uL  RBC Count : 3.18 M/uL  Hemoglobin : 9.5 g/dL  Hematocrit : 28.6 %  Platelet Count - Automated : 405 K/uL  Mean Cell Volume : 89.9 fl  Mean Cell Hemoglobin : 29.9 pg  Mean Cell Hemoglobin Concentration : 33.2 gm/dL  Auto Neutrophil # : 11.06 K/uL  Auto Lymphocyte # : 2.38 K/uL  Auto Monocyte # : 0.87 K/uL  Auto Eosinophil # : 0.52 K/uL  Auto Basophil # : 0.05 K/uL  Auto Neutrophil % : 73.6 %  Auto Lymphocyte % : 15.9 %  Auto Monocyte % : 5.8 %  Auto Eosinophil % : 3.5 %  Auto Basophil % : 0.3 %        01-11    141  |  111<H>  |  42<H>  ----------------------------<  118<H>  4.4   |  15<L>  |  2.43<H>    Ca    11.2<H>      11 Jan 2024 05:30  Phos  3.5     01-11  Mg     2.0     01-11    TPro  7.2  /  Alb  3.4  /  TBili  0.5  /  DBili  x   /  AST  59<H>  /  ALT  43  /  AlkPhos  87  01-11    Daily     Daily   CAPILLARY BLOOD GLUCOSE          Culture - Acid Fast - Sputum w/Smear (collected 08 Jan 2024 17:17)  Source: .Sputum Sputum  Preliminary Report (10 Edgardo 2024 15:09):    Culture is being performed.    Culture - Acid Fast - Sputum w/Smear (collected 08 Jan 2024 10:45)  Source: .Sputum Sputum  Preliminary Report (10 Edgardo 2024 15:08):    Culture is being performed.      Urinalysis Basic - ( 11 Jan 2024 05:30 )    Color: x / Appearance: x / SG: x / pH: x  Gluc: 118 mg/dL / Ketone: x  / Bili: x / Urobili: x   Blood: x / Protein: x / Nitrite: x   Leuk Esterase: x / RBC: x / WBC x   Sq Epi: x / Non Sq Epi: x / Bacteria: x                   GUILLAUME MANNING , [id number],  [facility] [address]LA [address] 01    Time of encounter : 10:20 am   seen sitting on bedside chair wearing 100 % NRM   wife at bedside.   reported mild pain at the right chest area.         T(C): 36.2 (01-11-24 @ 09:00), Max: 36.9 (01-10-24 @ 22:19)  HR: 120 (01-11-24 @ 09:00) (96 - 128)  BP: 157/58 (01-11-24 @ 09:00) (121/57 - 157/58)  RR: 17 (01-11-24 @ 09:00) (17 - 18)  SpO2: 98% (01-11-24 @ 09:00) (96% - 99%)    acetaminophen     Tablet .. 975 milliGRAM(s) Oral every 6 hours PRN  atorvastatin 10 milliGRAM(s) Oral at bedtime  bisacodyl 5 milliGRAM(s) Oral at bedtime  ethambutol 1200 milliGRAM(s) Oral <User Schedule>  famotidine    Tablet 10 milliGRAM(s) Oral daily  heparin   Injectable 5000 Unit(s) SubCutaneous every 8 hours  hydrALAZINE 25 milliGRAM(s) Oral every 8 hours  isoniazid 300 milliGRAM(s) Oral daily  labetalol 100 milliGRAM(s) Oral three times a day  oxyCODONE    IR 5 milliGRAM(s) Oral every 6 hours PRN  polyethylene glycol 3350 17 Gram(s) Oral daily  pyrazinamide 1500 milliGRAM(s) Oral <User Schedule>  pyridoxine 50 milliGRAM(s) Oral daily  rifAMPin 600 milliGRAM(s) Oral daily  senna 2 Tablet(s) Oral at bedtime      Physical Exam :    General exam :  awake, alert , on 100 % NRM  palpable sub-cutaneous emphysema on the right chest wall, to back and neck.  RRR  right subcutaneous emphysema, reduced air entry on right, moving good air on left  no edema noted on bilateral lower ext  neuro - non focal.       CBC Full  -  ( 11 Jan 2024 05:30 )  WBC Count : 15.01 K/uL  RBC Count : 3.18 M/uL  Hemoglobin : 9.5 g/dL  Hematocrit : 28.6 %  Platelet Count - Automated : 405 K/uL  Mean Cell Volume : 89.9 fl  Mean Cell Hemoglobin : 29.9 pg  Mean Cell Hemoglobin Concentration : 33.2 gm/dL  Auto Neutrophil # : 11.06 K/uL  Auto Lymphocyte # : 2.38 K/uL  Auto Monocyte # : 0.87 K/uL  Auto Eosinophil # : 0.52 K/uL  Auto Basophil # : 0.05 K/uL  Auto Neutrophil % : 73.6 %  Auto Lymphocyte % : 15.9 %  Auto Monocyte % : 5.8 %  Auto Eosinophil % : 3.5 %  Auto Basophil % : 0.3 %        01-11    141  |  111<H>  |  42<H>  ----------------------------<  118<H>  4.4   |  15<L>  |  2.43<H>    Ca    11.2<H>      11 Jan 2024 05:30  Phos  3.5     01-11  Mg     2.0     01-11    TPro  7.2  /  Alb  3.4  /  TBili  0.5  /  DBili  x   /  AST  59<H>  /  ALT  43  /  AlkPhos  87  01-11    Daily     Daily   CAPILLARY BLOOD GLUCOSE          Culture - Acid Fast - Sputum w/Smear (collected 08 Jan 2024 17:17)  Source: .Sputum Sputum  Preliminary Report (10 Edgardo 2024 15:09):    Culture is being performed.    Culture - Acid Fast - Sputum w/Smear (collected 08 Jan 2024 10:45)  Source: .Sputum Sputum  Preliminary Report (10 Edgardo 2024 15:08):    Culture is being performed.      Urinalysis Basic - ( 11 Jan 2024 05:30 )    Color: x / Appearance: x / SG: x / pH: x  Gluc: 118 mg/dL / Ketone: x  / Bili: x / Urobili: x   Blood: x / Protein: x / Nitrite: x   Leuk Esterase: x / RBC: x / WBC x   Sq Epi: x / Non Sq Epi: x / Bacteria: x                   GUILLAUME MANNING , [id number],  [facility] [address]LA [address] 01    Time of encounter : 10:20 am   seen sitting on bedside chair wearing 100 % NRM   wife at bedside.   reported mild pain at the right chest area.         T(C): 36.2 (01-11-24 @ 09:00), Max: 36.9 (01-10-24 @ 22:19)  HR: 120 (01-11-24 @ 09:00) (96 - 128)  BP: 157/58 (01-11-24 @ 09:00) (121/57 - 157/58)  RR: 17 (01-11-24 @ 09:00) (17 - 18)  SpO2: 98% (01-11-24 @ 09:00) (96% - 99%)    acetaminophen     Tablet .. 975 milliGRAM(s) Oral every 6 hours PRN  atorvastatin 10 milliGRAM(s) Oral at bedtime  bisacodyl 5 milliGRAM(s) Oral at bedtime  ethambutol 1200 milliGRAM(s) Oral <User Schedule>  famotidine    Tablet 10 milliGRAM(s) Oral daily  heparin   Injectable 5000 Unit(s) SubCutaneous every 8 hours  hydrALAZINE 25 milliGRAM(s) Oral every 8 hours  isoniazid 300 milliGRAM(s) Oral daily  labetalol 100 milliGRAM(s) Oral three times a day  oxyCODONE    IR 5 milliGRAM(s) Oral every 6 hours PRN  polyethylene glycol 3350 17 Gram(s) Oral daily  pyrazinamide 1500 milliGRAM(s) Oral <User Schedule>  pyridoxine 50 milliGRAM(s) Oral daily  rifAMPin 600 milliGRAM(s) Oral daily  senna 2 Tablet(s) Oral at bedtime      Physical Exam :    General exam :  awake, alert , on 100 % NRM  palpable sub-cutaneous emphysema on the right chest wall, to back and neck.  RRR  right subcutaneous emphysema, reduced air entry on right, moving good air on left  no edema noted on bilateral lower ext  neuro - non focal.       CBC Full  -  ( 11 Jan 2024 05:30 )  WBC Count : 15.01 K/uL  RBC Count : 3.18 M/uL  Hemoglobin : 9.5 g/dL  Hematocrit : 28.6 %  Platelet Count - Automated : 405 K/uL  Mean Cell Volume : 89.9 fl  Mean Cell Hemoglobin : 29.9 pg  Mean Cell Hemoglobin Concentration : 33.2 gm/dL  Auto Neutrophil # : 11.06 K/uL  Auto Lymphocyte # : 2.38 K/uL  Auto Monocyte # : 0.87 K/uL  Auto Eosinophil # : 0.52 K/uL  Auto Basophil # : 0.05 K/uL  Auto Neutrophil % : 73.6 %  Auto Lymphocyte % : 15.9 %  Auto Monocyte % : 5.8 %  Auto Eosinophil % : 3.5 %  Auto Basophil % : 0.3 %        01-11    141  |  111<H>  |  42<H>  ----------------------------<  118<H>  4.4   |  15<L>  |  2.43<H>    Ca    11.2<H>      11 Jan 2024 05:30  Phos  3.5     01-11  Mg     2.0     01-11    TPro  7.2  /  Alb  3.4  /  TBili  0.5  /  DBili  x   /  AST  59<H>  /  ALT  43  /  AlkPhos  87  01-11    Daily     Daily   CAPILLARY BLOOD GLUCOSE          Culture - Acid Fast - Sputum w/Smear (collected 08 Jan 2024 17:17)  Source: .Sputum Sputum  Preliminary Report (10 Edgardo 2024 15:09):    Culture is being performed.    Culture - Acid Fast - Sputum w/Smear (collected 08 Jan 2024 10:45)  Source: .Sputum Sputum  Preliminary Report (10 Edgardo 2024 15:08):    Culture is being performed.      Urinalysis Basic - ( 11 Jan 2024 05:30 )    Color: x / Appearance: x / SG: x / pH: x  Gluc: 118 mg/dL / Ketone: x  / Bili: x / Urobili: x   Blood: x / Protein: x / Nitrite: x   Leuk Esterase: x / RBC: x / WBC x   Sq Epi: x / Non Sq Epi: x / Bacteria: x

## 2024-01-11 NOTE — PROGRESS NOTE ADULT - ASSESSMENT
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 Hospital course c/b NARA nonoliguric  prerenal/ATN on CKD w/ peaked sCr at 2.71. Now w/ clinical markers suggesting AIN     1. Non oliguric NARA sec to prerenal/ATN vs possible AIN - NARA showed gradual recovery, previously peaking at 2.7  However over the last 24hrs there has been a slow uptrend up to 2.43  Based on this findings plus new peripheral eosinophilia suspect AIN is contributing to changes in renal function from antituberculosis meds  Advised to empirically start prednisone 60mg PO daily   C/w strict I/O for monitoring  Adjust and dose all medications to current GFR  Work up for other potential disease mimics unremarkable (neg ANCA panel)  Trend BMP for additional monitoring     2. Hx of HTN -  BP better controlled    c/w hydralazine 25mg TID and Labetalol 100mg BID PO     3. 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 CKD Stage III - not yet at baseline  c/w previously recommended renoproctetive measures such as: Bicarb 650mg PO TID   C/w to avoid NSAIDs and other nephrotoxins     Discussed w/ primary team

## 2024-01-11 NOTE — PROGRESS NOTE ADULT - ASSESSMENT
A/P:    73 year old M, Cantonese speaking, never a smoker, with PMHx of HTN, HLD, who is referred by Dr. Edward Jensen for an initial evaluation of pleural effusion. Patient reports right lower quadrant pain since Aug 2023, along with poor appetite, unintentionally weight loss around 20lbs, fatigue, progressively worse hoarseness and dyspnea. Pt underwent R VATS, pleural biopsy decortication, on 1/5 with Dr. Kaiser. Intraop path revealed necrotizing granuloma. ID consulted for TB workup and management. He arrived to [facility] post op with 2 CTs in place, on suction, with air leaks. POD2 AFB samples sent and obtained and he was started on antibiotic regimen per ID. POD3 AFB came back negative but on POD4 OR cultures came back positive for TB. One CT was removed with stable CXR. POD5 second CT removed. POD6 new left pneumothorax and increase in subcutaneous emphysema noted, left chest blowhole and pigtail were placed. Reported vision floaters so one TB medication changed.     Neurovascular:   - No delirium. Pain well controlled with current regimen.  -Continue tylenol, oxy IR PRN    Cardiovascular:   -Hemodynamically stable. HR controlled ()  - Hx of HTN, BP controlled (121//65), continue labetalol 100 mg q8 hours, hydralazine 25 mg q8 hours  - HLD: continue atorvastatin 10 mg daily     Respiratory:   - POD6 s/p R VATS, RA pleural biopsy and decortication  - New left PTX and SQ air today, pigtail placed to suction  - 02 Sat = 98% on RA.  -Encourage C+DB and Use of IS 10x / hr while awake.  -Continue to monitor CXR     GI:   - Stable.  -Continue GI PPX with protonix  - Bowel reg: continue dulcolax, senna, miralax   -PO Diet.    Renal / :  - BUN/Cr uptrending to 42/2.43 today, known hx CKD, nephrology following  - New urine studies pending   - Continue prednisone 60 mg daily per recs   -Continue to monitor renal function.  -Monitor I/O's.  - Replete electrolytes PRN    Endocrine:    -A1c 5.2, no known hx DM   -TSH 0.928, no known hx thyroid disease    Hematologic:  -H/H stable at 9.5/28.6 with no obvious signs of bleeding  -Coagulation Panel.    ID:  -OR culture positive for TB but sputum negative, per ID unlikely to be contagious  - Continue TB treatment with pyridozine, isoniazid, pyraxinamide, rifampin, and levoquin (ethambutol discontinued today due to patient complaint of floaters)   -Continue to monitor CBC  -Observe for SIRS/Sepsis Syndrome.    Prophylaxis:  -DVT prophylaxis with 5000 SubQ Heparin q8h.  -SCD's    Disposition:  -Home when medically appropriate.

## 2024-01-11 NOTE — PROGRESS NOTE ADULT - ASSESSMENT
IMPRESSION:  73 year old M, Cantonese speaking, never a smoker, with PMHx of HTN, HLD, who is referred by Dr. Edward Jensen for an initial evaluation of pleural effusion. Patient reports right lower quadrant pain since Aug 2023, along with poor appetite, unintentionally weight loss around 20lbs, fatigue, progressively worse hoarseness and dyspnea. He had Shingles right arm in Oct 2023, and was subsequently diagnosed with vocal cord paralysis. No personal history of TB infection. Pt underwent R VATS, pleural biopsy decortication, on 1/5 with Dr. Kiaser. Intraop path revealed necrotizing granuloma. ID was consulted for w/u of necrotizing granuloma, and concern for active TB.  Patient is having vision changes and seeing floaters now, which is known side effect of Ethambutol.    MTB PCR from pleural tissue culture is now positive, confirming the diagnosis     Recommend:  1.  Continue Rifampin 600 mg PO daily  2.  Continue  mg PO daily  3.  Stop Ethambutol 1200 mg PO three times a week (M, W, F). Patient is having vision changes and seeing floaters now, which is known side effect of Ethambutol.  4.  Start Levaquin 750mg PO q 48 hours  5.  Continue pyrazinamide 1500 mg PO three times a week (M, W, F)  6.  Continue vitamin B6 50 mg PO daily  7.  Continue airborne precautions   8.  Follow up AFB sputum cultures   9. Reported to [facility] 1/8/24. They will make home assessment and clear patient for discharge.  10. Slight increased in AST/ ALT from TB meds. Will continue to monitor and continue medications.     ID team 1 will follow   IMPRESSION:  73 year old M, Cantonese speaking, never a smoker, with PMHx of HTN, HLD, who is referred by Dr. Edward Jensen for an initial evaluation of pleural effusion. Patient reports right lower quadrant pain since Aug 2023, along with poor appetite, unintentionally weight loss around 20lbs, fatigue, progressively worse hoarseness and dyspnea. He had Shingles right arm in Oct 2023, and was subsequently diagnosed with vocal cord paralysis. No personal history of TB infection. Pt underwent R VATS, pleural biopsy decortication, on 1/5 with Dr. Kaiser. Intraop path revealed necrotizing granuloma. ID was consulted for w/u of necrotizing granuloma, and concern for active TB.  Patient is having vision changes and seeing floaters now, which is known side effect of Ethambutol.    MTB PCR from pleural tissue culture is now positive, confirming the diagnosis     Recommend:  1.  Continue Rifampin 600 mg PO daily  2.  Continue  mg PO daily  3.  Stop Ethambutol 1200 mg PO three times a week (M, W, F). Patient is having vision changes and seeing floaters now, which is known side effect of Ethambutol.  4.  Start Levaquin 750mg PO q 48 hours  5.  Continue pyrazinamide 1500 mg PO three times a week (M, W, F)  6.  Continue vitamin B6 50 mg PO daily  7.  Continue airborne precautions   8.  Follow up AFB sputum cultures   9. Reported to [facility] 1/8/24. They will make home assessment and clear patient for discharge.  10. Slight increased in AST/ ALT from TB meds. Will continue to monitor and continue medications.     ID team 1 will follow

## 2024-01-12 LAB
ANION GAP SERPL CALC-SCNC: 10 MMOL/L — SIGNIFICANT CHANGE UP (ref 5–17)
ANION GAP SERPL CALC-SCNC: 10 MMOL/L — SIGNIFICANT CHANGE UP (ref 5–17)
APTT BLD: 22.6 SEC — LOW (ref 24.5–35.6)
APTT BLD: 22.6 SEC — LOW (ref 24.5–35.6)
BUN SERPL-MCNC: 50 MG/DL — HIGH (ref 7–23)
BUN SERPL-MCNC: 50 MG/DL — HIGH (ref 7–23)
CALCIUM SERPL-MCNC: 11 MG/DL — HIGH (ref 8.4–10.5)
CALCIUM SERPL-MCNC: 11 MG/DL — HIGH (ref 8.4–10.5)
CHLORIDE SERPL-SCNC: 109 MMOL/L — HIGH (ref 96–108)
CHLORIDE SERPL-SCNC: 109 MMOL/L — HIGH (ref 96–108)
CO2 SERPL-SCNC: 17 MMOL/L — LOW (ref 22–31)
CO2 SERPL-SCNC: 17 MMOL/L — LOW (ref 22–31)
CREAT SERPL-MCNC: 2.19 MG/DL — HIGH (ref 0.5–1.3)
CREAT SERPL-MCNC: 2.19 MG/DL — HIGH (ref 0.5–1.3)
EGFR: 31 ML/MIN/1.73M2 — LOW
EGFR: 31 ML/MIN/1.73M2 — LOW
GLUCOSE SERPL-MCNC: 132 MG/DL — HIGH (ref 70–99)
GLUCOSE SERPL-MCNC: 132 MG/DL — HIGH (ref 70–99)
HCT VFR BLD CALC: 26.5 % — LOW (ref 39–50)
HCT VFR BLD CALC: 26.5 % — LOW (ref 39–50)
HGB BLD-MCNC: 8.4 G/DL — LOW (ref 13–17)
HGB BLD-MCNC: 8.4 G/DL — LOW (ref 13–17)
INR BLD: 1.1 — SIGNIFICANT CHANGE UP (ref 0.85–1.18)
INR BLD: 1.1 — SIGNIFICANT CHANGE UP (ref 0.85–1.18)
MAGNESIUM SERPL-MCNC: 2.1 MG/DL — SIGNIFICANT CHANGE UP (ref 1.6–2.6)
MAGNESIUM SERPL-MCNC: 2.1 MG/DL — SIGNIFICANT CHANGE UP (ref 1.6–2.6)
MCHC RBC-ENTMCNC: 29.8 PG — SIGNIFICANT CHANGE UP (ref 27–34)
MCHC RBC-ENTMCNC: 29.8 PG — SIGNIFICANT CHANGE UP (ref 27–34)
MCHC RBC-ENTMCNC: 31.7 GM/DL — LOW (ref 32–36)
MCHC RBC-ENTMCNC: 31.7 GM/DL — LOW (ref 32–36)
MCV RBC AUTO: 94 FL — SIGNIFICANT CHANGE UP (ref 80–100)
MCV RBC AUTO: 94 FL — SIGNIFICANT CHANGE UP (ref 80–100)
NRBC # BLD: 0 /100 WBCS — SIGNIFICANT CHANGE UP (ref 0–0)
NRBC # BLD: 0 /100 WBCS — SIGNIFICANT CHANGE UP (ref 0–0)
PHOSPHATE SERPL-MCNC: 4.1 MG/DL — SIGNIFICANT CHANGE UP (ref 2.5–4.5)
PHOSPHATE SERPL-MCNC: 4.1 MG/DL — SIGNIFICANT CHANGE UP (ref 2.5–4.5)
PLATELET # BLD AUTO: 339 K/UL — SIGNIFICANT CHANGE UP (ref 150–400)
PLATELET # BLD AUTO: 339 K/UL — SIGNIFICANT CHANGE UP (ref 150–400)
POTASSIUM SERPL-MCNC: 4.5 MMOL/L — SIGNIFICANT CHANGE UP (ref 3.5–5.3)
POTASSIUM SERPL-MCNC: 4.5 MMOL/L — SIGNIFICANT CHANGE UP (ref 3.5–5.3)
POTASSIUM SERPL-SCNC: 4.5 MMOL/L — SIGNIFICANT CHANGE UP (ref 3.5–5.3)
POTASSIUM SERPL-SCNC: 4.5 MMOL/L — SIGNIFICANT CHANGE UP (ref 3.5–5.3)
PROTHROM AB SERPL-ACNC: 12.5 SEC — SIGNIFICANT CHANGE UP (ref 9.5–13)
PROTHROM AB SERPL-ACNC: 12.5 SEC — SIGNIFICANT CHANGE UP (ref 9.5–13)
RBC # BLD: 2.82 M/UL — LOW (ref 4.2–5.8)
RBC # BLD: 2.82 M/UL — LOW (ref 4.2–5.8)
RBC # FLD: 18.1 % — HIGH (ref 10.3–14.5)
RBC # FLD: 18.1 % — HIGH (ref 10.3–14.5)
SODIUM SERPL-SCNC: 136 MMOL/L — SIGNIFICANT CHANGE UP (ref 135–145)
SODIUM SERPL-SCNC: 136 MMOL/L — SIGNIFICANT CHANGE UP (ref 135–145)
WBC # BLD: 17.05 K/UL — HIGH (ref 3.8–10.5)
WBC # BLD: 17.05 K/UL — HIGH (ref 3.8–10.5)
WBC # FLD AUTO: 17.05 K/UL — HIGH (ref 3.8–10.5)
WBC # FLD AUTO: 17.05 K/UL — HIGH (ref 3.8–10.5)

## 2024-01-12 PROCEDURE — 99232 SBSQ HOSP IP/OBS MODERATE 35: CPT

## 2024-01-12 PROCEDURE — 71045 X-RAY EXAM CHEST 1 VIEW: CPT | Mod: 26,77

## 2024-01-12 PROCEDURE — 71045 X-RAY EXAM CHEST 1 VIEW: CPT | Mod: 26

## 2024-01-12 RX ORDER — LIDOCAINE HCL 20 MG/ML
20 VIAL (ML) INJECTION ONCE
Refills: 0 | Status: COMPLETED | OUTPATIENT
Start: 2024-01-12 | End: 2024-01-12

## 2024-01-12 RX ORDER — SODIUM BICARBONATE 1 MEQ/ML
325 SYRINGE (ML) INTRAVENOUS
Refills: 0 | Status: DISCONTINUED | OUTPATIENT
Start: 2024-01-12 | End: 2024-01-17

## 2024-01-12 RX ADMIN — HEPARIN SODIUM 5000 UNIT(S): 5000 INJECTION INTRAVENOUS; SUBCUTANEOUS at 21:16

## 2024-01-12 RX ADMIN — OXYCODONE HYDROCHLORIDE 5 MILLIGRAM(S): 5 TABLET ORAL at 10:11

## 2024-01-12 RX ADMIN — Medication 975 MILLIGRAM(S): at 06:38

## 2024-01-12 RX ADMIN — Medication 20 MILLILITER(S): at 17:15

## 2024-01-12 RX ADMIN — Medication 300 MILLIGRAM(S): at 11:51

## 2024-01-12 RX ADMIN — PYRAZINAMIDE 1500 MILLIGRAM(S): 500 TABLET ORAL at 05:06

## 2024-01-12 RX ADMIN — Medication 25 MILLIGRAM(S): at 00:46

## 2024-01-12 RX ADMIN — Medication 975 MILLIGRAM(S): at 05:05

## 2024-01-12 RX ADMIN — Medication 50 MILLIGRAM(S): at 11:52

## 2024-01-12 RX ADMIN — Medication 25 MILLIGRAM(S): at 08:53

## 2024-01-12 RX ADMIN — OXYCODONE HYDROCHLORIDE 5 MILLIGRAM(S): 5 TABLET ORAL at 11:43

## 2024-01-12 RX ADMIN — Medication 100 MILLIGRAM(S): at 05:05

## 2024-01-12 RX ADMIN — Medication 100 MILLIGRAM(S): at 17:36

## 2024-01-12 RX ADMIN — Medication 100 MILLIGRAM(S): at 10:11

## 2024-01-12 RX ADMIN — Medication 60 MILLIGRAM(S): at 05:05

## 2024-01-12 RX ADMIN — HEPARIN SODIUM 5000 UNIT(S): 5000 INJECTION INTRAVENOUS; SUBCUTANEOUS at 13:38

## 2024-01-12 RX ADMIN — FAMOTIDINE 10 MILLIGRAM(S): 10 INJECTION INTRAVENOUS at 11:52

## 2024-01-12 RX ADMIN — ATORVASTATIN CALCIUM 10 MILLIGRAM(S): 80 TABLET, FILM COATED ORAL at 21:16

## 2024-01-12 RX ADMIN — Medication 25 MILLIGRAM(S): at 16:27

## 2024-01-12 RX ADMIN — HEPARIN SODIUM 5000 UNIT(S): 5000 INJECTION INTRAVENOUS; SUBCUTANEOUS at 05:06

## 2024-01-12 NOTE — PROGRESS NOTE ADULT - ASSESSMENT
73 year old M, Cantonese speaking, never a smoker, with PMHx of HTN, HLD, who is referred by Dr. Edward Jensen for an initial evaluation of pleural effusion. Patient reports right lower quadrant pain since Aug 2023, along with poor appetite, unintentionally weight loss around 20lbs, fatigue, progressively worse hoarseness and dyspnea. Pt underwent R VATS, pleural biopsy decortication, on 1/5 with Dr. Kaiser. Intraop path revealed necrotizing granuloma. ID consulted for TB workup and management. He arrived to [facility] post op with 2 CTs in place, on suction, with air leaks. POD2 AFB samples sent and obtained and he was started on antibiotic regimen per ID. POD3 AFB came back negative but on POD4 OR cultures came back positive for TB. One CT was removed with stable CXR. POD5 second CT removed. POD6 new left pneumothorax and increase in subcutaneous emphysema noted, left chest blowhole and pigtail were placed. Reported vision floaters so one TB medication changed.  73 year old M, Cantonese speaking, never a smoker, with PMHx of HTN, HLD, who is referred by Dr. Edward Jensen for an initial evaluation of pleural effusion. Patient reports right lower quadrant pain since Aug 2023, along with poor appetite, unintentionally weight loss around 20lbs, fatigue, progressively worse hoarseness and dyspnea. Pt underwent R VATS, pleural biopsy decortication, on 1/5 with Dr. Kaiser. Intraop path revealed necrotizing granuloma. ID consulted for TB workup and management. He arrived to [facility] post op with 2 CTs in place, on suction, with air leaks. POD2 AFB samples sent and obtained and he was started on antibiotic regimen per ID. POD3 AFB came back negative but on POD4 OR cultures came back positive for TB. One CT was removed with stable CXR. POD5 second CT removed. POD6 new left pneumothorax and increase in subcutaneous emphysema noted, left chest blowhole and pigtail were placed. Reported vision floaters so ethambutol discontinued. POD 7, renal concerned that rifampin could be contributing to NARA, but per ID, rifampin is a crucial medication in the management of his TB, and give that Cr was elevated before pt was started on rifampin, suspicion is low for rifampin causing AIN from ID perspective. Will keep pt on rifampin for the time being and continue to monitor. Pt pending CT chest today. This AM, pt experienced acute accumulation of subQ air in scrotum after ambulating to bathroom. Will continue to monitor.     Plan:    Neurovascular:   -Pain well controlled with current regimen. PRN's: oxy, tylenol     Cardiovascular:   -Hemodynamically stable.   -Monitor: BP, HR, tele  -HTN    -c/w lebetolol,100mg TID    -c/w hydralazine 25mg Q8h  -HLD    -c/w lipitor      Respiratory:   -Oxygenating well on room air  -Encourage continued use of IS 10x/hr and frequent ambulation  -CXR: extensive subq emphysema, now worsening ptx  -CT managment-CT to stay to suction  -Subcutaneous emphysema    -extensive subQ emphysema extending from face to scrotum    -continue to monitor  -TB    -c/w rifampin, isoniazad,pyrazanimide      GI:  -GI PPX: pepcid  -PO Diet  -Bowel Regimen: dulcolax, miralax, senna    Renal / :  -Continue to monitor renal function: BUN/Cr 50/2.19  -continue to monitor Cr, renal following  -Monitor I/O's daily     Endocrine:    -No hx of DM or thyroid dx  -A1c: 5.2  -TSH: .928    Hematologic:  -CBC: H/H- 8.4/26  -Coagulation Panel.    ID:  -Temperature: 36.7  -CBC: WBC- 17  -TB    - -c/w rifampin, isoniazad,pyrazanimide    -[facility] being contacted re home assessment prior to DC  -Continue to observe for SIRS/Sepsis Syndrome.    Prophylaxis:  -DVT prophylaxis with 5000 SubQ Heparin q8h.  -Continue with SCD's b/l while patient is at rest     Disposition:  -Discharge home once patient is medically ready

## 2024-01-12 NOTE — PROGRESS NOTE ADULT - SUBJECTIVE AND OBJECTIVE BOX
Seen and evaluated at bedside, no overnight events reported by primary team. No new complaints reported by the pt.  Family member acted as      UOP: 550ml    REVIEW OF SYSTEMS  --------------------------------------------------------------------------------  Gen: No fevers/chills, weakness  Skin: No rashes  Head/Eyes/Ears/Mouth: No headache; No hearing changes, mucous discharge, vision changes  Respiratory: No dyspnea, cough, wheezing  CV: No chest pain, palpitations  GI: No abdominal pain, diarrhea, constipation, nausea, vomiting, melena, hematochezia  : No increased frequency, dysuria, hematuria  MSK: No joint pain/swelling; no back pain; no edema  Neuro: No dizziness/lightheadedness, weakness, seizures, numbness  Heme: No easy bruising or bleeding    Standing Inpatient Medications  atorvastatin 10 milliGRAM(s) Oral at bedtime  bisacodyl 5 milliGRAM(s) Oral at bedtime  famotidine    Tablet 10 milliGRAM(s) Oral daily  heparin   Injectable 5000 Unit(s) SubCutaneous every 8 hours  hydrALAZINE 25 milliGRAM(s) Oral every 8 hours  isoniazid 300 milliGRAM(s) Oral daily  labetalol 100 milliGRAM(s) Oral three times a day  levoFLOXacin  Tablet 750 milliGRAM(s) Oral every 48 hours  polyethylene glycol 3350 17 Gram(s) Oral daily  predniSONE   Tablet 60 milliGRAM(s) Oral daily  pyrazinamide 1500 milliGRAM(s) Oral <User Schedule>  pyridoxine 50 milliGRAM(s) Oral daily  rifAMPin 600 milliGRAM(s) Oral daily  senna 2 Tablet(s) Oral at bedtime    PRN Inpatient Medications  acetaminophen     Tablet .. 975 milliGRAM(s) Oral every 6 hours PRN  oxyCODONE    IR 5 milliGRAM(s) Oral every 6 hours PRN        VITALS/PHYSICAL EXAM  --------------------------------------------------------------------------------  T(C): 36.3 (01-12-24 @ 13:39), Max: 36.7 (01-11-24 @ 17:12)  HR: 100 (01-12-24 @ 12:56) (100 - 116)  BP: 118/56 (01-12-24 @ 12:56) (115/57 - 156/75)  RR: 17 (01-12-24 @ 12:56) (16 - 17)  SpO2: 97% (01-12-24 @ 12:56) (94% - 99%)  Wt(kg): --        01-11-24 @ 07:01  -  01-12-24 @ 07:00  --------------------------------------------------------  IN: 0 mL / OUT: 537 mL / NET: -537 mL    01-12-24 @ 07:01  -  01-12-24 @ 15:24  --------------------------------------------------------  IN: 300 mL / OUT: 550 mL / NET: -250 mL      Physical Exam:    GENERAL: NAD, lying in bed   NECK: supple, No JVD  CHEST/LUNG: Clear to auscultation bilaterally  HEART: normal S1S2, RRR  ABDOMEN: Soft, Nontender   EXTREMITIES: No clubbing, cyanosis, or edema   NEUROLOGY: AAO x3, no focal neurological deficit      LABS/STUDIES  --------------------------------------------------------------------------------              8.4    17.05 >-----------<  339      [01-12-24 @ 07:29]              26.5     136  |  109  |  50  ----------------------------<  132      [01-12-24 @ 07:29]  4.5   |  17  |  2.19        Ca     11.0     [01-12-24 @ 07:29]      Mg     2.1     [01-12-24 @ 07:29]      Phos  4.1     [01-12-24 @ 07:29]    TPro  7.2  /  Alb  3.4  /  TBili  0.5  /  DBili  x   /  AST  59  /  ALT  43  /  AlkPhos  87  [01-11-24 @ 05:30]    PT/INR: PT 12.5 , INR 1.10       [01-12-24 @ 07:29]  PTT: 22.6       [01-12-24 @ 07:29]      Creatinine Trend:  SCr 2.19 [01-12 @ 07:29]  SCr 2.43 [01-11 @ 05:30]  SCr 2.16 [01-10 @ 05:30]  SCr 2.05 [01-09 @ 05:30]  SCr 2.23 [01-08 @ 05:30]    Urinalysis - [01-12-24 @ 07:29]      Color  / Appearance  / SG  / pH       Gluc 132 / Ketone   / Bili  / Urobili        Blood  / Protein  / Leuk Est  / Nitrite       RBC  / WBC  / Hyaline  / Gran  / Sq Epi  / Non Sq Epi  / Bacteria     Urine Creatinine 190      [01-11-24 @ 17:19]  Urine Protein 32      [01-11-24 @ 17:19]  Urine Sodium 30      [01-11-24 @ 17:19]  Urine Urea Nitrogen 822      [01-11-24 @ 17:19]  Urine Potassium 70      [01-11-24 @ 17:19]  Urine Osmolality 535      [01-11-24 @ 17:19]    PTH -- (Ca 10.8)      [01-09-24 @ 05:30]   6  Vitamin D (25OH) 33.2      [01-06-24 @ 12:00]  TSH 0.928      [01-08-24 @ 05:30]    HCV 0.08, Nonreact      [01-06-24 @ 06:08]    ANCA: cANCA Negative, pANCA Negative, atypical ANCA Negative      [01-07-24 @ 15:05]  Free Light Chains: kappa 5.78, lambda 6.32, ratio = 0.91      [01-09 @ 05:30]  Immunofixation Serum:   No Monoclonal Band Identified      Reference Range: None Detected      [01-09-24 @ 05:30]  SPEP Interpretation: Mild Polyclonal Gammopathy      [01-09-24 @ 05:30]  Immunofixation Urine: No Monoclonal Band Identified      Reference Range: None Detected      [01-08-24 @ 17:39]  UPEP Interpretation: Mild Selective (Glomerular) Proteinuria      [01-08-24 @ 17:39]   Seen and evaluated at bedside, no overnight events reported by primary team. No new complaints reported by the pt.  Family member acted as      UOP: 550ml    REVIEW OF SYSTEMS  --------------------------------------------------------------------------------  Gen: No fevers/chills, weakness  Skin: No rashes  Head/Eyes/Ears/Mouth: No headache; No hearing changes, mucous discharge, vision changes  Respiratory: No dyspnea, cough, wheezing  CV: No chest pain, palpitations  GI: No abdominal pain, diarrhea, constipation, nausea, vomiting, melena, hematochezia  : No increased frequency, dysuria, hematuria  MSK: No joint pain/swelling; no back pain; no edema  Neuro: No dizziness/lightheadedness, weakness, seizures, numbness  Heme: No easy bruising or bleeding    Standing Inpatient Medications  atorvastatin 10 milliGRAM(s) Oral at bedtime  bisacodyl 5 milliGRAM(s) Oral at bedtime  famotidine    Tablet 10 milliGRAM(s) Oral daily  heparin   Injectable 5000 Unit(s) SubCutaneous every 8 hours  hydrALAZINE 25 milliGRAM(s) Oral every 8 hours  isoniazid 300 milliGRAM(s) Oral daily  labetalol 100 milliGRAM(s) Oral three times a day  levoFLOXacin  Tablet 750 milliGRAM(s) Oral every 48 hours  polyethylene glycol 3350 17 Gram(s) Oral daily  predniSONE   Tablet 60 milliGRAM(s) Oral daily  pyrazinamide 1500 milliGRAM(s) Oral <User Schedule>  pyridoxine 50 milliGRAM(s) Oral daily  rifAMPin 600 milliGRAM(s) Oral daily  senna 2 Tablet(s) Oral at bedtime    PRN Inpatient Medications  acetaminophen     Tablet .. 975 milliGRAM(s) Oral every 6 hours PRN  oxyCODONE    IR 5 milliGRAM(s) Oral every 6 hours PRN        VITALS/PHYSICAL EXAM  --------------------------------------------------------------------------------  T(C): 36.3 (01-12-24 @ 13:39), Max: 36.7 (01-11-24 @ 17:12)  HR: 100 (01-12-24 @ 12:56) (100 - 116)  BP: 118/56 (01-12-24 @ 12:56) (115/57 - 156/75)  RR: 17 (01-12-24 @ 12:56) (16 - 17)  SpO2: 97% (01-12-24 @ 12:56) (94% - 99%)  Wt(kg): --        01-11-24 @ 07:01  -  01-12-24 @ 07:00  --------------------------------------------------------  IN: 0 mL / OUT: 537 mL / NET: -537 mL    01-12-24 @ 07:01  -  01-12-24 @ 15:24  --------------------------------------------------------  IN: 300 mL / OUT: 550 mL / NET: -250 mL      Physical Exam:    GENERAL: NAD, lying in bed   NECK: supple, No JVD  CHEST/LUNG: Clear to auscultation bilaterally  HEART: normal S1S2, RRR  ABDOMEN: Soft, Nontender   EXTREMITIES: No clubbing, cyanosis, or edema   NEUROLOGY: AAO x3, no focal neurological deficit      LABS/STUDIES  --------------------------------------------------------------------------------              8.4    17.05 >-----------<  339      [01-12-24 @ 07:29]              26.5     136  |  109  |  50  ----------------------------<  132      [01-12-24 @ 07:29]  4.5   |  17  |  2.19        Ca     11.0     [01-12-24 @ 07:29]      Mg     2.1     [01-12-24 @ 07:29]      Phos  4.1     [01-12-24 @ 07:29]    TPro  7.2  /  Alb  3.4  /  TBili  0.5  /  DBili  x   /  AST  59  /  ALT  43  /  AlkPhos  87  [01-11-24 @ 05:30]    PT/INR: PT 12.5 , INR 1.10       [01-12-24 @ 07:29]  PTT: 22.6       [01-12-24 @ 07:29]      Creatinine Trend:  SCr 2.19 [01-12 @ 07:29]  SCr 2.43 [01-11 @ 05:30]  SCr 2.16 [01-10 @ 05:30]  SCr 2.05 [01-09 @ 05:30]  SCr 2.23 [01-08 @ 05:30]    Urinalysis - [01-12-24 @ 07:29]      Color  / Appearance  / SG  / pH       Gluc 132 / Ketone   / Bili  / Urobili        Blood  / Protein  / Leuk Est  / Nitrite       RBC  / WBC  / Hyaline  / Gran  / Sq Epi  / Non Sq Epi  / Bacteria     Urine Creatinine 190      [01-11-24 @ 17:19]  Urine Protein 32      [01-11-24 @ 17:19]  Urine Sodium 30      [01-11-24 @ 17:19]  Urine Urea Nitrogen 822      [01-11-24 @ 17:19]  Urine Potassium 70      [01-11-24 @ 17:19]  Urine Osmolality 535      [01-11-24 @ 17:19]    PTH -- (Ca 10.8)      [01-09-24 @ 05:30]   6  Vitamin D (25OH) 33.2      [01-06-24 @ 12:00]  TSH 0.928      [01-08-24 @ 05:30]    HCV 0.08, Nonreact      [01-06-24 @ 06:08]    ANCA: cANCA Negative, pANCA Negative, atypical ANCA Negative      [01-07-24 @ 15:05]  Free Light Chains: kappa 5.78, lambda 6.32, ratio = 0.91      [01-09 @ 05:30]  Immunofixation Serum:   No Monoclonal Band Identified      Reference Range: None Detected      [01-09-24 @ 05:30]  SPEP Interpretation: Mild Polyclonal Gammopathy      [01-09-24 @ 05:30]  Immunofixation Urine: No Monoclonal Band Identified      Reference Range: None Detected      [01-08-24 @ 17:39]  UPEP Interpretation: Mild Selective (Glomerular) Proteinuria      [01-08-24 @ 17:39]    Renal US 1/7 --> Echogenic renal parenchyma bilaterally, consistent with medical renal disease. No evidence of hydronephrosis b/l

## 2024-01-12 NOTE — PROGRESS NOTE ADULT - ASSESSMENT
73 year old M, Cantonese speaking, never a smoker, with PMHx of HTN, HLD, CKD3,BPH,  Gout, hx Shingles right arm ( 10/2023), hx RLQ pain since ( 8/2023) along with poor appetite, unintentional weight loss around 20 lbs, and progressively worse hoarseness and dyspnea. recent EGD and colonoscopy unremarkable. Pt was referred by Heme Onc Dr. Edward Jensen for an initial evaluation of right pleural effusion. Pt underwent 	R VATS RA Pleural biopsy and decortication (Frozen + Necrotizing granuloma) 1/5/24- pleural AFB positive, sputum AFB smear negative x 3 ( culture pending ) on RIBE + B 6   pod # 7    - 9LA for HD monitoring   - airborne isolation to rule out TB  - right pneumothorax/sub-cutaneous emphysema- CT placed 1/11- repeat x ray, reviewed, reduced/resolved pneumo, increase sc emphysema.  saturating well on room air.  CT care by CTS.     - ID f/u appreciated, Team 1 Dr. Alcala discussed.  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  "1.  Continue Rifampin 600 mg PO daily  2.  Continue  mg PO daily  3.  Stop Ethambutol 1200 mg PO three times a week (M, W, F). Patient is having vision changes and seeing floaters now, which is known side effect of Ethambutol.  4.  Start Levaquin 750mg PO q 48 hours  5.  Continue pyrazinamide 1500 mg PO three times a week (M, W, F)  6.  Continue vitamin B6 50 mg PO daily"    - ID contacted  [facility]-  AARON called and discussed with wife 1/10/24- ( not contagious to others) to follow up with AARON    - Renal f/u appreciated, for NARA on CKD3 (baseline Cr 1.8) : S Cr baseline 1.91( 12/15/23)-> 2.71 ( 1/5)-> 2.52( 1/6)-> 2.13( 1/7)-> 2.23( 1/8)-> 2.05(1/9) , UA noted w. mild proteinuria  - ANCA negative    - Hypercalcemia: could be related to MTB. f/u Hypercalcemia workupas delineated per Renal - avoid calcium supplement and vitamin D , adequate hydration.   -  trend BMP for S Cr and avoid nephrotoxic meds/NSAIDS/iv contrast dye     - pain control   - acetaminophen     Tablet .. 975 milliGRAM(s) Oral every 6 hours PRN  - oxyCODONE    IR 5 milliGRAM(s) Oral every 6 hours PRN  - bowel regimen:   - bisacodyl 5 milliGRAM(s) Oral at bedtime  - polyethylene glycol 3350 17 Gram(s) Oral daily- held for diarrhoea.  - senna 2 Tablet(s) Oral at bedtime-held for diarrhoea.    - HTN: and tachycardia.   - could be contributing from pain, pt and family educated, to ask for pain meds.   - currently on Hydralazine 25 mg q 8 hourly  - labetalol 100 tid     - HLD: atorvastatin 10 milliGRAM(s) Oral at bedtime  - GI ppx: pantoprazole    Tablet 40 milliGRAM(s) Oral before breakfast  - DVT ppx: heparin   Injectable 5000 Unit(s) SubCutaneous every 8 hours    Contacts:   Referring Heme Onc Dr Joanne Jensen     POC as d/w family and CTS PA, wife             73 year old M, Cantonese speaking, never a smoker, with PMHx of HTN, HLD, CKD3,BPH,  Gout, hx Shingles right arm ( 10/2023), hx RLQ pain since ( 8/2023) along with poor appetite, unintentional weight loss around 20 lbs, and progressively worse hoarseness and dyspnea. recent EGD and colonoscopy unremarkable. Pt was referred by Heme Onc Dr. Edward Jensen for an initial evaluation of right pleural effusion. Pt underwent 	R VATS RA Pleural biopsy and decortication (Frozen + Necrotizing granuloma) 1/5/24- pleural AFB positive, sputum AFB smear negative x 3 ( culture pending ) on RIBE + B 6   pod # 7    - 9LA for HD monitoring   - airborne isolation to rule out TB  - right pneumothorax/sub-cutaneous emphysema- CT placed 1/11- repeat x ray, reviewed, reduced/resolved pneumo, increase sc emphysema.  saturating well on room air.  CT care by CTS.     - ID f/u appreciated, Team 1 Dr. Alcala discussed.  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  "1.  Continue Rifampin 600 mg PO daily  2.  Continue  mg PO daily  3.  Stop Ethambutol 1200 mg PO three times a week (M, W, F). Patient is having vision changes and seeing floaters now, which is known side effect of Ethambutol.  4.  Start Levaquin 750mg PO q 48 hours  5.  Continue pyrazinamide 1500 mg PO three times a week (M, W, F)  6.  Continue vitamin B6 50 mg PO daily"    - ID contacted  [facility]-  AARON called and discussed with wife 1/10/24- ( not contagious to others) to follow up with AARON    - Renal f/u appreciated, for NARA on CKD3 (baseline Cr 1.8) : S Cr baseline 1.91( 12/15/23)-> 2.71 ( 1/5)-> 2.52( 1/6)-> 2.13( 1/7)-> 2.23( 1/8)-> 2.05(1/9) , UA noted w. mild proteinuria  - ANCA negative    - Hypercalcemia: could be related to MTB. f/u Hypercalcemia workupas delineated per Renal - avoid calcium supplement and vitamin D , adequate hydration.   -  trend BMP for S Cr and avoid nephrotoxic meds/NSAIDS/iv contrast dye     - pain control   - acetaminophen     Tablet .. 975 milliGRAM(s) Oral every 6 hours PRN  - oxyCODONE    IR 5 milliGRAM(s) Oral every 6 hours PRN  - bowel regimen:   - bisacodyl 5 milliGRAM(s) Oral at bedtime  - polyethylene glycol 3350 17 Gram(s) Oral daily- held for diarrhoea.  - senna 2 Tablet(s) Oral at bedtime-held for diarrhoea.    - HTN: and tachycardia.   - could be contributing from pain, pt and family educated, to ask for pain meds.   - currently on Hydralazine 25 mg q 8 hourly  - labetalol 100 tid     - HLD: atorvastatin 10 milliGRAM(s) Oral at bedtime  - GI ppx: pantoprazole    Tablet 40 milliGRAM(s) Oral before breakfast  - DVT ppx: heparin   Injectable 5000 Unit(s) SubCutaneous every 8 hours    Contacts:   Referring Heme Onc Dr Joanne Jensen     POC as d/w family and CTS PA, wife

## 2024-01-12 NOTE — PROGRESS NOTE ADULT - SUBJECTIVE AND OBJECTIVE BOX
Patient discussed on morning rounds with Dr. Kaiser    OPERATION & DATE: 1/5 R VATS, RA, pleural biopsy and decortication    SUBJECTIVE ASSESSMENT: resting comfortably in bed in NAD, reports new swelling of scrotum which happened after he went to the bathroom this morning. Facial swelling still present and largely unchanged since yesterday.     VITAL SIGNS:  Vital Signs Last 24 Hrs  T(C): 36.3 (12 Jan 2024 09:00), Max: 36.7 (11 Jan 2024 17:12)  T(F): 97.4 (12 Jan 2024 09:00), Max: 98 (11 Jan 2024 17:12)  HR: 100 (12 Jan 2024 12:56) (100 - 116)  BP: 118/56 (12 Jan 2024 12:56) (115/57 - 156/75)  BP(mean): 78 (12 Jan 2024 12:56) (78 - 108)  RR: 17 (12 Jan 2024 12:56) (16 - 17)  SpO2: 97% (12 Jan 2024 12:56) (94% - 99%)    Parameters below as of 12 Jan 2024 12:56  Patient On (Oxygen Delivery Method): room air      I&O's Detail    11 Jan 2024 07:01  -  12 Jan 2024 07:00  --------------------------------------------------------  IN:  Total IN: 0 mL    OUT:    Chest Tube (mL): 10 mL    Voided (mL): 527 mL  Total OUT: 537 mL    Total NET: -537 mL      12 Jan 2024 07:01  -  12 Jan 2024 13:27  --------------------------------------------------------  IN:    Sodium Chloride 0.9% Bolus: 300 mL  Total IN: 300 mL    OUT:    Chest Tube (mL): 0 mL    VAC (Vacuum Assisted Closure) System (mL): 0 mL    Voided (mL): 550 mL  Total OUT: 550 mL    Total NET: -250 mL        CHEST TUBE: R side CT in place to suction    DEON DRAIN:  none  EPICARDIAL WIRES: none  STITCHES: none  STAPLES: none  TRUJILLO: none  CENTRAL LINE: none  MIDLINE/PICC: none  WOUND VAC: R chest over blowhole    PHYSICAL EXAM:  General: resting comfortably in bed in NAD  Neurological: AOx3. Motor skills grossly intact  Cardiovascular: Normal S1/S2. Regular rate/rhythm. No murmurs  Respiratory: Lungs CTA bilaterally. No wheezing or rales, significant sub q emphysema overlying chest, bilateral arms, and shoulders which extends up to face. Significant scrotal subQ emphysema.   Gastrointestinal: +BS in all 4 quadrants. Non-distended. Soft. Non-tender  Extremities: Strength 5/5 b/l upper/lower extremities. Sensation grossly intact upper/lower extremities. No edema. No calf tenderness.  Vascular: Radial 2+bilaterally, DP 2+ b/l  Incision Sites: VATS incisions clean, dry, intact      LABS:                        8.4    17.05 )-----------( 339      ( 12 Jan 2024 07:29 )             26.5     PT/INR - ( 12 Jan 2024 07:29 )   PT: 12.5 sec;   INR: 1.10          PTT - ( 12 Jan 2024 07:29 )  PTT:22.6 sec  01-12    136  |  109<H>  |  50<H>  ----------------------------<  132<H>  4.5   |  17<L>  |  2.19<H>    Ca    11.0<H>      12 Jan 2024 07:29  Phos  4.1     01-12  Mg     2.1     01-12    TPro  7.2  /  Alb  3.4  /  TBili  0.5  /  DBili  x   /  AST  59<H>  /  ALT  43  /  AlkPhos  87  01-11    Urinalysis Basic - ( 12 Jan 2024 07:29 )    Color: x / Appearance: x / SG: x / pH: x  Gluc: 132 mg/dL / Ketone: x  / Bili: x / Urobili: x   Blood: x / Protein: x / Nitrite: x   Leuk Esterase: x / RBC: x / WBC x   Sq Epi: x / Non Sq Epi: x / Bacteria: x      MEDICATIONS  (STANDING):  atorvastatin 10 milliGRAM(s) Oral at bedtime  bisacodyl 5 milliGRAM(s) Oral at bedtime  famotidine    Tablet 10 milliGRAM(s) Oral daily  heparin   Injectable 5000 Unit(s) SubCutaneous every 8 hours  hydrALAZINE 25 milliGRAM(s) Oral every 8 hours  isoniazid 300 milliGRAM(s) Oral daily  labetalol 100 milliGRAM(s) Oral three times a day  levoFLOXacin  Tablet 750 milliGRAM(s) Oral every 48 hours  polyethylene glycol 3350 17 Gram(s) Oral daily  predniSONE   Tablet 60 milliGRAM(s) Oral daily  pyrazinamide 1500 milliGRAM(s) Oral <User Schedule>  pyridoxine 50 milliGRAM(s) Oral daily  rifAMPin 600 milliGRAM(s) Oral daily  senna 2 Tablet(s) Oral at bedtime    MEDICATIONS  (PRN):  acetaminophen     Tablet .. 975 milliGRAM(s) Oral every 6 hours PRN Mild Pain (1 - 3)  oxyCODONE    IR 5 milliGRAM(s) Oral every 6 hours PRN Moderate Pain (4 - 6)    RADIOLOGY & ADDITIONAL TESTS:

## 2024-01-12 NOTE — PROGRESS NOTE ADULT - ASSESSMENT
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 Hospital course c/b NARA nonoliguric initially thought to be prerenal/ATN with partial response to IVF down to 2.1 ranges. However, gradual uptrend noted starting on 1/8 2.23-2.0-2.1 w/ highest SCr noted on 1/11 at 2.43 in addition to peripheral Eosinophilia 13.3% on 1/10 suggesting additional renal insult such as AIN drug induced     1. Non oliguric NARA sec to prerenal/ATN vs possible AIN - initial renal insult attributed to ATN/pre renal etiology which respond to IVF trial w/ SCr down to 2.1 ranges  However gradual uptrend noted on renal function up to 2.43 ranges in the setting of stable hemodynamics, bland UA and peripheral Eosinophilia suggest possible acute interstitial nephritis (drug induced) possibly related to rifampin use (started on 1/6)  Empirically started on prednisone 60mg because of these findings on 1/11 w/ most recent SCr at 2.1  Discussed w/ ID and since Rifampin is a crucial anti-tb medication and stopping this medication will lead to poor clinical outcomes, it was decided to c/w this agent in addition to PO steroids.   C/w prednisone 60mg PO daily (day #2)   C/w strict I/O for monitoring  c/w PUD ppx w/ H2 antagonist   Adjust and dose all medications to current GFR  Work up for other potential disease mimics unremarkable   Trend BMP for additional renal function monitoring     2. Hx of HTN -  c/w hydralazine 25mg TID and Labetalol 100mg PO BID    3. 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 Hospital course c/b NARA non oliguric initially thought to be prerenal/ATN with partial response to IVF down to 2.1 ranges. However, gradual uptrend noted starting on 1/8  (2.23-2.0-2.1) w/ highest SCr noted on 1/11 at 2.43 in addition to peripheral Eosinophilia 13.3% on 1/10 suggesting additional renal insult such as AIN drug induced     1. Non oliguric NARA sec to prerenal/ATN vs possible AIN - initial renal insult attributed to ATN/pre renal etiology which responded to IVF trial w/ SCr down to 2.1 ranges  However gradual uptrend noted on renal function up to 2.43 ranges in the setting of normal renal US, stable hemodynamics, bland UA and peripheral Eosinophilia suggesting acute interstitial nephritis (drug induced) possibly related to rifampin use (started on 1/6)  Empirically started on prednisone 60mg because of these findings on 1/11 w/ most recent SCr at 2.1  Discussed w/ ID and since Rifampin is a crucial anti-tb medication, stopping this medication will lead to poor clinical outcomes, it was decided to c/w this agent in addition to PO steroids.   C/w prednisone 60mg PO daily (day #2)   C/w strict I/O for monitoring  c/w PUD ppx w/ H2 antagonist   Adjust and dose all medications to current GFR  Work up for other potential disease mimics unremarkable   Trend BMP for additional renal function monitoring     2. Hx of HTN -  c/w hydralazine 25mg TID and Labetalol 100mg PO BID    3. Hypercalcemia w/ suppressed PTH - in the setting of on going granulomatous disease (rest of hyperCa+ work up unremarkable)  Maintain adequate volume status  Maintain low calcium intake (no more than 400 mg/day) and low oxalate diet   Avoid dietary vitamin D supplements  Expect gradual correction of Ca+ levels as treatment of the underlying disease continues    4. CKD Stage III - not yet at baseline  c/w Bicarb 130mg PO TID   C/w to avoid NSAIDs and other nephrotoxins     Discussed w/ primary team

## 2024-01-12 NOTE — PROGRESS NOTE ADULT - SUBJECTIVE AND OBJECTIVE BOX
GUILLAUME MANNING , [id number],  [facility] [id number] [id number]    Time of encounter : 10 am   wife at bedside  intervally - right chest tube placed, pain currently is 7/10 on right ( spoke with RN to give oxy )  overnight multiple bm -did not sleep well  sub-cutaneouse emphysema on neck and both cheeks- some hoarseness of voice- saturating well on room air.   tired      T(C): 36.3 (01-12-24 @ 09:00), Max: 36.7 (01-11-24 @ 17:12)  HR: 100 (01-12-24 @ 08:52) (100 - 116)  BP: 156/75 (01-12-24 @ 08:52) (115/57 - 156/75)  RR: 16 (01-12-24 @ 08:52) (16 - 17)  SpO2: 96% (01-12-24 @ 08:52) (94% - 99%)    acetaminophen     Tablet .. 975 milliGRAM(s) Oral every 6 hours PRN  atorvastatin 10 milliGRAM(s) Oral at bedtime  bisacodyl 5 milliGRAM(s) Oral at bedtime  famotidine    Tablet 10 milliGRAM(s) Oral daily  heparin   Injectable 5000 Unit(s) SubCutaneous every 8 hours  hydrALAZINE 25 milliGRAM(s) Oral every 8 hours  isoniazid 300 milliGRAM(s) Oral daily  labetalol 100 milliGRAM(s) Oral three times a day  levoFLOXacin  Tablet 750 milliGRAM(s) Oral every 48 hours  oxyCODONE    IR 5 milliGRAM(s) Oral every 6 hours PRN  polyethylene glycol 3350 17 Gram(s) Oral daily  predniSONE   Tablet 60 milliGRAM(s) Oral daily  pyrazinamide 1500 milliGRAM(s) Oral <User Schedule>  pyridoxine 50 milliGRAM(s) Oral daily  rifAMPin 600 milliGRAM(s) Oral daily  senna 2 Tablet(s) Oral at bedtime      Physical Exam :    General exam :  sitting up on the bed, awake, appear tired.  sc emphysema on both cheeks, neck , back   moving good air on left, reduced on right ( with sc emphysema )  chest tube on right   no edema noted on bilateral lower ext  neuro - non focal.     CBC Full  -  ( 12 Jan 2024 07:29 )  WBC Count : 17.05 K/uL  RBC Count : 2.82 M/uL  Hemoglobin : 8.4 g/dL  Hematocrit : 26.5 %  Platelet Count - Automated : 339 K/uL  Mean Cell Volume : 94.0 fl  Mean Cell Hemoglobin : 29.8 pg  Mean Cell Hemoglobin Concentration : 31.7 gm/dL  Auto Neutrophil # : x  Auto Lymphocyte # : x  Auto Monocyte # : x  Auto Eosinophil # : x  Auto Basophil # : x  Auto Neutrophil % : x  Auto Lymphocyte % : x  Auto Monocyte % : x  Auto Eosinophil % : x  Auto Basophil % : x        01-12    136  |  109<H>  |  50<H>  ----------------------------<  132<H>  4.5   |  17<L>  |  2.19<H>    Ca    11.0<H>      12 Jan 2024 07:29  Phos  4.1     01-12  Mg     2.1     01-12    TPro  7.2  /  Alb  3.4  /  TBili  0.5  /  DBili  x   /  AST  59<H>  /  ALT  43  /  AlkPhos  87  01-11    Daily     Daily   CAPILLARY BLOOD GLUCOSE          Urinalysis Basic - ( 12 Jan 2024 07:29 )    Color: x / Appearance: x / SG: x / pH: x  Gluc: 132 mg/dL / Ketone: x  / Bili: x / Urobili: x   Blood: x / Protein: x / Nitrite: x   Leuk Esterase: x / RBC: x / WBC x   Sq Epi: x / Non Sq Epi: x / Bacteria: x        PT/INR - ( 12 Jan 2024 07:29 )   PT: 12.5 sec;   INR: 1.10          PTT - ( 12 Jan 2024 07:29 )  PTT:22.6 sec           GUILLAUME MANNING , [id number],  [facility] 09LA 929 01    Time of encounter : 10 am   wife at bedside  intervally - right chest tube placed, pain currently is 7/10 on right ( spoke with RN to give oxy )  overnight multiple bm -did not sleep well  sub-cutaneouse emphysema on neck and both cheeks- some hoarseness of voice- saturating well on room air.   tired      T(C): 36.3 (01-12-24 @ 09:00), Max: 36.7 (01-11-24 @ 17:12)  HR: 100 (01-12-24 @ 08:52) (100 - 116)  BP: 156/75 (01-12-24 @ 08:52) (115/57 - 156/75)  RR: 16 (01-12-24 @ 08:52) (16 - 17)  SpO2: 96% (01-12-24 @ 08:52) (94% - 99%)    acetaminophen     Tablet .. 975 milliGRAM(s) Oral every 6 hours PRN  atorvastatin 10 milliGRAM(s) Oral at bedtime  bisacodyl 5 milliGRAM(s) Oral at bedtime  famotidine    Tablet 10 milliGRAM(s) Oral daily  heparin   Injectable 5000 Unit(s) SubCutaneous every 8 hours  hydrALAZINE 25 milliGRAM(s) Oral every 8 hours  isoniazid 300 milliGRAM(s) Oral daily  labetalol 100 milliGRAM(s) Oral three times a day  levoFLOXacin  Tablet 750 milliGRAM(s) Oral every 48 hours  oxyCODONE    IR 5 milliGRAM(s) Oral every 6 hours PRN  polyethylene glycol 3350 17 Gram(s) Oral daily  predniSONE   Tablet 60 milliGRAM(s) Oral daily  pyrazinamide 1500 milliGRAM(s) Oral <User Schedule>  pyridoxine 50 milliGRAM(s) Oral daily  rifAMPin 600 milliGRAM(s) Oral daily  senna 2 Tablet(s) Oral at bedtime      Physical Exam :    General exam :  sitting up on the bed, awake, appear tired.  sc emphysema on both cheeks, neck , back   moving good air on left, reduced on right ( with sc emphysema )  chest tube on right   no edema noted on bilateral lower ext  neuro - non focal.     CBC Full  -  ( 12 Jan 2024 07:29 )  WBC Count : 17.05 K/uL  RBC Count : 2.82 M/uL  Hemoglobin : 8.4 g/dL  Hematocrit : 26.5 %  Platelet Count - Automated : 339 K/uL  Mean Cell Volume : 94.0 fl  Mean Cell Hemoglobin : 29.8 pg  Mean Cell Hemoglobin Concentration : 31.7 gm/dL  Auto Neutrophil # : x  Auto Lymphocyte # : x  Auto Monocyte # : x  Auto Eosinophil # : x  Auto Basophil # : x  Auto Neutrophil % : x  Auto Lymphocyte % : x  Auto Monocyte % : x  Auto Eosinophil % : x  Auto Basophil % : x        01-12    136  |  109<H>  |  50<H>  ----------------------------<  132<H>  4.5   |  17<L>  |  2.19<H>    Ca    11.0<H>      12 Jan 2024 07:29  Phos  4.1     01-12  Mg     2.1     01-12    TPro  7.2  /  Alb  3.4  /  TBili  0.5  /  DBili  x   /  AST  59<H>  /  ALT  43  /  AlkPhos  87  01-11    Daily     Daily   CAPILLARY BLOOD GLUCOSE          Urinalysis Basic - ( 12 Jan 2024 07:29 )    Color: x / Appearance: x / SG: x / pH: x  Gluc: 132 mg/dL / Ketone: x  / Bili: x / Urobili: x   Blood: x / Protein: x / Nitrite: x   Leuk Esterase: x / RBC: x / WBC x   Sq Epi: x / Non Sq Epi: x / Bacteria: x        PT/INR - ( 12 Jan 2024 07:29 )   PT: 12.5 sec;   INR: 1.10          PTT - ( 12 Jan 2024 07:29 )  PTT:22.6 sec

## 2024-01-12 NOTE — PROGRESS NOTE ADULT - ASSESSMENT
IMPRESSION:  73 year old M, Cantonese speaking, never a smoker, with PMHx of HTN, HLD, who is referred by Dr. Edward Jensen for an initial evaluation of pleural effusion. Patient reports right lower quadrant pain since Aug 2023, along with poor appetite, unintentionally weight loss around 20lbs, fatigue, progressively worse hoarseness and dyspnea. He had Shingles right arm in Oct 2023, and was subsequently diagnosed with vocal cord paralysis. No personal history of TB infection. Pt underwent R VATS, pleural biopsy decortication, on 1/5 with Dr. Kaiser. Intraop path revealed necrotizing granuloma. ID was consulted for w/u of necrotizing granuloma, and concern for active TB.  Patient is having vision changes and seeing floaters now, which is known side effect of Ethambutol.    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  Steroids are not completely contraindicated with TB and in fact in some cases of TB (TB meningitis and TB pericarditis), steroids are indicated.  I explained that rifampin is the most important and efficacious medication for TB stopping it will lead to worse outcomes.  For now ok to continue rifampin and steroids while work up is underway    Recommend:  1.  Continue Rifampin 600 mg PO daily  2.  Continue  mg PO daily  3.  Continu Levaquin 750mg PO q 48 hours  4.  Continue pyrazinamide 1500 mg PO three times a week (M, W, F)  5.  Continue vitamin B6 50 mg PO daily  6.  Continue airborne precautions   7.  Follow up AFB sputum cultures   8. Reported to [facility] 1/8/24. They will make home assessment and clear patient for discharge.  9. I placed a call to TB experts at [facility] for consultation to see if they had any guidance on alternative regimens or insight into possible causes of elevated creatinine     ID team 1 will follow. Dr. Arora will cover this weekend. I will return on 1/15/24   IMPRESSION:  73 year old M, Cantonese speaking, never a smoker, with PMHx of HTN, HLD, who is referred by Dr. Edward Jensen for an initial evaluation of pleural effusion. Patient reports right lower quadrant pain since Aug 2023, along with poor appetite, unintentionally weight loss around 20lbs, fatigue, progressively worse hoarseness and dyspnea. He had Shingles right arm in Oct 2023, and was subsequently diagnosed with vocal cord paralysis. No personal history of TB infection. Pt underwent R VATS, pleural biopsy decortication, on 1/5 with Dr. Kaiser. Intraop path revealed necrotizing granuloma. ID was consulted for w/u of necrotizing granuloma, and concern for active TB.  Patient is having vision changes and seeing floaters now, which is known side effect of Ethambutol.    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  Steroids are not completely contraindicated with TB and in fact in some cases of TB (TB meningitis and TB pericarditis), steroids are indicated.  I explained that rifampin is the most important and efficacious medication for TB stopping it will lead to worse outcomes.  For now ok to continue rifampin and steroids while work up is underway    Recommend:  1.  Continue Rifampin 600 mg PO daily  2.  Continue  mg PO daily  3.  Continu Levaquin 750mg PO q 48 hours  4.  Continue pyrazinamide 1500 mg PO three times a week (M, W, F)  5.  Continue vitamin B6 50 mg PO daily  6.  Continue airborne precautions   7.  Follow up AFB sputum cultures   8. Reported to [facility] 1/8/24. They will make home assessment and clear patient for discharge.  9. I placed a call to TB experts at [facility] for consultation to see if they had any guidance on alternative regimens or insight into possible causes of elevated creatinine     ID team 1 will follow. Dr. Arora will cover this weekend. I will return on 1/15/24

## 2024-01-12 NOTE — PROGRESS NOTE ADULT - SUBJECTIVE AND OBJECTIVE BOX
INTERVAL HPI/OVERNIGHT EVENTS:    Patient was seen and examined at bedside.  S/P chest tube insertion for pneumothorax.  Started on Prednisone by nephrology due to concern of AIN.  Patient with one episode of diarrhea.  Reports floaters have improved     CONSTITUTIONAL:  Negative fever or chills, feels well, good appetite  EYES:  Negative  blurry vision or double vision  CARDIOVASCULAR:  Negative for chest pain or palpitations  RESPIRATORY:  Negative for cough, wheezing, or SOB   GASTROINTESTINAL:  Negative for nausea, vomiting, diarrhea, constipation, or abdominal pain  GENITOURINARY:  Negative frequency, urgency or dysuria  NEUROLOGIC:  No headache, confusion, dizziness, lightheadedness      ANTIBIOTICS/RELEVANT:    MEDICATIONS  (STANDING):  atorvastatin 10 milliGRAM(s) Oral at bedtime  bisacodyl 5 milliGRAM(s) Oral at bedtime  famotidine    Tablet 10 milliGRAM(s) Oral daily  heparin   Injectable 5000 Unit(s) SubCutaneous every 8 hours  hydrALAZINE 25 milliGRAM(s) Oral every 8 hours  isoniazid 300 milliGRAM(s) Oral daily  labetalol 100 milliGRAM(s) Oral three times a day  levoFLOXacin  Tablet 750 milliGRAM(s) Oral every 48 hours  polyethylene glycol 3350 17 Gram(s) Oral daily  predniSONE   Tablet 60 milliGRAM(s) Oral daily  pyrazinamide 1500 milliGRAM(s) Oral <User Schedule>  pyridoxine 50 milliGRAM(s) Oral daily  rifAMPin 600 milliGRAM(s) Oral daily  senna 2 Tablet(s) Oral at bedtime    MEDICATIONS  (PRN):  acetaminophen     Tablet .. 975 milliGRAM(s) Oral every 6 hours PRN Mild Pain (1 - 3)  oxyCODONE    IR 5 milliGRAM(s) Oral every 6 hours PRN Moderate Pain (4 - 6)        Vital Signs Last 24 Hrs  T(C): 36.3 (12 Jan 2024 09:00), Max: 36.7 (11 Jan 2024 17:12)  T(F): 97.4 (12 Jan 2024 09:00), Max: 98 (11 Jan 2024 17:12)  HR: 100 (12 Jan 2024 08:52) (100 - 116)  BP: 156/75 (12 Jan 2024 08:52) (115/57 - 156/75)  BP(mean): 108 (12 Jan 2024 08:52) (79 - 108)  RR: 16 (12 Jan 2024 08:52) (16 - 17)  SpO2: 96% (12 Jan 2024 08:52) (94% - 99%)    Parameters below as of 12 Jan 2024 08:52  Patient On (Oxygen Delivery Method): room air        PHYSICAL EXAM:  Constitutional: non-toxic, no distress  Eyes:  no icterus   Ear/Nose/Throat: + neck swelling	  Neck:  supple, + crepitus   Respiratory: CTA jose  Cardiovascular: S1S2 RRR, no murmurs  Gastrointestinal:soft, (+) BS, no HSM  Extremities:no e/e/c  Vascular: DP Pulse:	right normal; left normal      LABS:                        8.4    17.05 )-----------( 339      ( 12 Jan 2024 07:29 )             26.5     01-12    136  |  109<H>  |  50<H>  ----------------------------<  132<H>  4.5   |  17<L>  |  2.19<H>    Ca    11.0<H>      12 Jan 2024 07:29  Phos  4.1     01-12  Mg     2.1     01-12    TPro  7.2  /  Alb  3.4  /  TBili  0.5  /  DBili  x   /  AST  59<H>  /  ALT  43  /  AlkPhos  87  01-11    PT/INR - ( 12 Jan 2024 07:29 )   PT: 12.5 sec;   INR: 1.10          PTT - ( 12 Jan 2024 07:29 )  PTT:22.6 sec  Urinalysis Basic - ( 12 Jan 2024 07:29 )    Color: x / Appearance: x / SG: x / pH: x  Gluc: 132 mg/dL / Ketone: x  / Bili: x / Urobili: x   Blood: x / Protein: x / Nitrite: x   Leuk Esterase: x / RBC: x / WBC x   Sq Epi: x / Non Sq Epi: x / Bacteria: x        MICROBIOLOGY:    Culture - Acid Fast - Sputum w/Smear . (01.08.24 @ 17:17)    Specimen Source: .Sputum Sputum   Acid Fast Bacilli Smear:   No acid-fast bacilli seen by fluorochrome stain   Culture Results:   Culture is being performed.        RADIOLOGY & ADDITIONAL STUDIES:

## 2024-01-13 LAB
ANION GAP SERPL CALC-SCNC: 10 MMOL/L — SIGNIFICANT CHANGE UP (ref 5–17)
ANION GAP SERPL CALC-SCNC: 10 MMOL/L — SIGNIFICANT CHANGE UP (ref 5–17)
APPEARANCE UR: CLEAR — SIGNIFICANT CHANGE UP
APPEARANCE UR: CLEAR — SIGNIFICANT CHANGE UP
BILIRUB UR-MCNC: NEGATIVE — SIGNIFICANT CHANGE UP
BILIRUB UR-MCNC: NEGATIVE — SIGNIFICANT CHANGE UP
BUN SERPL-MCNC: 48 MG/DL — HIGH (ref 7–23)
BUN SERPL-MCNC: 48 MG/DL — HIGH (ref 7–23)
CALCIUM SERPL-MCNC: 10.3 MG/DL — SIGNIFICANT CHANGE UP (ref 8.4–10.5)
CALCIUM SERPL-MCNC: 10.3 MG/DL — SIGNIFICANT CHANGE UP (ref 8.4–10.5)
CHLORIDE SERPL-SCNC: 111 MMOL/L — HIGH (ref 96–108)
CHLORIDE SERPL-SCNC: 111 MMOL/L — HIGH (ref 96–108)
CO2 SERPL-SCNC: 18 MMOL/L — LOW (ref 22–31)
CO2 SERPL-SCNC: 18 MMOL/L — LOW (ref 22–31)
COLOR SPEC: YELLOW — SIGNIFICANT CHANGE UP
COLOR SPEC: YELLOW — SIGNIFICANT CHANGE UP
CREAT ?TM UR-MCNC: 100 MG/DL — SIGNIFICANT CHANGE UP
CREAT ?TM UR-MCNC: 100 MG/DL — SIGNIFICANT CHANGE UP
CREAT SERPL-MCNC: 2.23 MG/DL — HIGH (ref 0.5–1.3)
CREAT SERPL-MCNC: 2.23 MG/DL — HIGH (ref 0.5–1.3)
DIFF PNL FLD: NEGATIVE — SIGNIFICANT CHANGE UP
DIFF PNL FLD: NEGATIVE — SIGNIFICANT CHANGE UP
EGFR: 31 ML/MIN/1.73M2 — LOW
EGFR: 31 ML/MIN/1.73M2 — LOW
GLUCOSE SERPL-MCNC: 109 MG/DL — HIGH (ref 70–99)
GLUCOSE SERPL-MCNC: 109 MG/DL — HIGH (ref 70–99)
GLUCOSE UR QL: NEGATIVE MG/DL — SIGNIFICANT CHANGE UP
GLUCOSE UR QL: NEGATIVE MG/DL — SIGNIFICANT CHANGE UP
HCT VFR BLD CALC: 25.3 % — LOW (ref 39–50)
HCT VFR BLD CALC: 25.3 % — LOW (ref 39–50)
HGB BLD-MCNC: 8.3 G/DL — LOW (ref 13–17)
HGB BLD-MCNC: 8.3 G/DL — LOW (ref 13–17)
KETONES UR-MCNC: NEGATIVE MG/DL — SIGNIFICANT CHANGE UP
KETONES UR-MCNC: NEGATIVE MG/DL — SIGNIFICANT CHANGE UP
LEUKOCYTE ESTERASE UR-ACNC: NEGATIVE — SIGNIFICANT CHANGE UP
LEUKOCYTE ESTERASE UR-ACNC: NEGATIVE — SIGNIFICANT CHANGE UP
MAGNESIUM SERPL-MCNC: 2 MG/DL — SIGNIFICANT CHANGE UP (ref 1.6–2.6)
MAGNESIUM SERPL-MCNC: 2 MG/DL — SIGNIFICANT CHANGE UP (ref 1.6–2.6)
MCHC RBC-ENTMCNC: 30.3 PG — SIGNIFICANT CHANGE UP (ref 27–34)
MCHC RBC-ENTMCNC: 30.3 PG — SIGNIFICANT CHANGE UP (ref 27–34)
MCHC RBC-ENTMCNC: 32.8 GM/DL — SIGNIFICANT CHANGE UP (ref 32–36)
MCHC RBC-ENTMCNC: 32.8 GM/DL — SIGNIFICANT CHANGE UP (ref 32–36)
MCV RBC AUTO: 92.3 FL — SIGNIFICANT CHANGE UP (ref 80–100)
MCV RBC AUTO: 92.3 FL — SIGNIFICANT CHANGE UP (ref 80–100)
NITRITE UR-MCNC: NEGATIVE — SIGNIFICANT CHANGE UP
NITRITE UR-MCNC: NEGATIVE — SIGNIFICANT CHANGE UP
NRBC # BLD: 0 /100 WBCS — SIGNIFICANT CHANGE UP (ref 0–0)
NRBC # BLD: 0 /100 WBCS — SIGNIFICANT CHANGE UP (ref 0–0)
OSMOLALITY UR: 539 MOSM/KG — SIGNIFICANT CHANGE UP (ref 300–900)
OSMOLALITY UR: 539 MOSM/KG — SIGNIFICANT CHANGE UP (ref 300–900)
PH UR: 5.5 — SIGNIFICANT CHANGE UP (ref 5–8)
PH UR: 5.5 — SIGNIFICANT CHANGE UP (ref 5–8)
PLATELET # BLD AUTO: 349 K/UL — SIGNIFICANT CHANGE UP (ref 150–400)
PLATELET # BLD AUTO: 349 K/UL — SIGNIFICANT CHANGE UP (ref 150–400)
POTASSIUM SERPL-MCNC: 4.3 MMOL/L — SIGNIFICANT CHANGE UP (ref 3.5–5.3)
POTASSIUM SERPL-MCNC: 4.3 MMOL/L — SIGNIFICANT CHANGE UP (ref 3.5–5.3)
POTASSIUM SERPL-SCNC: 4.3 MMOL/L — SIGNIFICANT CHANGE UP (ref 3.5–5.3)
POTASSIUM SERPL-SCNC: 4.3 MMOL/L — SIGNIFICANT CHANGE UP (ref 3.5–5.3)
POTASSIUM UR-SCNC: 46 MMOL/L — SIGNIFICANT CHANGE UP
POTASSIUM UR-SCNC: 46 MMOL/L — SIGNIFICANT CHANGE UP
PROT ?TM UR-MCNC: 14 MG/DL — HIGH (ref 0–12)
PROT ?TM UR-MCNC: 14 MG/DL — HIGH (ref 0–12)
PROT UR-MCNC: SIGNIFICANT CHANGE UP MG/DL
PROT UR-MCNC: SIGNIFICANT CHANGE UP MG/DL
PROT/CREAT UR-RTO: 0.1 RATIO — SIGNIFICANT CHANGE UP (ref 0–0.2)
PROT/CREAT UR-RTO: 0.1 RATIO — SIGNIFICANT CHANGE UP (ref 0–0.2)
PTH RELATED PROT SERPL-MCNC: <2 PMOL/L — SIGNIFICANT CHANGE UP
PTH RELATED PROT SERPL-MCNC: <2 PMOL/L — SIGNIFICANT CHANGE UP
RBC # BLD: 2.74 M/UL — LOW (ref 4.2–5.8)
RBC # BLD: 2.74 M/UL — LOW (ref 4.2–5.8)
RBC # FLD: 18.4 % — HIGH (ref 10.3–14.5)
RBC # FLD: 18.4 % — HIGH (ref 10.3–14.5)
SODIUM SERPL-SCNC: 139 MMOL/L — SIGNIFICANT CHANGE UP (ref 135–145)
SODIUM SERPL-SCNC: 139 MMOL/L — SIGNIFICANT CHANGE UP (ref 135–145)
SODIUM UR-SCNC: 56 MMOL/L — SIGNIFICANT CHANGE UP
SODIUM UR-SCNC: 56 MMOL/L — SIGNIFICANT CHANGE UP
SP GR SPEC: 1.02 — SIGNIFICANT CHANGE UP (ref 1–1.03)
SP GR SPEC: 1.02 — SIGNIFICANT CHANGE UP (ref 1–1.03)
UROBILINOGEN FLD QL: 0.2 MG/DL — SIGNIFICANT CHANGE UP (ref 0.2–1)
UROBILINOGEN FLD QL: 0.2 MG/DL — SIGNIFICANT CHANGE UP (ref 0.2–1)
UUN UR-MCNC: 846 MG/DL — SIGNIFICANT CHANGE UP
UUN UR-MCNC: 846 MG/DL — SIGNIFICANT CHANGE UP
WBC # BLD: 13.2 K/UL — HIGH (ref 3.8–10.5)
WBC # BLD: 13.2 K/UL — HIGH (ref 3.8–10.5)
WBC # FLD AUTO: 13.2 K/UL — HIGH (ref 3.8–10.5)
WBC # FLD AUTO: 13.2 K/UL — HIGH (ref 3.8–10.5)

## 2024-01-13 PROCEDURE — 71250 CT THORAX DX C-: CPT | Mod: 26

## 2024-01-13 PROCEDURE — 99232 SBSQ HOSP IP/OBS MODERATE 35: CPT

## 2024-01-13 PROCEDURE — 71045 X-RAY EXAM CHEST 1 VIEW: CPT | Mod: 26

## 2024-01-13 RX ORDER — OXYCODONE HYDROCHLORIDE 5 MG/1
5 TABLET ORAL EVERY 4 HOURS
Refills: 0 | Status: DISCONTINUED | OUTPATIENT
Start: 2024-01-13 | End: 2024-01-17

## 2024-01-13 RX ORDER — SODIUM CHLORIDE 9 MG/ML
1000 INJECTION, SOLUTION INTRAVENOUS
Refills: 0 | Status: DISCONTINUED | OUTPATIENT
Start: 2024-01-13 | End: 2024-01-17

## 2024-01-13 RX ADMIN — HEPARIN SODIUM 5000 UNIT(S): 5000 INJECTION INTRAVENOUS; SUBCUTANEOUS at 21:15

## 2024-01-13 RX ADMIN — Medication 325 MILLIGRAM(S): at 17:28

## 2024-01-13 RX ADMIN — ATORVASTATIN CALCIUM 10 MILLIGRAM(S): 80 TABLET, FILM COATED ORAL at 21:16

## 2024-01-13 RX ADMIN — Medication 325 MILLIGRAM(S): at 05:59

## 2024-01-13 RX ADMIN — Medication 975 MILLIGRAM(S): at 18:41

## 2024-01-13 RX ADMIN — OXYCODONE HYDROCHLORIDE 5 MILLIGRAM(S): 5 TABLET ORAL at 11:13

## 2024-01-13 RX ADMIN — Medication 975 MILLIGRAM(S): at 18:23

## 2024-01-13 RX ADMIN — FAMOTIDINE 10 MILLIGRAM(S): 10 INJECTION INTRAVENOUS at 11:44

## 2024-01-13 RX ADMIN — OXYCODONE HYDROCHLORIDE 5 MILLIGRAM(S): 5 TABLET ORAL at 08:44

## 2024-01-13 RX ADMIN — Medication 50 MILLIGRAM(S): at 11:44

## 2024-01-13 RX ADMIN — HEPARIN SODIUM 5000 UNIT(S): 5000 INJECTION INTRAVENOUS; SUBCUTANEOUS at 05:59

## 2024-01-13 RX ADMIN — Medication 100 MILLIGRAM(S): at 17:28

## 2024-01-13 RX ADMIN — Medication 25 MILLIGRAM(S): at 08:39

## 2024-01-13 RX ADMIN — Medication 25 MILLIGRAM(S): at 00:42

## 2024-01-13 RX ADMIN — Medication 300 MILLIGRAM(S): at 11:44

## 2024-01-13 RX ADMIN — HEPARIN SODIUM 5000 UNIT(S): 5000 INJECTION INTRAVENOUS; SUBCUTANEOUS at 13:43

## 2024-01-13 RX ADMIN — Medication 100 MILLIGRAM(S): at 05:59

## 2024-01-13 RX ADMIN — Medication 60 MILLIGRAM(S): at 05:59

## 2024-01-13 RX ADMIN — Medication 100 MILLIGRAM(S): at 10:05

## 2024-01-13 RX ADMIN — Medication 25 MILLIGRAM(S): at 16:10

## 2024-01-13 NOTE — PROGRESS NOTE ADULT - ASSESSMENT
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 Hospital course c/b NARA non oliguric initially thought to be prerenal/ATN with partial response to IVF down to 2.1 ranges. However, gradual uptrend noted starting on 1/8  (2.23-2.0-2.1) w/ highest SCr noted on 1/11 at 2.43 in addition to peripheral Eosinophilia 13.3% on 1/10 suggesting additional renal insult such as AIN drug induced     1. Non oliguric NARA sec to prerenal/ATN vs possible AIN - initial renal insult attributed to ATN/pre renal etiology which responded to IVF trial w/ SCr down to 2.1 ranges  However gradual uptrend noted on renal function up to 2.43 ranges in the setting of normal renal US, stable hemodynamics, bland UA and peripheral Eosinophilia suggesting acute interstitial nephritis (drug induced) possibly related to rifampin use (started on 1/6)  Empirically started on prednisone 60mg because of these findings on 1/11 w/ most recent SCr at 2.1  Discussed w/ ID and since Rifampin is a crucial anti-tb medication, stopping this medication will lead to poor clinical outcomes, it was decided to c/w this agent in addition to PO steroids.   C/w prednisone 60mg PO daily (day #2)   C/w strict I/O for monitoring  c/w PUD ppx w/ H2 antagonist   Adjust and dose all medications to current GFR  Work up for other potential disease mimics unremarkable   Trend BMP for additional renal function monitoring     2. Hx of HTN -  c/w hydralazine 25mg TID and Labetalol 100mg PO BID    3. Hypercalcemia w/ suppressed PTH - in the setting of on going granulomatous disease (rest of hyperCa+ work up unremarkable)  Maintain adequate volume status  Maintain low calcium intake (no more than 400 mg/day) and low oxalate diet   Avoid dietary vitamin D supplements  Expect gradual correction of Ca+ levels as treatment of the underlying disease continues    4. 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 Hospital course c/b NARA non oliguric initially thought to be prerenal/ATN with partial response to IVF down to 2.1 ranges. However, gradual uptrend noted starting on 1/8  (2.23-2.0-2.1) w/ highest SCr noted on 1/11 at 2.43 in addition to peripheral Eosinophilia 13.3% on 1/10 suggesting additional renal insult such as AIN drug induced     1. Non oliguric NARA sec to prerenal/ATN vs possible AIN - initial renal insult attributed to ATN/pre renal etiology which responded to IVF trial w/ SCr down to 2.1 ranges  However gradual uptrend noted on renal function up to 2.43 ranges in the setting of normal renal US, stable hemodynamics, bland UA and peripheral Eosinophilia suggesting acute interstitial nephritis (drug induced) possibly related to rifampin use (started on 1/6)  Empirically started on prednisone 60mg because of these findings on 1/11 w/ most recent SCr at 2.3  Discussed w/ ID and since Rifampin is a crucial anti-tb medication, stopping this medication will lead to poor clinical outcomes, it was decided to c/w this agent in addition to PO steroids.   C/w prednisone 60mg PO daily (day #3)   C/w strict I/O for monitoring  c/w PUD ppx w/ H2 antagonist   Adjust and dose all medications to current GFR  Work up for other potential disease mimics unremarkable   Trend BMP for additional renal function monitoring   Repeat UA with lytes today    2. Hx of HTN -  c/w hydralazine 25mg TID and Labetalol 100mg PO BID    3. Hypercalcemia w/ suppressed PTH - in the setting of on going granulomatous disease (rest of hyperCa+ work up unremarkable)  Maintain adequate volume status  Maintain low calcium intake (no more than 400 mg/day) and low oxalate diet   Avoid dietary vitamin D supplements  Expect gradual correction of Ca+ levels as treatment of the underlying disease continues    4. CKD Stage III - not yet at baseline  c/w Bicarb 130mg PO TID   C/w to avoid NSAIDs and other nephrotoxins     Discussed in detail with ID and primary team, will continue to monitor closely with you

## 2024-01-13 NOTE — PROGRESS NOTE ADULT - ASSESSMENT
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  On RIPE since 1/6.    74yo Cantonese speaking M h/o HTN, HLD p/w evaluation of pleural effusion, s/p decortication with biopsy on 1/5, found to have necrotizing granuloma and found to have pulmonary TB (pleural tissue MTB PCR+).  On RIPE since 1/6.    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  On RIPE since 1/6.   Course c/b NARA on CKD.  Currently Cr stable.    - cont Rifampin 600 mg PO daily  - cont  mg PO daily  - cont Levaquin 750mg PO q 48 hours  - cont pyrazinamide 1500 mg PO three times a week (M, W, F)  - cont  vitamin B6 50 mg PO daily  - cont airborne precautions   - Follow up AFB sputum cultures   - Reported to [facility] 1/8/24. They will make home assessment and clear patient for discharge.      Team 1 will follow you.  Dr Alcala will resume care on Monday.  Case d/w primary team.    Estephanie Arora MD, MS  Infectious Disease attending  office phone [phone]  For any questions during evening/weekend/holiday, please page ID on call    74yo Cantonese speaking M h/o HTN, HLD p/w evaluation of pleural effusion, s/p decortication with biopsy on 1/5, found to have necrotizing granuloma and found to have pulmonary TB (pleural tissue MTB PCR+).  On RIPE since 1/6.   Course c/b NARA on CKD.  Currently Cr stable.    - cont Rifampin 600 mg PO daily  - cont  mg PO daily  - cont Levaquin 750mg PO q 48 hours  - cont pyrazinamide 1500 mg PO three times a week (M, W, F)  - cont  vitamin B6 50 mg PO daily  - cont airborne precautions   - Follow up AFB sputum cultures   - Reported to [facility] 1/8/24. They will make home assessment and clear patient for discharge.      Team 1 will follow you.  Dr Alcala will resume care on Monday.  Case d/w primary team.    Estephanie Arora MD, MS  Infectious Disease attending  office phone [phone]  For any questions during evening/weekend/holiday, please page ID on call

## 2024-01-13 NOTE — PROGRESS NOTE ADULT - SUBJECTIVE AND OBJECTIVE BOX
INFECTIOUS DISEASES CONSULT FOLLOW-UP NOTE    INTERVAL HPI/OVERNIGHT EVENTS:  no event overnight  patient feels well  denied CP, SOB, n/v/d     ROS:   Constitutional, eyes, ENT, cardiovascular, respiratory, gastrointestinal, genitourinary, integumentary, neurological, psychiatric and heme/lymph are otherwise negative other than noted above       ANTIBIOTICS/RELEVANT:    MEDICATIONS  (STANDING):  atorvastatin 10 milliGRAM(s) Oral at bedtime  bisacodyl 5 milliGRAM(s) Oral at bedtime  famotidine    Tablet 10 milliGRAM(s) Oral daily  heparin   Injectable 5000 Unit(s) SubCutaneous every 8 hours  hydrALAZINE 25 milliGRAM(s) Oral every 8 hours  isoniazid 300 milliGRAM(s) Oral daily  labetalol 100 milliGRAM(s) Oral three times a day  lactated ringers. 1000 milliLiter(s) (50 mL/Hr) IV Continuous <Continuous>  levoFLOXacin  Tablet 750 milliGRAM(s) Oral every 48 hours  polyethylene glycol 3350 17 Gram(s) Oral daily  predniSONE   Tablet 60 milliGRAM(s) Oral daily  pyrazinamide 1500 milliGRAM(s) Oral <User Schedule>  pyridoxine 50 milliGRAM(s) Oral daily  rifAMPin 600 milliGRAM(s) Oral daily  senna 2 Tablet(s) Oral at bedtime  sodium bicarbonate 325 milliGRAM(s) Oral two times a day    MEDICATIONS  (PRN):  acetaminophen     Tablet .. 975 milliGRAM(s) Oral every 6 hours PRN Mild Pain (1 - 3)  oxyCODONE    IR 5 milliGRAM(s) Oral every 4 hours PRN Moderate Pain (4 - 6)        Vital Signs Last 24 Hrs  T(C): 36.3 (2024 13:53), Max: 36.5 (2024 08:50)  T(F): 97.4 (2024 13:53), Max: 97.7 (2024 08:50)  HR: 110 (2024 12:21) (104 - 114)  BP: 117/59 (2024 12:21) (117/59 - 159/79)  BP(mean): 80 (2024 12:21) (80 - 110)  RR: 16 (2024 12:21) (16 - 18)  SpO2: 98% (2024 12:21) (95% - 98%)    Parameters below as of 2024 12:21  Patient On (Oxygen Delivery Method): room air        24 @ 07:  -  24 @ 07:00  --------------------------------------------------------  IN: 1200 mL / OUT: 931 mL / NET: 269 mL    24 @ 07:  -  24 @ 14:43  --------------------------------------------------------  IN: 300 mL / OUT: 500 mL / NET: -200 mL      PHYSICAL EXAM:  Constitutional: alert, NAD  Eyes: the sclera and conjunctiva were normal.   ENT: the ears and nose were normal in appearance.   Neck: the appearance of the neck was normal and the neck was supple.   Pulmonary: no respiratory distress and lungs were clear to auscultation bilaterally.  R chest tube  Heart: heart rate was normal and rhythm regular, normal S1 and S2  Vascular:. there was no peripheral edema  Abdomen: normal bowel sounds, soft, non-tender        LABS:                        8.3    13.20 )-----------( 349      ( 2024 05:30 )             25.3     -13    139  |  111<H>  |  48<H>  ----------------------------<  109<H>  4.3   |  18<L>  |  2.23<H>    Ca    10.3      2024 05:30  Phos  4.1     -12  Mg     2.0     -13      PT/INR - ( 2024 07:29 )   PT: 12.5 sec;   INR: 1.10          PTT - ( 2024 07:29 )  PTT:22.6 sec  Urinalysis Basic - ( 2024 11:30 )    Color: Yellow / Appearance: Clear / S.018 / pH: x  Gluc: x / Ketone: Negative mg/dL  / Bili: Negative / Urobili: 0.2 mg/dL   Blood: x / Protein: Trace mg/dL / Nitrite: Negative   Leuk Esterase: Negative / RBC: x / WBC x   Sq Epi: x / Non Sq Epi: x / Bacteria: x        MICROBIOLOGY:      RADIOLOGY & ADDITIONAL STUDIES:  Reviewed

## 2024-01-13 NOTE — PROGRESS NOTE ADULT - SUBJECTIVE AND OBJECTIVE BOX
Patient discussed on morning rounds with Dr. Kaiser      Operation / Date: 1/5/2024, R VATS RA Pleural Biopsy and Decortication    SUBJECTIVE ASSESSMENT:  73y Male complaining of pain over R chest specifically blow hole site. Denies any fevers, chills, headache, lightheadedness, dizziness, chest pain, shortness of breath, abdominal pain, nausea, vomiting, changes in bowel or bladder, paresthesias, or any other acute complaint.          Vital Signs Last 24 Hrs  T(C): 36.5 (13 Jan 2024 08:50), Max: 36.5 (13 Jan 2024 08:50)  T(F): 97.7 (13 Jan 2024 08:50), Max: 97.7 (13 Jan 2024 08:50)  HR: 114 (13 Jan 2024 08:37) (100 - 114)  BP: 129/64 (13 Jan 2024 08:37) (118/56 - 159/79)  BP(mean): 88 (13 Jan 2024 08:37) (78 - 110)  RR: 17 (13 Jan 2024 08:37) (17 - 18)  SpO2: 97% (13 Jan 2024 08:37) (95% - 98%)    Parameters below as of 13 Jan 2024 08:37  Patient On (Oxygen Delivery Method): room air      I&O's Detail    12 Jan 2024 07:01  -  13 Jan 2024 07:00  --------------------------------------------------------  IN:    Sodium Chloride 0.9% Bolus: 1200 mL  Total IN: 1200 mL    OUT:    Chest Tube (mL): 30 mL    VAC (Vacuum Assisted Closure) System (mL): 0 mL    Voided (mL): 901 mL  Total OUT: 931 mL    Total NET: 269 mL      13 Jan 2024 07:01  -  13 Jan 2024 11:13  --------------------------------------------------------  IN:    Lactated Ringers: 150 mL    Sodium Chloride 0.9% Bolus: 50 mL  Total IN: 200 mL    OUT:    Chest Tube (mL): 0 mL    VAC (Vacuum Assisted Closure) System (mL): 0 mL    Voided (mL): 300 mL  Total OUT: 300 mL    Total NET: -100 mL          CHEST TUBE:  YES, no leak, on suction    DEON DRAIN:  No.  EPICARDIAL WIRES: No.  TIE DOWNS: Yes  TRUJILLO: Yes/No.    PHYSICAL EXAM:    General:     Neurological:    Cardiovascular:    Respiratory:    Gastrointestinal:    Extremities:    Vascular:    Incision Sites:    LABS:                        8.3    13.20 )-----------( 349      ( 13 Jan 2024 05:30 )             25.3       COUMADIN:  Yes/No. REASON: .    PT/INR - ( 12 Jan 2024 07:29 )   PT: 12.5 sec;   INR: 1.10          PTT - ( 12 Jan 2024 07:29 )  PTT:22.6 sec    01-13    139  |  111<H>  |  48<H>  ----------------------------<  109<H>  4.3   |  18<L>  |  2.23<H>    Ca    10.3      13 Jan 2024 05:30  Phos  4.1     01-12  Mg     2.0     01-13        Urinalysis Basic - ( 13 Jan 2024 05:30 )    Color: x / Appearance: x / SG: x / pH: x  Gluc: 109 mg/dL / Ketone: x  / Bili: x / Urobili: x   Blood: x / Protein: x / Nitrite: x   Leuk Esterase: x / RBC: x / WBC x   Sq Epi: x / Non Sq Epi: x / Bacteria: x        MEDICATIONS  (STANDING):  atorvastatin 10 milliGRAM(s) Oral at bedtime  bisacodyl 5 milliGRAM(s) Oral at bedtime  famotidine    Tablet 10 milliGRAM(s) Oral daily  heparin   Injectable 5000 Unit(s) SubCutaneous every 8 hours  hydrALAZINE 25 milliGRAM(s) Oral every 8 hours  isoniazid 300 milliGRAM(s) Oral daily  labetalol 100 milliGRAM(s) Oral three times a day  lactated ringers. 1000 milliLiter(s) (50 mL/Hr) IV Continuous <Continuous>  levoFLOXacin  Tablet 750 milliGRAM(s) Oral every 48 hours  polyethylene glycol 3350 17 Gram(s) Oral daily  predniSONE   Tablet 60 milliGRAM(s) Oral daily  pyrazinamide 1500 milliGRAM(s) Oral <User Schedule>  pyridoxine 50 milliGRAM(s) Oral daily  rifAMPin 600 milliGRAM(s) Oral daily  senna 2 Tablet(s) Oral at bedtime  sodium bicarbonate 325 milliGRAM(s) Oral two times a day    MEDICATIONS  (PRN):  acetaminophen     Tablet .. 975 milliGRAM(s) Oral every 6 hours PRN Mild Pain (1 - 3)  oxyCODONE    IR 5 milliGRAM(s) Oral every 4 hours PRN Moderate Pain (4 - 6)        RADIOLOGY & ADDITIONAL TESTS:     Patient discussed on morning rounds with Dr. Kaiser      Operation / Date: 1/5/2024, R VATS RA Pleural Biopsy and Decortication    SUBJECTIVE ASSESSMENT:  73y Male complaining of pain over R chest specifically blow hole site. Denies any fevers, chills, headache, lightheadedness, dizziness, chest pain, shortness of breath, abdominal pain, nausea, vomiting, changes in bowel or bladder, paresthesias, or any other acute complaint.      Vital Signs Last 24 Hrs  T(C): 36.5 (13 Jan 2024 08:50), Max: 36.5 (13 Jan 2024 08:50)  T(F): 97.7 (13 Jan 2024 08:50), Max: 97.7 (13 Jan 2024 08:50)  HR: 114 (13 Jan 2024 08:37) (100 - 114)  BP: 129/64 (13 Jan 2024 08:37) (118/56 - 159/79)  BP(mean): 88 (13 Jan 2024 08:37) (78 - 110)  RR: 17 (13 Jan 2024 08:37) (17 - 18)  SpO2: 97% (13 Jan 2024 08:37) (95% - 98%)    Parameters below as of 13 Jan 2024 08:37  Patient On (Oxygen Delivery Method): room air      I&O's Detail    12 Jan 2024 07:01  -  13 Jan 2024 07:00  --------------------------------------------------------  IN:    Sodium Chloride 0.9% Bolus: 1200 mL  Total IN: 1200 mL    OUT:    Chest Tube (mL): 30 mL    VAC (Vacuum Assisted Closure) System (mL): 0 mL    Voided (mL): 901 mL  Total OUT: 931 mL    Total NET: 269 mL      13 Jan 2024 07:01  -  13 Jan 2024 11:13  --------------------------------------------------------  IN:    Lactated Ringers: 150 mL    Sodium Chloride 0.9% Bolus: 50 mL  Total IN: 200 mL    OUT:    Chest Tube (mL): 0 mL    VAC (Vacuum Assisted Closure) System (mL): 0 mL    Voided (mL): 300 mL  Total OUT: 300 mL    Total NET: -100 mL          CHEST TUBE:  YES, no leak, on suction    DEON DRAIN:  No.  EPICARDIAL WIRES: No.  TIE DOWNS: Yes  TERRY: Yes/No.    PHYSICAL EXAM:    General: Sitting in bed comfortably in NAD  Neuro: A&Ox3, no focal deficits   HEENT: NCAT, EOMI. Crepitus appreciate in b/l cheeks   Cardiac: Regular rate and rhythm, normal S1 and S2. No m/r/g   Pulm: Breathing comfortably on RA. No signs of respiratory distress. Lungs are CTA b/l without wheezes, rales, or rhonchi. Chest tube in place to suction with no leak. Wound vac in blow hole with air being expressed. Crepitus tracking to diaphragmatic margin    Abdomen: Soft, non-distended, non-tender.   : No terry, no crepitus appreciated in scrotum or genitalia   Extremities: Warm and well perfused, no peripheral edema, distal pulses 2+. No calf tenderness. SCDs and TEDs in place  MSK: Full AROM   Wound: VATS incision c/d/o       LABS:                        8.3    13.20 )-----------( 349      ( 13 Jan 2024 05:30 )             25.3       PT/INR - ( 12 Jan 2024 07:29 )   PT: 12.5 sec;   INR: 1.10          PTT - ( 12 Jan 2024 07:29 )  PTT:22.6 sec    01-13    139  |  111<H>  |  48<H>  ----------------------------<  109<H>  4.3   |  18<L>  |  2.23<H>    Ca    10.3      13 Jan 2024 05:30  Phos  4.1     01-12  Mg     2.0     01-13        Urinalysis Basic - ( 13 Jan 2024 05:30 )    Color: x / Appearance: x / SG: x / pH: x  Gluc: 109 mg/dL / Ketone: x  / Bili: x / Urobili: x   Blood: x / Protein: x / Nitrite: x   Leuk Esterase: x / RBC: x / WBC x   Sq Epi: x / Non Sq Epi: x / Bacteria: x        MEDICATIONS  (STANDING):  atorvastatin 10 milliGRAM(s) Oral at bedtime  bisacodyl 5 milliGRAM(s) Oral at bedtime  famotidine    Tablet 10 milliGRAM(s) Oral daily  heparin   Injectable 5000 Unit(s) SubCutaneous every 8 hours  hydrALAZINE 25 milliGRAM(s) Oral every 8 hours  isoniazid 300 milliGRAM(s) Oral daily  labetalol 100 milliGRAM(s) Oral three times a day  lactated ringers. 1000 milliLiter(s) (50 mL/Hr) IV Continuous <Continuous>  levoFLOXacin  Tablet 750 milliGRAM(s) Oral every 48 hours  polyethylene glycol 3350 17 Gram(s) Oral daily  predniSONE   Tablet 60 milliGRAM(s) Oral daily  pyrazinamide 1500 milliGRAM(s) Oral <User Schedule>  pyridoxine 50 milliGRAM(s) Oral daily  rifAMPin 600 milliGRAM(s) Oral daily  senna 2 Tablet(s) Oral at bedtime  sodium bicarbonate 325 milliGRAM(s) Oral two times a day    MEDICATIONS  (PRN):  acetaminophen     Tablet .. 975 milliGRAM(s) Oral every 6 hours PRN Mild Pain (1 - 3)  oxyCODONE    IR 5 milliGRAM(s) Oral every 4 hours PRN Moderate Pain (4 - 6)        RADIOLOGY & ADDITIONAL TESTS:  < from: Xray Chest 1 View- PORTABLE-Routine (Xray Chest 1 View- PORTABLE-Routine in AM.) (01.13.24 @ 06:31) >  INTERPRETATION:  Clinical History: Postop    Frontal examination of the chest demonstrates no interval change in   comparison to prior examination of the chest 1/12/2024.    IMPRESSION: No interval change    < end of copied text >

## 2024-01-13 NOTE — PROGRESS NOTE ADULT - SUBJECTIVE AND OBJECTIVE BOX
Overnight Events:    Subjective: Patient seen and examined at bedside.    [OBJECTIVE]:    Vital Signs:  T(F): , Max: 97.7 (24 @ 08:50)  HR:  (104 - 114)  BP:  (117/59 - 159/79)  BP(mean):  (80 - 110)  ABP: --  ABP(mean): --  RR:  (16 - 18)  SpO2:  (95% - 98%)  CVP(mm Hg): --  CVP(cm H2O): --       @ 07:01  -   @ 07:00  --------------------------------------------------------  IN: 1200 mL / OUT: 931 mL / NET: 269 mL     @ 07:01  -   @ 14:02  --------------------------------------------------------  IN: 300 mL / OUT: 500 mL / NET: -200 mL      CAPILLARY BLOOD GLUCOSE          Physical Exam:  T(F): 97.4 (24 @ 13:53)  HR: 110 (24 @ 12:21)  BP: 117/59 (24 @ 12:21)  RR: 16 (24 @ 12:21)  SpO2: 98% (24 @ 12:21)  Wt(kg): --    General: AO x 3, NAD, Comfortable, Pleasant, Anxious, Agitated, Ill, Frail, Cachectic, Resp distress  HEENT: PERRL/ EOMI, no scleral icterus, no ptosis, MMM, no JVD, no thyromegaly  Respiratory: CTA b/l, no wheezes, rales or rhonchi  Cardiovascular: Regular, +S1 + S2  Abdomen: Soft, NTND, normoactive bowel sounds, no rebound, no guarding, no suprapubic tenderness  Extremities: No cyanosis, no clubbing, no edema, pulses equal, no calf tenderness  Skin: No rashes  Lymphatic: No cervical/supraclavicular LAD  Neurological: CN II-XII grossly intact, follows commands, moves all extremities    Medications:  MEDICATIONS  (STANDING):  atorvastatin 10 milliGRAM(s) Oral at bedtime  bisacodyl 5 milliGRAM(s) Oral at bedtime  famotidine    Tablet 10 milliGRAM(s) Oral daily  heparin   Injectable 5000 Unit(s) SubCutaneous every 8 hours  hydrALAZINE 25 milliGRAM(s) Oral every 8 hours  isoniazid 300 milliGRAM(s) Oral daily  labetalol 100 milliGRAM(s) Oral three times a day  lactated ringers. 1000 milliLiter(s) (50 mL/Hr) IV Continuous <Continuous>  levoFLOXacin  Tablet 750 milliGRAM(s) Oral every 48 hours  polyethylene glycol 3350 17 Gram(s) Oral daily  predniSONE   Tablet 60 milliGRAM(s) Oral daily  pyrazinamide 1500 milliGRAM(s) Oral <User Schedule>  pyridoxine 50 milliGRAM(s) Oral daily  rifAMPin 600 milliGRAM(s) Oral daily  senna 2 Tablet(s) Oral at bedtime  sodium bicarbonate 325 milliGRAM(s) Oral two times a day    MEDICATIONS  (PRN):  acetaminophen     Tablet .. 975 milliGRAM(s) Oral every 6 hours PRN Mild Pain (1 - 3)  oxyCODONE    IR 5 milliGRAM(s) Oral every 4 hours PRN Moderate Pain (4 - 6)      Allergies:  Allergies    No Known Allergies    Intolerances        Labs:                        8.3    13.20 )-----------( 349      ( 2024 05:30 )             25.3     -    139  |  111<H>  |  48<H>  ----------------------------<  109<H>  4.3   |  18<L>  |  2.23<H>    Ca    10.3      2024 05:30  Phos  4.1     -12  Mg     2.0     -13      PT/INR - ( 2024 07:29 )   PT: 12.5 sec;   INR: 1.10          PTT - ( 2024 07:29 )  PTT:22.6 sec  Urinalysis Basic - ( 2024 11:30 )    Color: Yellow / Appearance: Clear / S.018 / pH: x  Gluc: x / Ketone: Negative mg/dL  / Bili: Negative / Urobili: 0.2 mg/dL   Blood: x / Protein: Trace mg/dL / Nitrite: Negative   Leuk Esterase: Negative / RBC: x / WBC x   Sq Epi: x / Non Sq Epi: x / Bacteria: x        Radiology and other tests:   NEPHROLOGY PROGRESS NOTE    Subjective: Patient seen and examined at bedside. Spoke to daughter onthe phone at length who helped with translation as well, all questions answered. sCr stable but not improving as rapidly as hoped, will continue steroids, may require biopsy if will continue on abx. Discussed with ID, will need to check with [facility] on tuesday. Otherwise no acute issues, feels well, no cough, urine clears when not receiving rifampin nicely.     [OBJECTIVE]:    Vital Signs:  T(F): , Max: 97.7 (24 @ 08:50)  HR:  (104 - 114)  BP:  (117/59 - 159/79)  BP(mean):  (80 - 110)  ABP: --  ABP(mean): --  RR:  (16 - 18)  SpO2:  (95% - 98%)  CVP(mm Hg): --  CVP(cm H2O): --       @ 07:01  -   @ 07:00  --------------------------------------------------------  IN: 1200 mL / OUT: 931 mL / NET: 269 mL     @ 07:01  -   @ 14:02  --------------------------------------------------------  IN: 300 mL / OUT: 500 mL / NET: -200 mL      CAPILLARY BLOOD GLUCOSE          .  VITAL SIGNS:  T(F): 97.4 (24 @ 13:53), Max: 97.7 (24 @ 08:50)  HR: 110 (24 @ 12:21) (104 - 114)  BP: 117/59 (24 @ 12:21) (117/59 - 159/79)  BP(mean): 80 (24 @ 12:21) (80 - 110)  RR: 16 (24 @ 12:21) (16 - 18)  SpO2: 98% (01-13-24 @ 12:21) (95% - 98%)    PHYSICAL EXAM:  Constitutional: Resting comfortably in bed; NAD  HEENT:  EOMI, anicteric sclera; MMM  Respiratory: CTA B/L; no W/R/R, no accessory muscle use  Cardiac: +S1/S2; RRR; no M/R/G  Gastrointestinal: soft, NT/ND; no rebound or guarding; +BS  Extremities: WWP, no clubbing or cyanosis; no peripheral edema  Dermatologic: skin warm, dry and intact; no rashes, wounds, or scars  Access: None      Medications:  MEDICATIONS  (STANDING):  atorvastatin 10 milliGRAM(s) Oral at bedtime  bisacodyl 5 milliGRAM(s) Oral at bedtime  famotidine    Tablet 10 milliGRAM(s) Oral daily  heparin   Injectable 5000 Unit(s) SubCutaneous every 8 hours  hydrALAZINE 25 milliGRAM(s) Oral every 8 hours  isoniazid 300 milliGRAM(s) Oral daily  labetalol 100 milliGRAM(s) Oral three times a day  lactated ringers. 1000 milliLiter(s) (50 mL/Hr) IV Continuous <Continuous>  levoFLOXacin  Tablet 750 milliGRAM(s) Oral every 48 hours  polyethylene glycol 3350 17 Gram(s) Oral daily  predniSONE   Tablet 60 milliGRAM(s) Oral daily  pyrazinamide 1500 milliGRAM(s) Oral <User Schedule>  pyridoxine 50 milliGRAM(s) Oral daily  rifAMPin 600 milliGRAM(s) Oral daily  senna 2 Tablet(s) Oral at bedtime  sodium bicarbonate 325 milliGRAM(s) Oral two times a day    MEDICATIONS  (PRN):  acetaminophen     Tablet .. 975 milliGRAM(s) Oral every 6 hours PRN Mild Pain (1 - 3)  oxyCODONE    IR 5 milliGRAM(s) Oral every 4 hours PRN Moderate Pain (4 - 6)      Allergies:  Allergies    No Known Allergies    Intolerances        Labs:                        8.3    13. )-----------( 349      ( 2024 05:30 )             25.3         139  |  111<H>  |  48<H>  ----------------------------<  109<H>  4.3   |  18<L>  |  2.23<H>    Ca    10.3      2024 05:30  Phos  4.1       Mg     2.0           PT/INR - ( 2024 07:29 )   PT: 12.5 sec;   INR: 1.10          PTT - ( 2024 07:29 )  PTT:22.6 sec  Urinalysis Basic - ( 2024 11:30 )    Color: Yellow / Appearance: Clear / S.018 / pH: x  Gluc: x / Ketone: Negative mg/dL  / Bili: Negative / Urobili: 0.2 mg/dL   Blood: x / Protein: Trace mg/dL / Nitrite: Negative   Leuk Esterase: Negative / RBC: x / WBC x   Sq Epi: x / Non Sq Epi: x / Bacteria: x        Radiology and other tests:  Creatinine: 2.23 mg/dL (24 @ 05:30)  Creatinine: 2.19 mg/dL (24 @ 07:29)  Creatinine: 2.43 mg/dL (24 @ 05:30)  Creatinine: 2.16 mg/dL (01-10-24 @ 05:30)  Creatinine: 2.05 mg/dL (24 @ 05:30)  Creatinine: 2.23 mg/dL (24 @ 05:30)  Creatinine: 2.13 mg/dL (24 @ 05:30)  Creatinine: 2.31 mg/dL (24 @ 12:00)  Creatinine: 2.52 mg/dL (24 @ 06:08)  Creatinine: 2.53 mg/dL (24 @ 21:12)

## 2024-01-13 NOTE — PROGRESS NOTE ADULT - SUBJECTIVE AND OBJECTIVE BOX
GUILLAUME MANNING , [id number],  [facility] [address]    Time of encounter :    CC :    T(C): 36.5 (01-13-24 @ 08:50), Max: 36.5 (01-13-24 @ 08:50)  HR: 114 (01-13-24 @ 08:37) (100 - 114)  BP: 129/64 (01-13-24 @ 08:37) (118/56 - 159/79)  RR: 17 (01-13-24 @ 08:37) (17 - 18)  SpO2: 97% (01-13-24 @ 08:37) (95% - 98%)    acetaminophen     Tablet .. 975 milliGRAM(s) Oral every 6 hours PRN  atorvastatin 10 milliGRAM(s) Oral at bedtime  bisacodyl 5 milliGRAM(s) Oral at bedtime  famotidine    Tablet 10 milliGRAM(s) Oral daily  heparin   Injectable 5000 Unit(s) SubCutaneous every 8 hours  hydrALAZINE 25 milliGRAM(s) Oral every 8 hours  isoniazid 300 milliGRAM(s) Oral daily  labetalol 100 milliGRAM(s) Oral three times a day  lactated ringers. 1000 milliLiter(s) IV Continuous <Continuous>  levoFLOXacin  Tablet 750 milliGRAM(s) Oral every 48 hours  oxyCODONE    IR 5 milliGRAM(s) Oral every 6 hours PRN  polyethylene glycol 3350 17 Gram(s) Oral daily  predniSONE   Tablet 60 milliGRAM(s) Oral daily  pyrazinamide 1500 milliGRAM(s) Oral <User Schedule>  pyridoxine 50 milliGRAM(s) Oral daily  rifAMPin 600 milliGRAM(s) Oral daily  senna 2 Tablet(s) Oral at bedtime  sodium bicarbonate 325 milliGRAM(s) Oral two times a day      Physical Exam :    General exam :  well built, not in distress, saturating well on room air.  Eyes : EOMI, PERRL   CVS : RRR no murmur, JVP not elevated  Lungs : good air entry bilaterally   Abdomen : BS present, soft, not tender, not distended.  Extremities: no edema, no tenderness.  Neuro : AAO x 3 non focal.  Skin : warm and dry.   :  no terry.      CBC Full  -  ( 13 Jan 2024 05:30 )  WBC Count : 13.20 K/uL  RBC Count : 2.74 M/uL  Hemoglobin : 8.3 g/dL  Hematocrit : 25.3 %  Platelet Count - Automated : 349 K/uL  Mean Cell Volume : 92.3 fl  Mean Cell Hemoglobin : 30.3 pg  Mean Cell Hemoglobin Concentration : 32.8 gm/dL  Auto Neutrophil # : x  Auto Lymphocyte # : x  Auto Monocyte # : x  Auto Eosinophil # : x  Auto Basophil # : x  Auto Neutrophil % : x  Auto Lymphocyte % : x  Auto Monocyte % : x  Auto Eosinophil % : x  Auto Basophil % : x        01-13    139  |  111<H>  |  48<H>  ----------------------------<  109<H>  4.3   |  18<L>  |  2.23<H>    Ca    10.3      13 Jan 2024 05:30  Phos  4.1     01-12  Mg     2.0     01-13      Daily     Daily   CAPILLARY BLOOD GLUCOSE          Urinalysis Basic - ( 13 Jan 2024 05:30 )    Color: x / Appearance: x / SG: x / pH: x  Gluc: 109 mg/dL / Ketone: x  / Bili: x / Urobili: x   Blood: x / Protein: x / Nitrite: x   Leuk Esterase: x / RBC: x / WBC x   Sq Epi: x / Non Sq Epi: x / Bacteria: x        PT/INR - ( 12 Jan 2024 07:29 )   PT: 12.5 sec;   INR: 1.10          PTT - ( 12 Jan 2024 07:29 )  PTT:22.6 sec           GUILLAUME MANNING , [id number],  [facility] [address]    Time of encounter :    CC :    T(C): 36.5 (01-13-24 @ 08:50), Max: 36.5 (01-13-24 @ 08:50)  HR: 114 (01-13-24 @ 08:37) (100 - 114)  BP: 129/64 (01-13-24 @ 08:37) (118/56 - 159/79)  RR: 17 (01-13-24 @ 08:37) (17 - 18)  SpO2: 97% (01-13-24 @ 08:37) (95% - 98%)    acetaminophen     Tablet .. 975 milliGRAM(s) Oral every 6 hours PRN  atorvastatin 10 milliGRAM(s) Oral at bedtime  bisacodyl 5 milliGRAM(s) Oral at bedtime  famotidine    Tablet 10 milliGRAM(s) Oral daily  heparin   Injectable 5000 Unit(s) SubCutaneous every 8 hours  hydrALAZINE 25 milliGRAM(s) Oral every 8 hours  isoniazid 300 milliGRAM(s) Oral daily  labetalol 100 milliGRAM(s) Oral three times a day  lactated ringers. 1000 milliLiter(s) IV Continuous <Continuous>  levoFLOXacin  Tablet 750 milliGRAM(s) Oral every 48 hours  oxyCODONE    IR 5 milliGRAM(s) Oral every 6 hours PRN  polyethylene glycol 3350 17 Gram(s) Oral daily  predniSONE   Tablet 60 milliGRAM(s) Oral daily  pyrazinamide 1500 milliGRAM(s) Oral <User Schedule>  pyridoxine 50 milliGRAM(s) Oral daily  rifAMPin 600 milliGRAM(s) Oral daily  senna 2 Tablet(s) Oral at bedtime  sodium bicarbonate 325 milliGRAM(s) Oral two times a day      Physical Exam :    General exam :  well built, not in distress, saturating well on room air.  Eyes : EOMI, PERRL   CVS : RRR no murmur, JVP not elevated  Lungs : good air entry bilaterally   Abdomen : BS present, soft, not tender, not distended.  Extremities: no edema, no tenderness.  Neuro : AAO x 3 non focal.  Skin : warm and dry.   :  no terry.      CBC Full  -  ( 13 Jan 2024 05:30 )  WBC Count : 13.20 K/uL  RBC Count : 2.74 M/uL  Hemoglobin : 8.3 g/dL  Hematocrit : 25.3 %  Platelet Count - Automated : 349 K/uL  Mean Cell Volume : 92.3 fl  Mean Cell Hemoglobin : 30.3 pg  Mean Cell Hemoglobin Concentration : 32.8 gm/dL  Auto Neutrophil # : x  Auto Lymphocyte # : x  Auto Monocyte # : x  Auto Eosinophil # : x  Auto Basophil # : x  Auto Neutrophil % : x  Auto Lymphocyte % : x  Auto Monocyte % : x  Auto Eosinophil % : x  Auto Basophil % : x        01-13    139  |  111<H>  |  48<H>  ----------------------------<  109<H>  4.3   |  18<L>  |  2.23<H>    Ca    10.3      13 Jan 2024 05:30  Phos  4.1     01-12  Mg     2.0     01-13      Daily     Daily   CAPILLARY BLOOD GLUCOSE          Urinalysis Basic - ( 13 Jan 2024 05:30 )    Color: x / Appearance: x / SG: x / pH: x  Gluc: 109 mg/dL / Ketone: x  / Bili: x / Urobili: x   Blood: x / Protein: x / Nitrite: x   Leuk Esterase: x / RBC: x / WBC x   Sq Epi: x / Non Sq Epi: x / Bacteria: x        PT/INR - ( 12 Jan 2024 07:29 )   PT: 12.5 sec;   INR: 1.10          PTT - ( 12 Jan 2024 07:29 )  PTT:22.6 sec           GUILLAUME MANNING , [id number],  [facility] [address]    Time of encounter : 10 am   wife at bedside,   has blow hole on right chest - pig tail in place  subcutaneous emphysema around check /chest is less.   per wife he tolerated diet  still with some hoarseness of voice  saturating well on room air.    T(C): 36.5 (01-13-24 @ 08:50), Max: 36.5 (01-13-24 @ 08:50)  HR: 114 (01-13-24 @ 08:37) (100 - 114)  BP: 129/64 (01-13-24 @ 08:37) (118/56 - 159/79)  RR: 17 (01-13-24 @ 08:37) (17 - 18)  SpO2: 97% (01-13-24 @ 08:37) (95% - 98%)    acetaminophen     Tablet .. 975 milliGRAM(s) Oral every 6 hours PRN  atorvastatin 10 milliGRAM(s) Oral at bedtime  bisacodyl 5 milliGRAM(s) Oral at bedtime  famotidine    Tablet 10 milliGRAM(s) Oral daily  heparin   Injectable 5000 Unit(s) SubCutaneous every 8 hours  hydrALAZINE 25 milliGRAM(s) Oral every 8 hours  isoniazid 300 milliGRAM(s) Oral daily  labetalol 100 milliGRAM(s) Oral three times a day  lactated ringers. 1000 milliLiter(s) IV Continuous <Continuous>  levoFLOXacin  Tablet 750 milliGRAM(s) Oral every 48 hours  oxyCODONE    IR 5 milliGRAM(s) Oral every 6 hours PRN  polyethylene glycol 3350 17 Gram(s) Oral daily  predniSONE   Tablet 60 milliGRAM(s) Oral daily  pyrazinamide 1500 milliGRAM(s) Oral <User Schedule>  pyridoxine 50 milliGRAM(s) Oral daily  rifAMPin 600 milliGRAM(s) Oral daily  senna 2 Tablet(s) Oral at bedtime  sodium bicarbonate 325 milliGRAM(s) Oral two times a day      Physical Exam :    General exam :  saturating well on room air.   subcutaneous emphysema on check, neck , chest , arm - blow hole on right chest with wound vac on   pigtail chest tube on right   moving good air on left lung, reduce on right  bs present,   no edema, no tenderness on bilateral lower ext.  neuro -non focal.     CBC Full  -  ( 13 Jan 2024 05:30 )  WBC Count : 13.20 K/uL  RBC Count : 2.74 M/uL  Hemoglobin : 8.3 g/dL  Hematocrit : 25.3 %  Platelet Count - Automated : 349 K/uL  Mean Cell Volume : 92.3 fl  Mean Cell Hemoglobin : 30.3 pg  Mean Cell Hemoglobin Concentration : 32.8 gm/dL  Auto Neutrophil # : x  Auto Lymphocyte # : x  Auto Monocyte # : x  Auto Eosinophil # : x  Auto Basophil # : x  Auto Neutrophil % : x  Auto Lymphocyte % : x  Auto Monocyte % : x  Auto Eosinophil % : x  Auto Basophil % : x        01-13    139  |  111<H>  |  48<H>  ----------------------------<  109<H>  4.3   |  18<L>  |  2.23<H>    Ca    10.3      13 Jan 2024 05:30  Phos  4.1     01-12  Mg     2.0     01-13      Daily     Daily   CAPILLARY BLOOD GLUCOSE          Urinalysis Basic - ( 13 Jan 2024 05:30 )    Color: x / Appearance: x / SG: x / pH: x  Gluc: 109 mg/dL / Ketone: x  / Bili: x / Urobili: x   Blood: x / Protein: x / Nitrite: x   Leuk Esterase: x / RBC: x / WBC x   Sq Epi: x / Non Sq Epi: x / Bacteria: x        PT/INR - ( 12 Jan 2024 07:29 )   PT: 12.5 sec;   INR: 1.10          PTT - ( 12 Jan 2024 07:29 )  PTT:22.6 sec           GUILLAUME MANNING , [id number],  [facility] [address]    Time of encounter : 10 am   wife at bedside,   has blow hole on right chest - pig tail in place  subcutaneous emphysema around check /chest is less.   per wife he tolerated diet  still with some hoarseness of voice  saturating well on room air.    T(C): 36.5 (01-13-24 @ 08:50), Max: 36.5 (01-13-24 @ 08:50)  HR: 114 (01-13-24 @ 08:37) (100 - 114)  BP: 129/64 (01-13-24 @ 08:37) (118/56 - 159/79)  RR: 17 (01-13-24 @ 08:37) (17 - 18)  SpO2: 97% (01-13-24 @ 08:37) (95% - 98%)    acetaminophen     Tablet .. 975 milliGRAM(s) Oral every 6 hours PRN  atorvastatin 10 milliGRAM(s) Oral at bedtime  bisacodyl 5 milliGRAM(s) Oral at bedtime  famotidine    Tablet 10 milliGRAM(s) Oral daily  heparin   Injectable 5000 Unit(s) SubCutaneous every 8 hours  hydrALAZINE 25 milliGRAM(s) Oral every 8 hours  isoniazid 300 milliGRAM(s) Oral daily  labetalol 100 milliGRAM(s) Oral three times a day  lactated ringers. 1000 milliLiter(s) IV Continuous <Continuous>  levoFLOXacin  Tablet 750 milliGRAM(s) Oral every 48 hours  oxyCODONE    IR 5 milliGRAM(s) Oral every 6 hours PRN  polyethylene glycol 3350 17 Gram(s) Oral daily  predniSONE   Tablet 60 milliGRAM(s) Oral daily  pyrazinamide 1500 milliGRAM(s) Oral <User Schedule>  pyridoxine 50 milliGRAM(s) Oral daily  rifAMPin 600 milliGRAM(s) Oral daily  senna 2 Tablet(s) Oral at bedtime  sodium bicarbonate 325 milliGRAM(s) Oral two times a day      Physical Exam :    General exam :  saturating well on room air.   subcutaneous emphysema on check, neck , chest , arm - blow hole on right chest with wound vac on   pigtail chest tube on right   moving good air on left lung, reduce on right  bs present,   no edema, no tenderness on bilateral lower ext.  neuro -non focal.     CBC Full  -  ( 13 Jan 2024 05:30 )  WBC Count : 13.20 K/uL  RBC Count : 2.74 M/uL  Hemoglobin : 8.3 g/dL  Hematocrit : 25.3 %  Platelet Count - Automated : 349 K/uL  Mean Cell Volume : 92.3 fl  Mean Cell Hemoglobin : 30.3 pg  Mean Cell Hemoglobin Concentration : 32.8 gm/dL  Auto Neutrophil # : x  Auto Lymphocyte # : x  Auto Monocyte # : x  Auto Eosinophil # : x  Auto Basophil # : x  Auto Neutrophil % : x  Auto Lymphocyte % : x  Auto Monocyte % : x  Auto Eosinophil % : x  Auto Basophil % : x        01-13    139  |  111<H>  |  48<H>  ----------------------------<  109<H>  4.3   |  18<L>  |  2.23<H>    Ca    10.3      13 Jan 2024 05:30  Phos  4.1     01-12  Mg     2.0     01-13      Daily     Daily   CAPILLARY BLOOD GLUCOSE          Urinalysis Basic - ( 13 Jan 2024 05:30 )    Color: x / Appearance: x / SG: x / pH: x  Gluc: 109 mg/dL / Ketone: x  / Bili: x / Urobili: x   Blood: x / Protein: x / Nitrite: x   Leuk Esterase: x / RBC: x / WBC x   Sq Epi: x / Non Sq Epi: x / Bacteria: x        PT/INR - ( 12 Jan 2024 07:29 )   PT: 12.5 sec;   INR: 1.10          PTT - ( 12 Jan 2024 07:29 )  PTT:22.6 sec

## 2024-01-13 NOTE — PROGRESS NOTE ADULT - ASSESSMENT
73 year old M, Cantonese speaking, never a smoker, with PMHx of HTN, HLD, who is referred by Dr. Edward Jensen for an initial evaluation of pleural effusion. Patient reports right lower quadrant pain since Aug 2023, along with poor appetite, unintentionally weight loss around 20lbs, fatigue, progressively worse hoarseness and dyspnea. Pt underwent R VATS, pleural biopsy decortication, on 1/5 with Dr. Kaiser. Intraop path revealed necrotizing granuloma. ID consulted for TB workup and management. He arrived to [facility] post op with 2 CTs in place, on suction, with air leaks. POD2 AFB samples sent and obtained and he was started on antibiotic regimen per ID. POD3 AFB came back negative but on POD4 OR cultures came back positive for TB. One CT was removed with stable CXR. POD5 second CT removed. POD6 new left pneumothorax and increase in subcutaneous emphysema noted, left chest blowhole and pigtail were placed. Reported vision floaters so ethambutol discontinued and transitioned to Levofloxcin. POD 7, renal concerned that rifampin could be contributing to NARA, but per ID, rifampin is a crucial medication in the management of his TB, and give that Cr was elevated before pt was started on rifampin, suspicion is low for rifampin causing AIN from ID perspective. Will keep pt on rifampin for the time being and continue to monitor. On POD 7, there was increase in subQ emphysema down to scrotum. POD 8, pending CT chest, no SubQ emphysema in scrotum appreciated Maintaining pigtail to suction and wound vac to blow hole.     Neuro:   Complaining of pain, has PRN APAP and oxycodone in place, changing frequency to q4 hours   No delirium, no focal deficits     Cardiac:   Vital signs stable over last 24 hours   - HR: 100-114  - BP: 118-159/56-79   HTN:   - on labetolol 100 TID, hydralazine 25 mg TID   - holding home Norvasc 5mg, lorsartan 100 mg per interactions with rifampin   HLD:  - continue lipitor 10 mg    Monitor on Telemetry     Pulm:   POD 8 s/p R VATS, Pleural Biopsy, and Decortication c/b post-pull PTX and significant subq emphysema   - pigtail to suction without leak   - blow hole to decrease subq emphysema, -- wound vac in place   - monitor subq emphysema    Saturating well on room air   Encouraging IS   CXR: significant subq emphysema   TB   - on rifampin, isoniazid, pyrazinamide and levofloxacin (changed from ethambutol 2/2 "floaters")   - prophylactic pyridoxine in place   - still on isolation   - pending CT chest to assess for cavitary lesions     GI:   Tolerating diet well   GI PPx: Pepcid   Continue with bowel regimen     Renal:   Elevated Cr   - BUN/Cr with slight increase to 48/2.23   - nephrology following -- sending repeat urine lytes per team   - adding gentle hydration of LR @ 50 cc/hr per Dr. Kaiser   - on prednisone and sodium bicarbonate   Monitor I/Os   - voiding well     Heme:   H&H stable @ 8.3/25.3   DVT prophylaxis: SCDs and SQH 5000U     ID:   Afebrile, WBC stable @ 13.2  Continue to monitor for fever, leukocytosis, or SIRS/sepsis   TB   - continue current ABx regimen as above   - AARON to be contacted in regards to home iso status   - AFB negative, intra-op cultures positive -- more likley latent TB/     MSK:   Encourage ambulation   PT/OT     Endocrine:   No Hx of DM or Thyroid Disease  - A1C: 5.2  - TSH: 0.928   Monitor blood glucose levels     Wound:  Monitor for SSI   Maintain wound vac to blow hole     Dispo:   Inpatient management

## 2024-01-13 NOTE — PROGRESS NOTE ADULT - ASSESSMENT
73 year old M, Cantonese speaking, never a smoker, with PMHx of HTN, HLD, CKD3,BPH,  Gout, hx Shingles right arm ( 10/2023), hx RLQ pain since ( 8/2023) along with poor appetite, unintentional weight loss around 20 lbs, and progressively worse hoarseness and dyspnea. recent EGD and colonoscopy unremarkable. Pt was referred by Heme Onc Dr. Edward Jensen for an initial evaluation of right pleural effusion. Pt underwent 	R VATS RA Pleural biopsy and decortication (Frozen + Necrotizing granuloma) 1/5/24- pleural AFB positive, sputum AFB smear negative x 3 ( culture pending ) on RIBE + B 6 - ethambutol dc due to floaters, started on levaquin, - post CT removal, right -pneumothorax-  pig tail place 1/11-, significant subq emphysemia - blow hole with wound vac,  pod # 8    - 9LA for HD monitoring   - pig tail , blow hole with wound vac- care by CTS appreciated.  saturating well on room air.   - airborne isolation to rule out TB  - ID f/u appreciated, Team 1 Dr. Alcala discussed.  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  "1.  Continue Rifampin 600 mg PO daily  2.  Continue  mg PO daily  3.  Stop Ethambutol 1200 mg PO three times a week (M, W, F). Patient is having vision changes and seeing floaters now, which is known side effect of Ethambutol.  4.  Start Levaquin 750mg PO q 48 hours  5.  Continue pyrazinamide 1500 mg PO three times a week (M, W, F)  6.  Continue vitamin B6 50 mg PO daily"    - ID contacted  [facility]-  AARON called and discussed with wife 1/10/24- ( not contagious to others) to follow up with AARON    - Renal f/u appreciated, for NARA on CKD3 (baseline Cr 1.8) : S Cr baseline 1.91( 12/15/23)-> 2.71 ( 1/5)-> 2.52( 1/6)-> 2.13( 1/7)-> 2.23( 1/8)-> 2.05(1/9) , UA noted w. mild proteinuria  - ANCA negative  - renal concern AIN started on prednisone, continue to monitor renal function.   - to ensure adequate hydration.     - Hypercalcemia: could be related to MTB. - avoid calcium supplement and vitamin D , adequate hydration.  -  trend BMP for S Cr and avoid nephrotoxic meds/NSAIDS/iv contrast dye     - pain control   - acetaminophen     Tablet .. 975 milliGRAM(s) Oral every 6 hours PRN  - oxyCODONE    IR 5 milliGRAM(s) Oral every 6 hours PRN  - bowel regimen:   - bisacodyl 5 milliGRAM(s) Oral at bedtime  - polyethylene glycol 3350 17 Gram(s) Oral daily- held for diarrhoea.  - senna 2 Tablet(s) Oral at bedtime-held for diarrhoea.    - HTN: and tachycardia.   - could be contributing from pain, pt and family educated, to ask for pain meds.   - currently on Hydralazine 25 mg q 8 hourly  - labetalol 100 tid     - HLD: atorvastatin 10 milliGRAM(s) Oral at bedtime  - GI ppx: pantoprazole    Tablet 40 milliGRAM(s) Oral before breakfast  - DVT ppx: heparin   Injectable 5000 Unit(s) SubCutaneous every 8 hours    Contacts:   Referring Heme Onc Dr Joanne Jensen     POC as d/w family and CTS PA, wife,              73 year old M, Cantonese speaking, never a smoker, with PMHx of HTN, HLD, CKD3,BPH,  Gout, hx Shingles right arm ( 10/2023), hx RLQ pain since ( 8/2023) along with poor appetite, unintentional weight loss around 20 lbs, and progressively worse hoarseness and dyspnea. recent EGD and colonoscopy unremarkable. Pt was referred by Heme Onc Dr. Edward Jensen for an initial evaluation of right pleural effusion. Pt underwent 	R VATS RA Pleural biopsy and decortication (Frozen + Necrotizing granuloma) 1/5/24- pleural AFB positive, sputum AFB smear negative x 3 ( culture pending ) on RIBE + B 6 - ethambutol dc due to floaters, started on levaquin, - post CT removal, right -pneumothorax-  pig tail place 1/11-, significant subq emphysemia - blow hole with wound vac,  pod # 8    - 9LA for HD monitoring   - pig tail , blow hole with wound vac- care by CTS appreciated.  saturating well on room air.   - airborne isolation to rule out TB  - ID f/u appreciated, Team 1 Dr. Alcala discussed.  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  "1.  Continue Rifampin 600 mg PO daily  2.  Continue  mg PO daily  3.  Stop Ethambutol 1200 mg PO three times a week (M, W, F). Patient is having vision changes and seeing floaters now, which is known side effect of Ethambutol.  4.  Start Levaquin 750mg PO q 48 hours  5.  Continue pyrazinamide 1500 mg PO three times a week (M, W, F)  6.  Continue vitamin B6 50 mg PO daily"    - ID contacted  [facility]-  AARON called and discussed with wife 1/10/24- ( not contagious to others) to follow up with AARON    - Renal f/u appreciated, for NARA on CKD3 (baseline Cr 1.8) : S Cr baseline 1.91( 12/15/23)-> 2.71 ( 1/5)-> 2.52( 1/6)-> 2.13( 1/7)-> 2.23( 1/8)-> 2.05(1/9) , UA noted w. mild proteinuria  - ANCA negative  - renal concern AIN started on prednisone, continue to monitor renal function.   - to ensure adequate hydration.     - Hypercalcemia: could be related to MTB. - avoid calcium supplement and vitamin D , adequate hydration.  -  trend BMP for S Cr and avoid nephrotoxic meds/NSAIDS/iv contrast dye     - pain control   - acetaminophen     Tablet .. 975 milliGRAM(s) Oral every 6 hours PRN  - oxyCODONE    IR 5 milliGRAM(s) Oral every 6 hours PRN  - bowel regimen:   - bisacodyl 5 milliGRAM(s) Oral at bedtime  - polyethylene glycol 3350 17 Gram(s) Oral daily- held for diarrhoea.  - senna 2 Tablet(s) Oral at bedtime-held for diarrhoea.    - HTN: and tachycardia.   - could be contributing from pain, pt and family educated, to ask for pain meds.   - currently on Hydralazine 25 mg q 8 hourly  - labetalol 100 tid     - HLD: atorvastatin 10 milliGRAM(s) Oral at bedtime  - GI ppx: pantoprazole    Tablet 40 milliGRAM(s) Oral before breakfast  - DVT ppx: heparin   Injectable 5000 Unit(s) SubCutaneous every 8 hours    Contacts:   Referring Heme Onc Dr Joanne Jensen     POC as d/w family and CTS PA, wife,

## 2024-01-14 LAB
ALBUMIN SERPL ELPH-MCNC: 3 G/DL — LOW (ref 3.3–5)
ALBUMIN SERPL ELPH-MCNC: 3 G/DL — LOW (ref 3.3–5)
ALP SERPL-CCNC: 76 U/L — SIGNIFICANT CHANGE UP (ref 40–120)
ALP SERPL-CCNC: 76 U/L — SIGNIFICANT CHANGE UP (ref 40–120)
ALT FLD-CCNC: 47 U/L — HIGH (ref 10–45)
ALT FLD-CCNC: 47 U/L — HIGH (ref 10–45)
ANION GAP SERPL CALC-SCNC: 10 MMOL/L — SIGNIFICANT CHANGE UP (ref 5–17)
ANION GAP SERPL CALC-SCNC: 10 MMOL/L — SIGNIFICANT CHANGE UP (ref 5–17)
APTT BLD: 32 SEC — SIGNIFICANT CHANGE UP (ref 24.5–35.6)
APTT BLD: 32 SEC — SIGNIFICANT CHANGE UP (ref 24.5–35.6)
AST SERPL-CCNC: 38 U/L — SIGNIFICANT CHANGE UP (ref 10–40)
AST SERPL-CCNC: 38 U/L — SIGNIFICANT CHANGE UP (ref 10–40)
BILIRUB SERPL-MCNC: 0.3 MG/DL — SIGNIFICANT CHANGE UP (ref 0.2–1.2)
BILIRUB SERPL-MCNC: 0.3 MG/DL — SIGNIFICANT CHANGE UP (ref 0.2–1.2)
BUN SERPL-MCNC: 45 MG/DL — HIGH (ref 7–23)
BUN SERPL-MCNC: 45 MG/DL — HIGH (ref 7–23)
CALCIUM SERPL-MCNC: 10.4 MG/DL — SIGNIFICANT CHANGE UP (ref 8.4–10.5)
CALCIUM SERPL-MCNC: 10.4 MG/DL — SIGNIFICANT CHANGE UP (ref 8.4–10.5)
CHLORIDE SERPL-SCNC: 109 MMOL/L — HIGH (ref 96–108)
CHLORIDE SERPL-SCNC: 109 MMOL/L — HIGH (ref 96–108)
CO2 SERPL-SCNC: 18 MMOL/L — LOW (ref 22–31)
CO2 SERPL-SCNC: 18 MMOL/L — LOW (ref 22–31)
CREAT SERPL-MCNC: 1.95 MG/DL — HIGH (ref 0.5–1.3)
CREAT SERPL-MCNC: 1.95 MG/DL — HIGH (ref 0.5–1.3)
EGFR: 36 ML/MIN/1.73M2 — LOW
EGFR: 36 ML/MIN/1.73M2 — LOW
GLUCOSE SERPL-MCNC: 105 MG/DL — HIGH (ref 70–99)
GLUCOSE SERPL-MCNC: 105 MG/DL — HIGH (ref 70–99)
HCT VFR BLD CALC: 25.1 % — LOW (ref 39–50)
HCT VFR BLD CALC: 25.1 % — LOW (ref 39–50)
HGB BLD-MCNC: 8.2 G/DL — LOW (ref 13–17)
HGB BLD-MCNC: 8.2 G/DL — LOW (ref 13–17)
INR BLD: 1.06 — SIGNIFICANT CHANGE UP (ref 0.85–1.18)
INR BLD: 1.06 — SIGNIFICANT CHANGE UP (ref 0.85–1.18)
MAGNESIUM SERPL-MCNC: 1.8 MG/DL — SIGNIFICANT CHANGE UP (ref 1.6–2.6)
MAGNESIUM SERPL-MCNC: 1.8 MG/DL — SIGNIFICANT CHANGE UP (ref 1.6–2.6)
MCHC RBC-ENTMCNC: 30.1 PG — SIGNIFICANT CHANGE UP (ref 27–34)
MCHC RBC-ENTMCNC: 30.1 PG — SIGNIFICANT CHANGE UP (ref 27–34)
MCHC RBC-ENTMCNC: 32.7 GM/DL — SIGNIFICANT CHANGE UP (ref 32–36)
MCHC RBC-ENTMCNC: 32.7 GM/DL — SIGNIFICANT CHANGE UP (ref 32–36)
MCV RBC AUTO: 92.3 FL — SIGNIFICANT CHANGE UP (ref 80–100)
MCV RBC AUTO: 92.3 FL — SIGNIFICANT CHANGE UP (ref 80–100)
NRBC # BLD: 0 /100 WBCS — SIGNIFICANT CHANGE UP (ref 0–0)
NRBC # BLD: 0 /100 WBCS — SIGNIFICANT CHANGE UP (ref 0–0)
PLATELET # BLD AUTO: 346 K/UL — SIGNIFICANT CHANGE UP (ref 150–400)
PLATELET # BLD AUTO: 346 K/UL — SIGNIFICANT CHANGE UP (ref 150–400)
POTASSIUM SERPL-MCNC: 4.1 MMOL/L — SIGNIFICANT CHANGE UP (ref 3.5–5.3)
POTASSIUM SERPL-MCNC: 4.1 MMOL/L — SIGNIFICANT CHANGE UP (ref 3.5–5.3)
POTASSIUM SERPL-SCNC: 4.1 MMOL/L — SIGNIFICANT CHANGE UP (ref 3.5–5.3)
POTASSIUM SERPL-SCNC: 4.1 MMOL/L — SIGNIFICANT CHANGE UP (ref 3.5–5.3)
PROT SERPL-MCNC: 6.2 G/DL — SIGNIFICANT CHANGE UP (ref 6–8.3)
PROT SERPL-MCNC: 6.2 G/DL — SIGNIFICANT CHANGE UP (ref 6–8.3)
PROTHROM AB SERPL-ACNC: 12.1 SEC — SIGNIFICANT CHANGE UP (ref 9.5–13)
PROTHROM AB SERPL-ACNC: 12.1 SEC — SIGNIFICANT CHANGE UP (ref 9.5–13)
RBC # BLD: 2.72 M/UL — LOW (ref 4.2–5.8)
RBC # BLD: 2.72 M/UL — LOW (ref 4.2–5.8)
RBC # FLD: 18.3 % — HIGH (ref 10.3–14.5)
RBC # FLD: 18.3 % — HIGH (ref 10.3–14.5)
SODIUM SERPL-SCNC: 137 MMOL/L — SIGNIFICANT CHANGE UP (ref 135–145)
SODIUM SERPL-SCNC: 137 MMOL/L — SIGNIFICANT CHANGE UP (ref 135–145)
WBC # BLD: 11.53 K/UL — HIGH (ref 3.8–10.5)
WBC # BLD: 11.53 K/UL — HIGH (ref 3.8–10.5)
WBC # FLD AUTO: 11.53 K/UL — HIGH (ref 3.8–10.5)
WBC # FLD AUTO: 11.53 K/UL — HIGH (ref 3.8–10.5)

## 2024-01-14 PROCEDURE — 99232 SBSQ HOSP IP/OBS MODERATE 35: CPT

## 2024-01-14 PROCEDURE — 71045 X-RAY EXAM CHEST 1 VIEW: CPT | Mod: 26

## 2024-01-14 RX ORDER — MAGNESIUM OXIDE 400 MG ORAL TABLET 241.3 MG
800 TABLET ORAL ONCE
Refills: 0 | Status: COMPLETED | OUTPATIENT
Start: 2024-01-14 | End: 2024-01-14

## 2024-01-14 RX ORDER — LABETALOL HCL 100 MG
200 TABLET ORAL EVERY 8 HOURS
Refills: 0 | Status: DISCONTINUED | OUTPATIENT
Start: 2024-01-14 | End: 2024-01-17

## 2024-01-14 RX ADMIN — MAGNESIUM OXIDE 400 MG ORAL TABLET 800 MILLIGRAM(S): 241.3 TABLET ORAL at 07:56

## 2024-01-14 RX ADMIN — Medication 25 MILLIGRAM(S): at 00:08

## 2024-01-14 RX ADMIN — Medication 5 MILLIGRAM(S): at 21:13

## 2024-01-14 RX ADMIN — HEPARIN SODIUM 5000 UNIT(S): 5000 INJECTION INTRAVENOUS; SUBCUTANEOUS at 07:03

## 2024-01-14 RX ADMIN — Medication 50 MILLIGRAM(S): at 12:42

## 2024-01-14 RX ADMIN — Medication 60 MILLIGRAM(S): at 07:02

## 2024-01-14 RX ADMIN — Medication 975 MILLIGRAM(S): at 12:44

## 2024-01-14 RX ADMIN — Medication 325 MILLIGRAM(S): at 18:51

## 2024-01-14 RX ADMIN — Medication 25 MILLIGRAM(S): at 07:03

## 2024-01-14 RX ADMIN — Medication 200 MILLIGRAM(S): at 15:01

## 2024-01-14 RX ADMIN — HEPARIN SODIUM 5000 UNIT(S): 5000 INJECTION INTRAVENOUS; SUBCUTANEOUS at 21:13

## 2024-01-14 RX ADMIN — Medication 325 MILLIGRAM(S): at 07:02

## 2024-01-14 RX ADMIN — FAMOTIDINE 10 MILLIGRAM(S): 10 INJECTION INTRAVENOUS at 12:44

## 2024-01-14 RX ADMIN — HEPARIN SODIUM 5000 UNIT(S): 5000 INJECTION INTRAVENOUS; SUBCUTANEOUS at 14:01

## 2024-01-14 RX ADMIN — Medication 200 MILLIGRAM(S): at 09:46

## 2024-01-14 RX ADMIN — Medication 200 MILLIGRAM(S): at 21:13

## 2024-01-14 RX ADMIN — OXYCODONE HYDROCHLORIDE 5 MILLIGRAM(S): 5 TABLET ORAL at 22:03

## 2024-01-14 RX ADMIN — Medication 5 MILLIGRAM(S): at 15:01

## 2024-01-14 RX ADMIN — OXYCODONE HYDROCHLORIDE 5 MILLIGRAM(S): 5 TABLET ORAL at 21:14

## 2024-01-14 RX ADMIN — ATORVASTATIN CALCIUM 10 MILLIGRAM(S): 80 TABLET, FILM COATED ORAL at 21:13

## 2024-01-14 RX ADMIN — SENNA PLUS 2 TABLET(S): 8.6 TABLET ORAL at 21:13

## 2024-01-14 RX ADMIN — Medication 975 MILLIGRAM(S): at 12:50

## 2024-01-14 RX ADMIN — POLYETHYLENE GLYCOL 3350 17 GRAM(S): 17 POWDER, FOR SOLUTION ORAL at 13:00

## 2024-01-14 RX ADMIN — Medication 300 MILLIGRAM(S): at 12:42

## 2024-01-14 NOTE — PROGRESS NOTE ADULT - ASSESSMENT
73 year old M, Cantonese speaking, never a smoker, with PMHx of HTN, HLD, CKD3,BPH,  Gout, hx Shingles right arm ( 10/2023), hx RLQ pain since ( 8/2023) along with poor appetite, unintentional weight loss around 20 lbs, and progressively worse hoarseness and dyspnea. recent EGD and colonoscopy unremarkable. Pt was referred by Heme Onc Dr. Edward Jensen for an initial evaluation of right pleural effusion. Pt underwent 	R VATS RA Pleural biopsy and decortication (Frozen + Necrotizing granuloma) 1/5/24- pleural AFB positive, sputum AFB smear negative x 3 ( culture pending ) on RIBE + B 6 - ethambutol dc due to floaters, started on levaquin, - post CT removal, right -pneumothorax-  pig tail place 1/11-, significant subq emphysemia - blow hole with wound vac,  pod # 9    - 9LA for HD monitoring   - pig tail , blow hole with wound vac- care by CTS appreciated.  saturating well on room air.   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  "1.  Continue Rifampin 600 mg PO daily  2.  Continue  mg PO daily  3.  Stop Ethambutol 1200 mg PO three times a week (M, W, F). Patient is having vision changes and seeing floaters now, which is known side effect of Ethambutol.  4.  Start Levaquin 750mg PO q 48 hours  5.  Continue pyrazinamide 1500 mg PO three times a week (M, W, F)  6.  Continue vitamin B6 50 mg PO daily"    - ID contacted  [facility]-  AARON called and discussed with wife 1/10/24- ( not contagious to others) to follow up with AARON    - Renal f/u appreciated, for NARA on CKD3 (baseline Cr 1.8) : S Cr baseline 1.91( 12/15/23)-> 2.71 ( 1/5)-> 2.52( 1/6)-> 2.13( 1/7)-> 2.23( 1/8)-> 2.05(1/9) , UA noted w. mild proteinuria--> 1.9 on 1/14  - ANCA negative  - renal concern AIN started on prednisone 60 mg # 4. continue to monitor renal function.   - to ensure adequate hydration  - dw renal Dr. Cunningham    - Hypercalcemia: could be related to MTB. - avoid calcium supplement and vitamin D , adequate hydration.  -  trend BMP for S Cr and avoid nephrotoxic meds/NSAIDS/iv contrast dye     - pain control   - acetaminophen     Tablet .. 975 milliGRAM(s) Oral every 6 hours PRN  - oxyCODONE    IR 5 milliGRAM(s) Oral every 6 hours PRN  - bowel regimen:   - bisacodyl 5 milliGRAM(s) Oral at bedtime  - polyethylene glycol 3350 17 Gram(s) Oral daily- to restart  - senna 2 Tablet(s) Oral at bedtime-to restart.    - HTN: and tachycardia.   - could be contributing from pain, pt and family educated, to ask for pain meds.   - currently on Hydralazine 25 mg q 8 hourly  - labetalol 100 tid     - HLD: atorvastatin 10 milliGRAM(s) Oral at bedtime  - GI ppx: pantoprazole    Tablet 40 milliGRAM(s) Oral before breakfast  - DVT ppx: heparin   Injectable 5000 Unit(s) SubCutaneous every 8 hours    Contacts:   Referring Heme Onc Dr Joanne Jensen     POC as d/w family and CTS PA, wife,              73 year old M, Cantonese speaking, never a smoker, with PMHx of HTN, HLD, CKD3,BPH,  Gout, hx Shingles right arm ( 10/2023), hx RLQ pain since ( 8/2023) along with poor appetite, unintentional weight loss around 20 lbs, and progressively worse hoarseness and dyspnea. recent EGD and colonoscopy unremarkable. Pt was referred by Heme Onc Dr. Edward Jensen for an initial evaluation of right pleural effusion. Pt underwent 	R VATS RA Pleural biopsy and decortication (Frozen + Necrotizing granuloma) 1/5/24- pleural AFB positive, sputum AFB smear negative x 3 ( culture pending ) on RIBE + B 6 - ethambutol dc due to floaters, started on levaquin, - post CT removal, right -pneumothorax-  pig tail place 1/11-, significant subq emphysemia - blow hole with wound vac,  pod # 9    - 9LA for HD monitoring   - pig tail , blow hole with wound vac- care by CTS appreciated.  saturating well on room air.   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  "1.  Continue Rifampin 600 mg PO daily  2.  Continue  mg PO daily  3.  Stop Ethambutol 1200 mg PO three times a week (M, W, F). Patient is having vision changes and seeing floaters now, which is known side effect of Ethambutol.  4.  Start Levaquin 750mg PO q 48 hours  5.  Continue pyrazinamide 1500 mg PO three times a week (M, W, F)  6.  Continue vitamin B6 50 mg PO daily"    - ID contacted  [facility]-  AARON called and discussed with wife 1/10/24- ( not contagious to others) to follow up with AARON    - Renal f/u appreciated, for NARA on CKD3 (baseline Cr 1.8) : S Cr baseline 1.91( 12/15/23)-> 2.71 ( 1/5)-> 2.52( 1/6)-> 2.13( 1/7)-> 2.23( 1/8)-> 2.05(1/9) , UA noted w. mild proteinuria--> 1.9 on 1/14  - ANCA negative  - renal concern AIN started on prednisone 60 mg # 4. continue to monitor renal function.   - to ensure adequate hydration  - dw renal Dr. Cunningham    - Hypercalcemia: could be related to MTB. - avoid calcium supplement and vitamin D , adequate hydration.  -  trend BMP for S Cr and avoid nephrotoxic meds/NSAIDS/iv contrast dye     - pain control   - acetaminophen     Tablet .. 975 milliGRAM(s) Oral every 6 hours PRN  - oxyCODONE    IR 5 milliGRAM(s) Oral every 6 hours PRN  - bowel regimen:   - bisacodyl 5 milliGRAM(s) Oral at bedtime  - polyethylene glycol 3350 17 Gram(s) Oral daily- to restart  - senna 2 Tablet(s) Oral at bedtime-to restart.    - HTN: and tachycardia.   - could be contributing from pain, pt and family educated, to ask for pain meds.   - currently on Hydralazine 25 mg q 8 hourly  - labetalol 100 tid     - HLD: atorvastatin 10 milliGRAM(s) Oral at bedtime  - GI ppx: pantoprazole    Tablet 40 milliGRAM(s) Oral before breakfast  - DVT ppx: heparin   Injectable 5000 Unit(s) SubCutaneous every 8 hours    Contacts:   Referring Heme Onc Dr Joanne Jensen     POC as d/w family and CTS PA, wife,

## 2024-01-14 NOTE — PROGRESS NOTE ADULT - SUBJECTIVE AND OBJECTIVE BOX
GUILLAUME MANNING , [id number],  [facility] [address]LA [id number]    Time of encounter : 10 am   wife at bedside  still with hoarseness of voice, decrease swelling/sq emphysema on cheeks - felt better today  pig tail and blow-hole still in place.  saturating well on room air.  voiding well.       T(C): 36.4 (01-14-24 @ 14:14), Max: 36.8 (01-13-24 @ 22:03)  HR: 118 (01-14-24 @ 09:38) (104 - 118)  BP: 132/68 (01-14-24 @ 09:38) (106/74 - 152/78)  RR: 15 (01-14-24 @ 09:38) (15 - 17)  SpO2: 98% (01-14-24 @ 09:38) (96% - 98%)    acetaminophen     Tablet .. 975 milliGRAM(s) Oral every 6 hours PRN  atorvastatin 10 milliGRAM(s) Oral at bedtime  bisacodyl 5 milliGRAM(s) Oral at bedtime  famotidine    Tablet 10 milliGRAM(s) Oral daily  heparin   Injectable 5000 Unit(s) SubCutaneous every 8 hours  hydrALAZINE 25 milliGRAM(s) Oral every 8 hours  isoniazid 300 milliGRAM(s) Oral daily  labetalol 200 milliGRAM(s) Oral every 8 hours  lactated ringers. 1000 milliLiter(s) IV Continuous <Continuous>  levoFLOXacin  Tablet 750 milliGRAM(s) Oral every 48 hours  oxyCODONE    IR 5 milliGRAM(s) Oral every 4 hours PRN  polyethylene glycol 3350 17 Gram(s) Oral daily  predniSONE   Tablet 60 milliGRAM(s) Oral daily  pyrazinamide 1500 milliGRAM(s) Oral <User Schedule>  pyridoxine 50 milliGRAM(s) Oral daily  rifAMPin 600 milliGRAM(s) Oral daily  senna 2 Tablet(s) Oral at bedtime  sodium bicarbonate 325 milliGRAM(s) Oral two times a day      Physical Exam :    General exam :  decrease sq emphysema on bilateral cheeks, still present around the neck, arm to forearm, upper body, lower body spared.  RRR  moving good air on left, hard to appreciate on right.  pig tail on right chest, blow hole on right chest with mild pain around the area  no edema.  neuro - non focal.     CBC Full  -  ( 14 Jan 2024 05:30 )  WBC Count : 11.53 K/uL  RBC Count : 2.72 M/uL  Hemoglobin : 8.2 g/dL  Hematocrit : 25.1 %  Platelet Count - Automated : 346 K/uL  Mean Cell Volume : 92.3 fl  Mean Cell Hemoglobin : 30.1 pg  Mean Cell Hemoglobin Concentration : 32.7 gm/dL  Auto Neutrophil # : x  Auto Lymphocyte # : x  Auto Monocyte # : x  Auto Eosinophil # : x  Auto Basophil # : x  Auto Neutrophil % : x  Auto Lymphocyte % : x  Auto Monocyte % : x  Auto Eosinophil % : x  Auto Basophil % : x        01-14    137  |  109<H>  |  45<H>  ----------------------------<  105<H>  4.1   |  18<L>  |  1.95<H>    Ca    10.4      14 Jan 2024 05:30  Mg     1.8     01-14    TPro  6.2  /  Alb  3.0<L>  /  TBili  0.3  /  DBili  x   /  AST  38  /  ALT  47<H>  /  AlkPhos  76  01-14    Daily     Daily   CAPILLARY BLOOD GLUCOSE          Urinalysis Basic - ( 14 Jan 2024 05:30 )    Color: x / Appearance: x / SG: x / pH: x  Gluc: 105 mg/dL / Ketone: x  / Bili: x / Urobili: x   Blood: x / Protein: x / Nitrite: x   Leuk Esterase: x / RBC: x / WBC x   Sq Epi: x / Non Sq Epi: x / Bacteria: x        PT/INR - ( 14 Jan 2024 05:30 )   PT: 12.1 sec;   INR: 1.06          PTT - ( 14 Jan 2024 05:30 )  PTT:32.0 sec           GUILLAUME MANNING , [id number],  [facility] [id number]    Time of encounter : 10 am   wife at bedside  still with hoarseness of voice, decrease swelling/sq emphysema on cheeks - felt better today  pig tail and blow-hole still in place.  saturating well on room air.  voiding well.       T(C): 36.4 (01-14-24 @ 14:14), Max: 36.8 (01-13-24 @ 22:03)  HR: 118 (01-14-24 @ 09:38) (104 - 118)  BP: 132/68 (01-14-24 @ 09:38) (106/74 - 152/78)  RR: 15 (01-14-24 @ 09:38) (15 - 17)  SpO2: 98% (01-14-24 @ 09:38) (96% - 98%)    acetaminophen     Tablet .. 975 milliGRAM(s) Oral every 6 hours PRN  atorvastatin 10 milliGRAM(s) Oral at bedtime  bisacodyl 5 milliGRAM(s) Oral at bedtime  famotidine    Tablet 10 milliGRAM(s) Oral daily  heparin   Injectable 5000 Unit(s) SubCutaneous every 8 hours  hydrALAZINE 25 milliGRAM(s) Oral every 8 hours  isoniazid 300 milliGRAM(s) Oral daily  labetalol 200 milliGRAM(s) Oral every 8 hours  lactated ringers. 1000 milliLiter(s) IV Continuous <Continuous>  levoFLOXacin  Tablet 750 milliGRAM(s) Oral every 48 hours  oxyCODONE    IR 5 milliGRAM(s) Oral every 4 hours PRN  polyethylene glycol 3350 17 Gram(s) Oral daily  predniSONE   Tablet 60 milliGRAM(s) Oral daily  pyrazinamide 1500 milliGRAM(s) Oral <User Schedule>  pyridoxine 50 milliGRAM(s) Oral daily  rifAMPin 600 milliGRAM(s) Oral daily  senna 2 Tablet(s) Oral at bedtime  sodium bicarbonate 325 milliGRAM(s) Oral two times a day      Physical Exam :    General exam :  decrease sq emphysema on bilateral cheeks, still present around the neck, arm to forearm, upper body, lower body spared.  RRR  moving good air on left, hard to appreciate on right.  pig tail on right chest, blow hole on right chest with mild pain around the area  no edema.  neuro - non focal.     CBC Full  -  ( 14 Jan 2024 05:30 )  WBC Count : 11.53 K/uL  RBC Count : 2.72 M/uL  Hemoglobin : 8.2 g/dL  Hematocrit : 25.1 %  Platelet Count - Automated : 346 K/uL  Mean Cell Volume : 92.3 fl  Mean Cell Hemoglobin : 30.1 pg  Mean Cell Hemoglobin Concentration : 32.7 gm/dL  Auto Neutrophil # : x  Auto Lymphocyte # : x  Auto Monocyte # : x  Auto Eosinophil # : x  Auto Basophil # : x  Auto Neutrophil % : x  Auto Lymphocyte % : x  Auto Monocyte % : x  Auto Eosinophil % : x  Auto Basophil % : x        01-14    137  |  109<H>  |  45<H>  ----------------------------<  105<H>  4.1   |  18<L>  |  1.95<H>    Ca    10.4      14 Jan 2024 05:30  Mg     1.8     01-14    TPro  6.2  /  Alb  3.0<L>  /  TBili  0.3  /  DBili  x   /  AST  38  /  ALT  47<H>  /  AlkPhos  76  01-14    Daily     Daily   CAPILLARY BLOOD GLUCOSE          Urinalysis Basic - ( 14 Jan 2024 05:30 )    Color: x / Appearance: x / SG: x / pH: x  Gluc: 105 mg/dL / Ketone: x  / Bili: x / Urobili: x   Blood: x / Protein: x / Nitrite: x   Leuk Esterase: x / RBC: x / WBC x   Sq Epi: x / Non Sq Epi: x / Bacteria: x        PT/INR - ( 14 Jan 2024 05:30 )   PT: 12.1 sec;   INR: 1.06          PTT - ( 14 Jan 2024 05:30 )  PTT:32.0 sec

## 2024-01-14 NOTE — PROGRESS NOTE ADULT - SUBJECTIVE AND OBJECTIVE BOX
NEPHROLOGY PROGRESS NOTE    Subjective: Patient seen and examined at bedside. Discussed with wife and patient at bedside with hospitalist who assists with translation, doing well, no issues, sCr thankfully improving, will monitor, encouraged PO intake    [OBJECTIVE]:    Vital Signs:  T(F): , Max: 98.2 (01-13-24 @ 22:03)  HR:  (104 - 118)  BP:  (106/74 - 152/78)  BP(mean):  (84 - 106)  ABP: --  ABP(mean): --  RR:  (15 - 17)  SpO2:  (96% - 98%)  CVP(mm Hg): --  CVP(cm H2O): --      01-13 @ 07:01  -  01-14 @ 07:00  --------------------------------------------------------  IN: 1480 mL / OUT: 615 mL / NET: 865 mL    01-14 @ 07:01  -  01-14 @ 13:14  --------------------------------------------------------  IN: 50 mL / OUT: 0 mL / NET: 50 mL      CAPILLARY BLOOD GLUCOSE          .  VITAL SIGNS:  T(F): 96.8 (01-14-24 @ 09:16), Max: 98.2 (01-13-24 @ 22:03)  HR: 118 (01-14-24 @ 09:38) (104 - 118)  BP: 132/68 (01-14-24 @ 09:38) (106/74 - 152/78)  BP(mean): 93 (01-14-24 @ 09:38) (84 - 106)  RR: 15 (01-14-24 @ 09:38) (15 - 17)  SpO2: 98% (01-14-24 @ 09:38) (96% - 98%)    PHYSICAL EXAM:  Constitutional: Resting comfortably in bed; NAD  HEENT:  EOMI, anicteric sclera; MMM  Respiratory: CTA B/L; no W/R/R, no accessory muscle use  Cardiac: +S1/S2; RRR; no M/R/G  Gastrointestinal: soft, NT/ND; no rebound or guarding; +BS  Extremities: WWP, no clubbing or cyanosis; no peripheral edema  Dermatologic: skin warm, dry and intact; no rashes, wounds, or scars  Neuro: moving limbs, no focal deficits      Medications:  MEDICATIONS  (STANDING):  atorvastatin 10 milliGRAM(s) Oral at bedtime  bisacodyl 5 milliGRAM(s) Oral at bedtime  famotidine    Tablet 10 milliGRAM(s) Oral daily  heparin   Injectable 5000 Unit(s) SubCutaneous every 8 hours  hydrALAZINE 25 milliGRAM(s) Oral every 8 hours  isoniazid 300 milliGRAM(s) Oral daily  labetalol 200 milliGRAM(s) Oral every 8 hours  lactated ringers. 1000 milliLiter(s) (50 mL/Hr) IV Continuous <Continuous>  levoFLOXacin  Tablet 750 milliGRAM(s) Oral every 48 hours  polyethylene glycol 3350 17 Gram(s) Oral daily  predniSONE   Tablet 60 milliGRAM(s) Oral daily  pyrazinamide 1500 milliGRAM(s) Oral <User Schedule>  pyridoxine 50 milliGRAM(s) Oral daily  rifAMPin 600 milliGRAM(s) Oral daily  senna 2 Tablet(s) Oral at bedtime  sodium bicarbonate 325 milliGRAM(s) Oral two times a day    MEDICATIONS  (PRN):  acetaminophen     Tablet .. 975 milliGRAM(s) Oral every 6 hours PRN Mild Pain (1 - 3)  oxyCODONE    IR 5 milliGRAM(s) Oral every 4 hours PRN Moderate Pain (4 - 6)      Allergies:  Allergies    No Known Allergies    Intolerances        Labs:                        8.2    11.53 )-----------( 346      ( 14 Jan 2024 05:30 )             25.1     01-14    137  |  109<H>  |  45<H>  ----------------------------<  105<H>  4.1   |  18<L>  |  1.95<H>    Ca    10.4      14 Jan 2024 05:30  Mg     1.8     01-14    TPro  6.2  /  Alb  3.0<L>  /  TBili  0.3  /  DBili  x   /  AST  38  /  ALT  47<H>  /  AlkPhos  76  01-14    PT/INR - ( 14 Jan 2024 05:30 )   PT: 12.1 sec;   INR: 1.06          PTT - ( 14 Jan 2024 05:30 )  PTT:32.0 sec  Urinalysis Basic - ( 14 Jan 2024 05:30 )    Color: x / Appearance: x / SG: x / pH: x  Gluc: 105 mg/dL / Ketone: x  / Bili: x / Urobili: x   Blood: x / Protein: x / Nitrite: x   Leuk Esterase: x / RBC: x / WBC x   Sq Epi: x / Non Sq Epi: x / Bacteria: x        Radiology and other tests:

## 2024-01-14 NOTE — PROGRESS NOTE ADULT - ASSESSMENT
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 Hospital course c/b NARA non oliguric initially thought to be prerenal/ATN with partial response to IVF down to 2.1 ranges. However, gradual uptrend noted starting on 1/8  (2.23-2.0-2.1) w/ highest SCr noted on 1/11 at 2.43 in addition to peripheral Eosinophilia 13.3% on 1/10 suggesting additional renal insult such as AIN drug induced     1. Non oliguric NARA sec to prerenal/ATN vs possible AIN - initial renal insult attributed to ATN/pre renal etiology which responded to IVF trial w/ SCr down to 2.1 ranges  However gradual uptrend noted on renal function up to 2.43 ranges in the setting of normal renal US, stable hemodynamics, bland UA and peripheral Eosinophilia suggesting acute interstitial nephritis (drug induced) possibly related to rifampin use (started on 1/6)  Empirically started on prednisone 60mg because of these findings on 1/11 w/ most recent SCr at 1.95  Discussed w/ ID and since Rifampin is a crucial anti-tb medication, stopping this medication will lead to poor clinical outcomes, it was decided to c/w this agent in addition to PO steroids.   C/w prednisone 60mg PO daily (day #4)   C/w strict I/O for monitoring  c/w PUD ppx w/ H2 antagonist   Adjust and dose all medications to current GFR  Work up for other potential disease mimics unremarkable   Trend BMP for additional renal function monitoring   Repeat UA with lytes yesterday noted with improvement, hopefully acute process improving    2. Hx of HTN -  c/w hydralazine 25mg TID and Labetalol 100mg PO BID    3. Hypercalcemia w/ suppressed PTH - in the setting of on going granulomatous disease (rest of hyperCa+ work up unremarkable)  Maintain adequate volume status  Maintain low calcium intake (no more than 400 mg/day) and low oxalate diet   Avoid dietary vitamin D supplements  Expect gradual correction of Ca+ levels as treatment of the underlying disease continues    4. CKD Stage III - not yet at baseline  c/w Bicarb 130mg PO TID   C/w to avoid NSAIDs and other nephrotoxins     Discussed in detail with primary team, will continue to monitor closely with you

## 2024-01-14 NOTE — PROGRESS NOTE ADULT - SUBJECTIVE AND OBJECTIVE BOX
Patient discussed on morning rounds with       Operation / Date:     SUBJECTIVE ASSESSMENT:  73y Male         Vital Signs Last 24 Hrs  T(C): 36.2 (14 Jan 2024 06:31), Max: 36.8 (13 Jan 2024 22:03)  T(F): 97.1 (14 Jan 2024 06:31), Max: 98.2 (13 Jan 2024 22:03)  HR: 110 (14 Jan 2024 07:00) (104 - 114)  BP: 139/74 (14 Jan 2024 07:00) (117/59 - 152/78)  BP(mean): 97 (14 Jan 2024 07:00) (80 - 106)  RR: 15 (14 Jan 2024 07:00) (15 - 17)  SpO2: 97% (14 Jan 2024 07:00) (96% - 98%)    Parameters below as of 14 Jan 2024 07:00  Patient On (Oxygen Delivery Method): room air      I&O's Detail    13 Jan 2024 07:01  -  14 Jan 2024 07:00  --------------------------------------------------------  IN:    Lactated Ringers: 1150 mL    Oral Fluid: 180 mL    Sodium Chloride 0.9% Bolus: 50 mL  Total IN: 1380 mL    OUT:    Chest Tube (mL): 5 mL    VAC (Vacuum Assisted Closure) System (mL): 10 mL    Voided (mL): 600 mL  Total OUT: 615 mL    Total NET: 765 mL          CHEST TUBE:  Yes/No. AIR LEAKS: Yes/No. Suction / H2O SEAL.   DEON DRAIN:  Yes/No.  EPICARDIAL WIRES: Yes/No.  TIE DOWNS: Yes/No.  TRUJILLO: Yes/No.    PHYSICAL EXAM:    General:     Neurological:    Cardiovascular:    Respiratory:    Gastrointestinal:    Extremities:    Vascular:    Incision Sites:    LABS:                        8.2    11.53 )-----------( 346      ( 14 Jan 2024 05:30 )             25.1       COUMADIN:  Yes/No. REASON: .    PT/INR - ( 14 Jan 2024 05:30 )   PT: 12.1 sec;   INR: 1.06          PTT - ( 14 Jan 2024 05:30 )  PTT:32.0 sec    01-14    137  |  109<H>  |  45<H>  ----------------------------<  105<H>  4.1   |  18<L>  |  1.95<H>    Ca    10.4      14 Jan 2024 05:30  Mg     1.8     01-14    TPro  6.2  /  Alb  3.0<L>  /  TBili  0.3  /  DBili  x   /  AST  38  /  ALT  47<H>  /  AlkPhos  76  01-14      Urinalysis Basic - ( 14 Jan 2024 05:30 )    Color: x / Appearance: x / SG: x / pH: x  Gluc: 105 mg/dL / Ketone: x  / Bili: x / Urobili: x   Blood: x / Protein: x / Nitrite: x   Leuk Esterase: x / RBC: x / WBC x   Sq Epi: x / Non Sq Epi: x / Bacteria: x        MEDICATIONS  (STANDING):  atorvastatin 10 milliGRAM(s) Oral at bedtime  bisacodyl 5 milliGRAM(s) Oral at bedtime  famotidine    Tablet 10 milliGRAM(s) Oral daily  heparin   Injectable 5000 Unit(s) SubCutaneous every 8 hours  hydrALAZINE 25 milliGRAM(s) Oral every 8 hours  isoniazid 300 milliGRAM(s) Oral daily  labetalol 200 milliGRAM(s) Oral every 8 hours  lactated ringers. 1000 milliLiter(s) (50 mL/Hr) IV Continuous <Continuous>  levoFLOXacin  Tablet 750 milliGRAM(s) Oral every 48 hours  polyethylene glycol 3350 17 Gram(s) Oral daily  predniSONE   Tablet 60 milliGRAM(s) Oral daily  pyrazinamide 1500 milliGRAM(s) Oral <User Schedule>  pyridoxine 50 milliGRAM(s) Oral daily  rifAMPin 600 milliGRAM(s) Oral daily  senna 2 Tablet(s) Oral at bedtime  sodium bicarbonate 325 milliGRAM(s) Oral two times a day    MEDICATIONS  (PRN):  acetaminophen     Tablet .. 975 milliGRAM(s) Oral every 6 hours PRN Mild Pain (1 - 3)  oxyCODONE    IR 5 milliGRAM(s) Oral every 4 hours PRN Moderate Pain (4 - 6)        RADIOLOGY & ADDITIONAL TESTS:     Patient discussed on morning rounds with Dr. Vaca       Operation / Date: 1/3/2024, R VATS RA Pleural Biopsy and Decortication     SUBJECTIVE ASSESSMENT:  73y Male complaining of some mild pain around chest tube site. No other acute complaints.   Denies any fevers, chills, headache, lightheadedness, dizziness, chest pain, shortness of breath, abdominal pain, nausea, vomiting, changes in bowel or bladder, paresthesias, or any other acute complaint.        Vital Signs Last 24 Hrs  T(C): 36.2 (14 Jan 2024 06:31), Max: 36.8 (13 Jan 2024 22:03)  T(F): 97.1 (14 Jan 2024 06:31), Max: 98.2 (13 Jan 2024 22:03)  HR: 110 (14 Jan 2024 07:00) (104 - 114)  BP: 139/74 (14 Jan 2024 07:00) (117/59 - 152/78)  BP(mean): 97 (14 Jan 2024 07:00) (80 - 106)  RR: 15 (14 Jan 2024 07:00) (15 - 17)  SpO2: 97% (14 Jan 2024 07:00) (96% - 98%)    Parameters below as of 14 Jan 2024 07:00  Patient On (Oxygen Delivery Method): room air      I&O's Detail    13 Jan 2024 07:01  -  14 Jan 2024 07:00  --------------------------------------------------------  IN:    Lactated Ringers: 1150 mL    Oral Fluid: 180 mL    Sodium Chloride 0.9% Bolus: 50 mL  Total IN: 1380 mL    OUT:    Chest Tube (mL): 5 mL    VAC (Vacuum Assisted Closure) System (mL): 10 mL    Voided (mL): 600 mL  Total OUT: 615 mL    Total NET: 765 mL          CHEST TUBE:  YES, no leak, on suction .   DEON DRAIN: No.  EPICARDIAL WIRES: No.  TIE DOWNS: Yes  TERRY: No.    PHYSICAL EXAM:    General: Sitting in chair comfortably in NAD  Neuro: A&Ox3, no focal deficits   HEENT: NCAT, EOMI   Cardiac: Tachycardic rate and regular rhythm, normal S1 and S2. No m/r/g   Pulm: Breathing comfortably on RA. No signs of respiratory distress. Lungs are CTA b/l without wheezes, rales, or rhonchi. SubQ emphysema noted to be improving, crepitus still appreciated from R neck to R costal margin  Abdomen: Soft, non-distended, non-tender.   : No terry  Extremities: Warm and well perfused, no peripheral edema, distal pulses 2+. No calf tenderness. SCDs and TEDs in place  MSK: Full AROM   Wound: R VATS incision c/d/i. R blow hole site with wound vac in place, no leak       LABS:                        8.2    11.53 )-----------( 346      ( 14 Jan 2024 05:30 )             25.1       PT/INR - ( 14 Jan 2024 05:30 )   PT: 12.1 sec;   INR: 1.06          PTT - ( 14 Jan 2024 05:30 )  PTT:32.0 sec    01-14    137  |  109<H>  |  45<H>  ----------------------------<  105<H>  4.1   |  18<L>  |  1.95<H>    Ca    10.4      14 Jan 2024 05:30  Mg     1.8     01-14    TPro  6.2  /  Alb  3.0<L>  /  TBili  0.3  /  DBili  x   /  AST  38  /  ALT  47<H>  /  AlkPhos  76  01-14      Urinalysis Basic - ( 14 Jan 2024 05:30 )    Color: x / Appearance: x / SG: x / pH: x  Gluc: 105 mg/dL / Ketone: x  / Bili: x / Urobili: x   Blood: x / Protein: x / Nitrite: x   Leuk Esterase: x / RBC: x / WBC x   Sq Epi: x / Non Sq Epi: x / Bacteria: x        MEDICATIONS  (STANDING):  atorvastatin 10 milliGRAM(s) Oral at bedtime  bisacodyl 5 milliGRAM(s) Oral at bedtime  famotidine    Tablet 10 milliGRAM(s) Oral daily  heparin   Injectable 5000 Unit(s) SubCutaneous every 8 hours  hydrALAZINE 25 milliGRAM(s) Oral every 8 hours  isoniazid 300 milliGRAM(s) Oral daily  labetalol 200 milliGRAM(s) Oral every 8 hours  lactated ringers. 1000 milliLiter(s) (50 mL/Hr) IV Continuous <Continuous>  levoFLOXacin  Tablet 750 milliGRAM(s) Oral every 48 hours  polyethylene glycol 3350 17 Gram(s) Oral daily  predniSONE   Tablet 60 milliGRAM(s) Oral daily  pyrazinamide 1500 milliGRAM(s) Oral <User Schedule>  pyridoxine 50 milliGRAM(s) Oral daily  rifAMPin 600 milliGRAM(s) Oral daily  senna 2 Tablet(s) Oral at bedtime  sodium bicarbonate 325 milliGRAM(s) Oral two times a day    MEDICATIONS  (PRN):  acetaminophen     Tablet .. 975 milliGRAM(s) Oral every 6 hours PRN Mild Pain (1 - 3)  oxyCODONE    IR 5 milliGRAM(s) Oral every 4 hours PRN Moderate Pain (4 - 6)        RADIOLOGY & ADDITIONAL TESTS:  < from: CT Chest No Cont (01.13.24 @ 15:44) >  IMPRESSION:  1.  Small right pneumothorax. Right chest pigtail catheter in situ.  2.  Right chest wall tract communicating between pleura and subcutaneous   soft tissues  3.  Extensive pneumomediastinum and subcutaneous emphysema, greater on   the right chest wall.  4.  Thickening of the right oblique and minor fissures, likely   postsurgical changes.    < end of copied text >

## 2024-01-14 NOTE — PROGRESS NOTE ADULT - ASSESSMENT
73 year old M, Cantonese speaking, never a smoker, with PMHx of HTN, HLD, who is referred by Dr. Edward Jensen for an initial evaluation of pleural effusion. Patient reports right lower quadrant pain since Aug 2023, along with poor appetite, unintentionally weight loss around 20lbs, fatigue, progressively worse hoarseness and dyspnea. Pt underwent R VATS, pleural biopsy decortication, on 1/5 with Dr. Kaiser. Intraop path revealed necrotizing granuloma. ID consulted for TB workup and management. He arrived to [facility] post op with 2 CTs in place, on suction, with air leaks. POD2 AFB samples sent and obtained and he was started on antibiotic regimen per ID. POD3 AFB came back negative but on POD4 OR cultures came back positive for TB. One CT was removed with stable CXR. POD5 second CT removed. POD6 new left pneumothorax and increase in subcutaneous emphysema noted, left chest blowhole and pigtail were placed. Reported vision floaters so ethambutol discontinued and transitioned to Levofloxcin. POD 7, renal concerned that rifampin could be contributing to NARA, but per ID, rifampin is a crucial medication in the management of his TB, and give that Cr was elevated before pt was started on rifampin, suspicion is low for rifampin causing AIN from ID perspective. Will keep pt on rifampin for the time being and continue to monitor. On POD 7, there was increase in subQ emphysema down to scrotum. POD 8, pending CT chest, no SubQ emphysema in scrotum appreciated Maintaining pigtail to suction and wound vac to blow hole. POD 9, CT chest completed showing extensive subq emphysema and R small apical PTX.     Neuro:   Pain well controlled on PRN APAP and oxycodone   No delirium, no focal deficits     Cardiac:   Vital signs stable over last 24 hours   - HR: 104-110  - BP: 139-152/63-70  HTN:   - patient noted to be hypertensive and tachy, increasing labetolol to 200 mg TID -- will follow up   - continue hydralazine 25 mg TID   - holding home Norvasc 5mg, lorsartan 100 mg per interactions with rifampin   HLD:  - continue lipitor 10 mg    Monitor on Telemetry     Pulm:   POD 9 s/p R VATS, Pleural Biopsy, and Decortication c/b post-pull PTX and significant subq emphysema   - pigtail to suction without leak   - blow hole to decrease subq emphysema, -- wound vac in place, subq emphysema noted to be improving   - monitor subq emphysema    - CT chest completed  Saturating well on room air   Encouraging IS   CXR: significant subq emphysema   TB   - on rifampin, isoniazid, pyrazinamide and levofloxacin (changed from ethambutol 2/2 "floaters")   - prophylactic pyridoxine in place   - still on isolation   - CT chest without cavitary lesions      GI:   Tolerating diet well   GI PPx: Pepcid   Continue with bowel regimen     Renal:   Elevated Cr   - BUN/Cr with slight downtrend to 45/1.95   - nephrology following -- sending repeat urine lytes per team   - will continue with gentle hydration of LR at 50 cc/hr   - on prednisone and sodium bicarbonate   Monitor I/Os   - voiding well     Heme:   H&H stable @ 8.2/25.1   DVT prophylaxis: SCDs and SQH 5000U     ID:   Afebrile, WBC stable @ 13.2  Continue to monitor for fever, leukocytosis, or SIRS/sepsis   TB   - continue current ABx regimen as above   - AARON to be contacted in regards to home iso status   - AFB negative, intra-op cultures positive -- more likley latent TB/     MSK:   Encourage ambulation   PT/OT     Endocrine:   No Hx of DM or Thyroid Disease  - A1C: 5.2  - TSH: 0.928   Monitor blood glucose levels     Wound:  Monitor for SSI   Maintain wound vac to blow hole     Dispo:   Inpatient management    73 year old M, Cantonese speaking, never a smoker, with PMHx of HTN, HLD, who is referred by Dr. Edward Jensen for an initial evaluation of pleural effusion. Patient reports right lower quadrant pain since Aug 2023, along with poor appetite, unintentionally weight loss around 20lbs, fatigue, progressively worse hoarseness and dyspnea. Pt underwent R VATS, pleural biopsy decortication, on 1/5 with Dr. Kaiser. Intraop path revealed necrotizing granuloma. ID consulted for TB workup and management. He arrived to [facility] post op with 2 CTs in place, on suction, with air leaks. POD2 AFB samples sent and obtained and he was started on antibiotic regimen per ID. POD3 AFB came back negative but on POD4 OR cultures came back positive for TB. One CT was removed with stable CXR. POD5 second CT removed. POD6 new left pneumothorax and increase in subcutaneous emphysema noted, left chest blowhole and pigtail were placed. Reported vision floaters so ethambutol discontinued and transitioned to Levofloxcin. POD 7, renal concerned that rifampin could be contributing to NARA, but per ID, rifampin is a crucial medication in the management of his TB, and give that Cr was elevated before pt was started on rifampin, suspicion is low for rifampin causing AIN from ID perspective. Will keep pt on rifampin for the time being and continue to monitor. On POD 7, there was increase in subQ emphysema down to scrotum. POD 8, pending CT chest, no SubQ emphysema in scrotum appreciated Maintaining pigtail to suction and wound vac to blow hole. POD 9, CT chest completed showing extensive subq emphysema and R small apical PTX.     Neuro:   Pain well controlled on PRN APAP and oxycodone   No delirium, no focal deficits     Cardiac:   Vital signs stable over last 24 hours   - HR: 104-110  - BP: 139-152/63-70  HTN:   - patient noted to be hypertensive and tachy, increasing labetolol to 200 mg TID -- will follow up  - continue hydralazine 25 mg TID   - holding home Norvasc 5mg, losartan 100 mg per interactions with rifampin   HLD:  - continue Lipitor 10 mg    Monitor on Telemetry     Pulm:   POD 9 s/p R VATS, Pleural Biopsy, and Decortication c/b post-pull PTX and significant subq emphysema   - pigtail to suction without leak   - blow hole to decrease subq emphysema, -- wound vac in place, subq emphysema noted to be improving   - monitor subq emphysema    - CT chest completed  Saturating well on room air   Encouraging IS   CXR: significant subq emphysema   TB   - on rifampin, isoniazid, pyrazinamide and levofloxacin (changed from ethambutol 2/2 "floaters")   - prophylactic pyridoxine in place   - still on isolation   - CT chest without cavitary lesions      GI:   Tolerating diet well   GI PPx: Pepcid   Continue with bowel regimen     Renal:   Elevated Cr   - BUN/Cr with slight downtrend to 45/1.95   - nephrology following -- sending repeat urine lytes per team   - will continue with gentle hydration of LR at 50 cc/hr   - on prednisone and sodium bicarbonate   Monitor I/Os   - voiding well     Heme:   H&H stable @ 8.2/25.1   DVT prophylaxis: SCDs and SQH 5000U     ID:   Afebrile, WBC stable @ 13.2  Continue to monitor for fever, leukocytosis, or SIRS/sepsis   TB   - continue current ABx regimen as above   - AARON to be contacted in regards to home iso status   - AFB negative, intra-op cultures positive -- more likley latent TB/     MSK:   Encourage ambulation   PT/OT     Endocrine:   No Hx of DM or Thyroid Disease  - A1C: 5.2  - TSH: 0.928   Monitor blood glucose levels     Wound:  Monitor for SSI   Maintain wound vac to blow hole     Dispo:   Inpatient management

## 2024-01-15 LAB
ANION GAP SERPL CALC-SCNC: 7 MMOL/L — SIGNIFICANT CHANGE UP (ref 5–17)
ANION GAP SERPL CALC-SCNC: 7 MMOL/L — SIGNIFICANT CHANGE UP (ref 5–17)
APTT BLD: 34.6 SEC — SIGNIFICANT CHANGE UP (ref 24.5–35.6)
APTT BLD: 34.6 SEC — SIGNIFICANT CHANGE UP (ref 24.5–35.6)
BUN SERPL-MCNC: 42 MG/DL — HIGH (ref 7–23)
BUN SERPL-MCNC: 42 MG/DL — HIGH (ref 7–23)
CALCIUM SERPL-MCNC: 10.2 MG/DL — SIGNIFICANT CHANGE UP (ref 8.4–10.5)
CALCIUM SERPL-MCNC: 10.2 MG/DL — SIGNIFICANT CHANGE UP (ref 8.4–10.5)
CHLORIDE SERPL-SCNC: 108 MMOL/L — SIGNIFICANT CHANGE UP (ref 96–108)
CHLORIDE SERPL-SCNC: 108 MMOL/L — SIGNIFICANT CHANGE UP (ref 96–108)
CO2 SERPL-SCNC: 22 MMOL/L — SIGNIFICANT CHANGE UP (ref 22–31)
CO2 SERPL-SCNC: 22 MMOL/L — SIGNIFICANT CHANGE UP (ref 22–31)
CREAT SERPL-MCNC: 1.82 MG/DL — HIGH (ref 0.5–1.3)
CREAT SERPL-MCNC: 1.82 MG/DL — HIGH (ref 0.5–1.3)
EGFR: 39 ML/MIN/1.73M2 — LOW
EGFR: 39 ML/MIN/1.73M2 — LOW
GLUCOSE SERPL-MCNC: 106 MG/DL — HIGH (ref 70–99)
GLUCOSE SERPL-MCNC: 106 MG/DL — HIGH (ref 70–99)
HCT VFR BLD CALC: 24.8 % — LOW (ref 39–50)
HCT VFR BLD CALC: 24.8 % — LOW (ref 39–50)
HGB BLD-MCNC: 7.9 G/DL — LOW (ref 13–17)
HGB BLD-MCNC: 7.9 G/DL — LOW (ref 13–17)
INR BLD: 1.1 — SIGNIFICANT CHANGE UP (ref 0.85–1.18)
INR BLD: 1.1 — SIGNIFICANT CHANGE UP (ref 0.85–1.18)
MAGNESIUM SERPL-MCNC: 1.8 MG/DL — SIGNIFICANT CHANGE UP (ref 1.6–2.6)
MAGNESIUM SERPL-MCNC: 1.8 MG/DL — SIGNIFICANT CHANGE UP (ref 1.6–2.6)
MCHC RBC-ENTMCNC: 29.9 PG — SIGNIFICANT CHANGE UP (ref 27–34)
MCHC RBC-ENTMCNC: 29.9 PG — SIGNIFICANT CHANGE UP (ref 27–34)
MCHC RBC-ENTMCNC: 31.9 GM/DL — LOW (ref 32–36)
MCHC RBC-ENTMCNC: 31.9 GM/DL — LOW (ref 32–36)
MCV RBC AUTO: 93.9 FL — SIGNIFICANT CHANGE UP (ref 80–100)
MCV RBC AUTO: 93.9 FL — SIGNIFICANT CHANGE UP (ref 80–100)
NRBC # BLD: 0 /100 WBCS — SIGNIFICANT CHANGE UP (ref 0–0)
NRBC # BLD: 0 /100 WBCS — SIGNIFICANT CHANGE UP (ref 0–0)
PLATELET # BLD AUTO: 339 K/UL — SIGNIFICANT CHANGE UP (ref 150–400)
PLATELET # BLD AUTO: 339 K/UL — SIGNIFICANT CHANGE UP (ref 150–400)
POTASSIUM SERPL-MCNC: 4.5 MMOL/L — SIGNIFICANT CHANGE UP (ref 3.5–5.3)
POTASSIUM SERPL-MCNC: 4.5 MMOL/L — SIGNIFICANT CHANGE UP (ref 3.5–5.3)
POTASSIUM SERPL-SCNC: 4.5 MMOL/L — SIGNIFICANT CHANGE UP (ref 3.5–5.3)
POTASSIUM SERPL-SCNC: 4.5 MMOL/L — SIGNIFICANT CHANGE UP (ref 3.5–5.3)
PROTHROM AB SERPL-ACNC: 12.5 SEC — SIGNIFICANT CHANGE UP (ref 9.5–13)
PROTHROM AB SERPL-ACNC: 12.5 SEC — SIGNIFICANT CHANGE UP (ref 9.5–13)
RBC # BLD: 2.64 M/UL — LOW (ref 4.2–5.8)
RBC # BLD: 2.64 M/UL — LOW (ref 4.2–5.8)
RBC # FLD: 18.6 % — HIGH (ref 10.3–14.5)
RBC # FLD: 18.6 % — HIGH (ref 10.3–14.5)
SODIUM SERPL-SCNC: 137 MMOL/L — SIGNIFICANT CHANGE UP (ref 135–145)
SODIUM SERPL-SCNC: 137 MMOL/L — SIGNIFICANT CHANGE UP (ref 135–145)
WBC # BLD: 10.49 K/UL — SIGNIFICANT CHANGE UP (ref 3.8–10.5)
WBC # BLD: 10.49 K/UL — SIGNIFICANT CHANGE UP (ref 3.8–10.5)
WBC # FLD AUTO: 10.49 K/UL — SIGNIFICANT CHANGE UP (ref 3.8–10.5)
WBC # FLD AUTO: 10.49 K/UL — SIGNIFICANT CHANGE UP (ref 3.8–10.5)

## 2024-01-15 PROCEDURE — 99232 SBSQ HOSP IP/OBS MODERATE 35: CPT

## 2024-01-15 PROCEDURE — 71045 X-RAY EXAM CHEST 1 VIEW: CPT | Mod: 26

## 2024-01-15 RX ORDER — MAGNESIUM OXIDE 400 MG ORAL TABLET 241.3 MG
400 TABLET ORAL ONCE
Refills: 0 | Status: COMPLETED | OUTPATIENT
Start: 2024-01-15 | End: 2024-01-15

## 2024-01-15 RX ADMIN — Medication 200 MILLIGRAM(S): at 05:43

## 2024-01-15 RX ADMIN — OXYCODONE HYDROCHLORIDE 5 MILLIGRAM(S): 5 TABLET ORAL at 16:14

## 2024-01-15 RX ADMIN — Medication 975 MILLIGRAM(S): at 03:00

## 2024-01-15 RX ADMIN — Medication 975 MILLIGRAM(S): at 02:02

## 2024-01-15 RX ADMIN — PYRAZINAMIDE 1500 MILLIGRAM(S): 500 TABLET ORAL at 05:43

## 2024-01-15 RX ADMIN — OXYCODONE HYDROCHLORIDE 5 MILLIGRAM(S): 5 TABLET ORAL at 16:10

## 2024-01-15 RX ADMIN — Medication 975 MILLIGRAM(S): at 19:28

## 2024-01-15 RX ADMIN — Medication 300 MILLIGRAM(S): at 11:51

## 2024-01-15 RX ADMIN — MAGNESIUM OXIDE 400 MG ORAL TABLET 400 MILLIGRAM(S): 241.3 TABLET ORAL at 11:53

## 2024-01-15 RX ADMIN — Medication 50 MILLIGRAM(S): at 11:50

## 2024-01-15 RX ADMIN — HEPARIN SODIUM 5000 UNIT(S): 5000 INJECTION INTRAVENOUS; SUBCUTANEOUS at 05:42

## 2024-01-15 RX ADMIN — Medication 25 MILLIGRAM(S): at 00:56

## 2024-01-15 RX ADMIN — Medication 975 MILLIGRAM(S): at 18:16

## 2024-01-15 RX ADMIN — Medication 200 MILLIGRAM(S): at 14:55

## 2024-01-15 RX ADMIN — Medication 325 MILLIGRAM(S): at 05:44

## 2024-01-15 RX ADMIN — POLYETHYLENE GLYCOL 3350 17 GRAM(S): 17 POWDER, FOR SOLUTION ORAL at 11:49

## 2024-01-15 RX ADMIN — Medication 200 MILLIGRAM(S): at 21:28

## 2024-01-15 RX ADMIN — Medication 975 MILLIGRAM(S): at 12:56

## 2024-01-15 RX ADMIN — Medication 325 MILLIGRAM(S): at 18:16

## 2024-01-15 RX ADMIN — ATORVASTATIN CALCIUM 10 MILLIGRAM(S): 80 TABLET, FILM COATED ORAL at 21:28

## 2024-01-15 RX ADMIN — Medication 975 MILLIGRAM(S): at 11:52

## 2024-01-15 RX ADMIN — HEPARIN SODIUM 5000 UNIT(S): 5000 INJECTION INTRAVENOUS; SUBCUTANEOUS at 14:54

## 2024-01-15 RX ADMIN — HEPARIN SODIUM 5000 UNIT(S): 5000 INJECTION INTRAVENOUS; SUBCUTANEOUS at 21:28

## 2024-01-15 RX ADMIN — FAMOTIDINE 10 MILLIGRAM(S): 10 INJECTION INTRAVENOUS at 11:53

## 2024-01-15 RX ADMIN — Medication 60 MILLIGRAM(S): at 05:42

## 2024-01-15 NOTE — PROGRESS NOTE ADULT - SUBJECTIVE AND OBJECTIVE BOX
GUILLAUME MANNING , [id number],  [facility] [id number]    Time of encounter :  10 am  seen with wife at the bedside  feeling much better  getting IVF at 50 cc per hour,   had bowel movement  decrease sq emphysema -saturating well on room air, heart rate is in 100s now much improved.     T(C): 36.6 (01-15-24 @ 09:02), Max: 36.6 (01-14-24 @ 21:30)  HR: 101 (01-15-24 @ 10:00) (90 - 106)  BP: 115/56 (01-15-24 @ 10:00) (95/55 - 143/75)  RR: 15 (01-15-24 @ 10:00) (14 - 16)  SpO2: 97% (01-15-24 @ 10:00) (96% - 98%)    acetaminophen     Tablet .. 975 milliGRAM(s) Oral every 6 hours PRN  atorvastatin 10 milliGRAM(s) Oral at bedtime  bisacodyl 5 milliGRAM(s) Oral at bedtime  famotidine    Tablet 10 milliGRAM(s) Oral daily  heparin   Injectable 5000 Unit(s) SubCutaneous every 8 hours  hydrALAZINE 25 milliGRAM(s) Oral every 8 hours  isoniazid 300 milliGRAM(s) Oral daily  labetalol 200 milliGRAM(s) Oral every 8 hours  lactated ringers. 1000 milliLiter(s) IV Continuous <Continuous>  levoFLOXacin  Tablet 750 milliGRAM(s) Oral every 48 hours  magnesium oxide 400 milliGRAM(s) Oral once  oxyCODONE    IR 5 milliGRAM(s) Oral every 4 hours PRN  polyethylene glycol 3350 17 Gram(s) Oral daily  predniSONE   Tablet 60 milliGRAM(s) Oral daily  pyrazinamide 1500 milliGRAM(s) Oral <User Schedule>  pyridoxine 50 milliGRAM(s) Oral daily  rifAMPin 600 milliGRAM(s) Oral daily  senna 2 Tablet(s) Oral at bedtime  sodium bicarbonate 325 milliGRAM(s) Oral two times a day      Physical Exam :    General exam :  lying flat in bed, saturating well on room air  subq emphysema on bilateral cheeks, neck, arms -chest- less today  blow hole in place  right pigtail in place= moving good air   bs present, soft,  LE- no sq emphysema -no edema noted.   neuro - non focal.     CBC Full  -  ( 15 Edgardo 2024 08:21 )  WBC Count : 10.49 K/uL  RBC Count : 2.64 M/uL  Hemoglobin : 7.9 g/dL  Hematocrit : 24.8 %  Platelet Count - Automated : 339 K/uL  Mean Cell Volume : 93.9 fl  Mean Cell Hemoglobin : 29.9 pg  Mean Cell Hemoglobin Concentration : 31.9 gm/dL  Auto Neutrophil # : x  Auto Lymphocyte # : x  Auto Monocyte # : x  Auto Eosinophil # : x  Auto Basophil # : x  Auto Neutrophil % : x  Auto Lymphocyte % : x  Auto Monocyte % : x  Auto Eosinophil % : x  Auto Basophil % : x        01-15    137  |  108  |  42<H>  ----------------------------<  106<H>  4.5   |  22  |  1.82<H>    Ca    10.2      15 Edgardo 2024 08:21  Mg     1.8     01-15    TPro  6.2  /  Alb  3.0<L>  /  TBili  0.3  /  DBili  x   /  AST  38  /  ALT  47<H>  /  AlkPhos  76  01-14    Daily     Daily   CAPILLARY BLOOD GLUCOSE          Urinalysis Basic - ( 15 Edgardo 2024 08:21 )    Color: x / Appearance: x / SG: x / pH: x  Gluc: 106 mg/dL / Ketone: x  / Bili: x / Urobili: x   Blood: x / Protein: x / Nitrite: x   Leuk Esterase: x / RBC: x / WBC x   Sq Epi: x / Non Sq Epi: x / Bacteria: x        PT/INR - ( 15 Edgardo 2024 08:21 )   PT: 12.5 sec;   INR: 1.10          PTT - ( 15 Edgardo 2024 08:21 )  PTT:34.6 sec           GUILLAUME MANNING , [id number],  [facility] [address]    Time of encounter :  10 am  seen with wife at the bedside  feeling much better  getting IVF at 50 cc per hour,   had bowel movement  decrease sq emphysema -saturating well on room air, heart rate is in 100s now much improved.     T(C): 36.6 (01-15-24 @ 09:02), Max: 36.6 (01-14-24 @ 21:30)  HR: 101 (01-15-24 @ 10:00) (90 - 106)  BP: 115/56 (01-15-24 @ 10:00) (95/55 - 143/75)  RR: 15 (01-15-24 @ 10:00) (14 - 16)  SpO2: 97% (01-15-24 @ 10:00) (96% - 98%)    acetaminophen     Tablet .. 975 milliGRAM(s) Oral every 6 hours PRN  atorvastatin 10 milliGRAM(s) Oral at bedtime  bisacodyl 5 milliGRAM(s) Oral at bedtime  famotidine    Tablet 10 milliGRAM(s) Oral daily  heparin   Injectable 5000 Unit(s) SubCutaneous every 8 hours  hydrALAZINE 25 milliGRAM(s) Oral every 8 hours  isoniazid 300 milliGRAM(s) Oral daily  labetalol 200 milliGRAM(s) Oral every 8 hours  lactated ringers. 1000 milliLiter(s) IV Continuous <Continuous>  levoFLOXacin  Tablet 750 milliGRAM(s) Oral every 48 hours  magnesium oxide 400 milliGRAM(s) Oral once  oxyCODONE    IR 5 milliGRAM(s) Oral every 4 hours PRN  polyethylene glycol 3350 17 Gram(s) Oral daily  predniSONE   Tablet 60 milliGRAM(s) Oral daily  pyrazinamide 1500 milliGRAM(s) Oral <User Schedule>  pyridoxine 50 milliGRAM(s) Oral daily  rifAMPin 600 milliGRAM(s) Oral daily  senna 2 Tablet(s) Oral at bedtime  sodium bicarbonate 325 milliGRAM(s) Oral two times a day      Physical Exam :    General exam :  lying flat in bed, saturating well on room air  subq emphysema on bilateral cheeks, neck, arms -chest- less today  blow hole in place  right pigtail in place= moving good air   bs present, soft,  LE- no sq emphysema -no edema noted.   neuro - non focal.     CBC Full  -  ( 15 Edgardo 2024 08:21 )  WBC Count : 10.49 K/uL  RBC Count : 2.64 M/uL  Hemoglobin : 7.9 g/dL  Hematocrit : 24.8 %  Platelet Count - Automated : 339 K/uL  Mean Cell Volume : 93.9 fl  Mean Cell Hemoglobin : 29.9 pg  Mean Cell Hemoglobin Concentration : 31.9 gm/dL  Auto Neutrophil # : x  Auto Lymphocyte # : x  Auto Monocyte # : x  Auto Eosinophil # : x  Auto Basophil # : x  Auto Neutrophil % : x  Auto Lymphocyte % : x  Auto Monocyte % : x  Auto Eosinophil % : x  Auto Basophil % : x        01-15    137  |  108  |  42<H>  ----------------------------<  106<H>  4.5   |  22  |  1.82<H>    Ca    10.2      15 Edgardo 2024 08:21  Mg     1.8     01-15    TPro  6.2  /  Alb  3.0<L>  /  TBili  0.3  /  DBili  x   /  AST  38  /  ALT  47<H>  /  AlkPhos  76  01-14    Daily     Daily   CAPILLARY BLOOD GLUCOSE          Urinalysis Basic - ( 15 Edgardo 2024 08:21 )    Color: x / Appearance: x / SG: x / pH: x  Gluc: 106 mg/dL / Ketone: x  / Bili: x / Urobili: x   Blood: x / Protein: x / Nitrite: x   Leuk Esterase: x / RBC: x / WBC x   Sq Epi: x / Non Sq Epi: x / Bacteria: x        PT/INR - ( 15 Edgardo 2024 08:21 )   PT: 12.5 sec;   INR: 1.10          PTT - ( 15 Edgardo 2024 08:21 )  PTT:34.6 sec           GUILLAUME MANNING , [id number],  [facility] [address] [address]    Time of encounter :  10 am  seen with wife at the bedside  feeling much better  getting IVF at 50 cc per hour,   had bowel movement  decrease sq emphysema -saturating well on room air, heart rate is in 100s now much improved.     T(C): 36.6 (01-15-24 @ 09:02), Max: 36.6 (01-14-24 @ 21:30)  HR: 101 (01-15-24 @ 10:00) (90 - 106)  BP: 115/56 (01-15-24 @ 10:00) (95/55 - 143/75)  RR: 15 (01-15-24 @ 10:00) (14 - 16)  SpO2: 97% (01-15-24 @ 10:00) (96% - 98%)    acetaminophen     Tablet .. 975 milliGRAM(s) Oral every 6 hours PRN  atorvastatin 10 milliGRAM(s) Oral at bedtime  bisacodyl 5 milliGRAM(s) Oral at bedtime  famotidine    Tablet 10 milliGRAM(s) Oral daily  heparin   Injectable 5000 Unit(s) SubCutaneous every 8 hours  hydrALAZINE 25 milliGRAM(s) Oral every 8 hours  isoniazid 300 milliGRAM(s) Oral daily  labetalol 200 milliGRAM(s) Oral every 8 hours  lactated ringers. 1000 milliLiter(s) IV Continuous <Continuous>  levoFLOXacin  Tablet 750 milliGRAM(s) Oral every 48 hours  magnesium oxide 400 milliGRAM(s) Oral once  oxyCODONE    IR 5 milliGRAM(s) Oral every 4 hours PRN  polyethylene glycol 3350 17 Gram(s) Oral daily  predniSONE   Tablet 60 milliGRAM(s) Oral daily  pyrazinamide 1500 milliGRAM(s) Oral <User Schedule>  pyridoxine 50 milliGRAM(s) Oral daily  rifAMPin 600 milliGRAM(s) Oral daily  senna 2 Tablet(s) Oral at bedtime  sodium bicarbonate 325 milliGRAM(s) Oral two times a day      Physical Exam :    General exam :  lying flat in bed, saturating well on room air  subq emphysema on bilateral cheeks, neck, arms -chest- less today  blow hole in place  right pigtail in place= moving good air   bs present, soft,  LE- no sq emphysema -no edema noted.   neuro - non focal.     CBC Full  -  ( 15 Edgardo 2024 08:21 )  WBC Count : 10.49 K/uL  RBC Count : 2.64 M/uL  Hemoglobin : 7.9 g/dL  Hematocrit : 24.8 %  Platelet Count - Automated : 339 K/uL  Mean Cell Volume : 93.9 fl  Mean Cell Hemoglobin : 29.9 pg  Mean Cell Hemoglobin Concentration : 31.9 gm/dL  Auto Neutrophil # : x  Auto Lymphocyte # : x  Auto Monocyte # : x  Auto Eosinophil # : x  Auto Basophil # : x  Auto Neutrophil % : x  Auto Lymphocyte % : x  Auto Monocyte % : x  Auto Eosinophil % : x  Auto Basophil % : x        01-15    137  |  108  |  42<H>  ----------------------------<  106<H>  4.5   |  22  |  1.82<H>    Ca    10.2      15 Edgardo 2024 08:21  Mg     1.8     01-15    TPro  6.2  /  Alb  3.0<L>  /  TBili  0.3  /  DBili  x   /  AST  38  /  ALT  47<H>  /  AlkPhos  76  01-14    Daily     Daily   CAPILLARY BLOOD GLUCOSE          Urinalysis Basic - ( 15 Edgardo 2024 08:21 )    Color: x / Appearance: x / SG: x / pH: x  Gluc: 106 mg/dL / Ketone: x  / Bili: x / Urobili: x   Blood: x / Protein: x / Nitrite: x   Leuk Esterase: x / RBC: x / WBC x   Sq Epi: x / Non Sq Epi: x / Bacteria: x        PT/INR - ( 15 Edgardo 2024 08:21 )   PT: 12.5 sec;   INR: 1.10          PTT - ( 15 Edgardo 2024 08:21 )  PTT:34.6 sec

## 2024-01-15 NOTE — PROGRESS NOTE ADULT - ASSESSMENT
73 year old M, Cantonese speaking, never a smoker, with PMHx of HTN, HLD, CKD3,BPH,  Gout, hx Shingles right arm ( 10/2023), hx RLQ pain since ( 8/2023) along with poor appetite, unintentional weight loss around 20 lbs, and progressively worse hoarseness and dyspnea. recent EGD and colonoscopy unremarkable. Pt was referred by Heme Onc Dr. Edward Jensen for an initial evaluation of right pleural effusion. Pt underwent 	R VATS RA Pleural biopsy and decortication (Frozen + Necrotizing granuloma) 1/5/24- pleural AFB positive, sputum AFB smear negative x 3 ( culture pending ) on RIBE + B 6 - ethambutol dc due to floaters, started on levaquin, - post CT removal, right -pneumothorax-  pig tail place 1/11-, significant subq emphysemia - blow hole with wound vac,  pod # 10    - 9LA for HD monitoring   - pig tail , blow hole with wound vac- care by CTS appreciated.  saturating well on room air-decrease amount of subq emphysema.  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  "1.  Continue Rifampin 600 mg PO daily  2.  Continue  mg PO daily  3.  Stop Ethambutol 1200 mg PO three times a week (M, W, F). Patient is having vision changes and seeing floaters now, which is known side effect of Ethambutol.  4.  Start Levaquin 750mg PO q 48 hours  5.  Continue pyrazinamide 1500 mg PO three times a week (M, W, F)  6.  Continue vitamin B6 50 mg PO daily"    - ID contacted  [facility]-  AARON called and discussed with wife 1/10/24- ( not contagious to others) to follow up with AARON    - Renal f/u appreciated, for NARA on CKD3 (baseline Cr 1.8) : S Cr baseline 1.91( 12/15/23)-> 2.71 ( 1/5)-> 2.52( 1/6)-> 2.13( 1/7)-> 2.23( 1/8)-> 2.05(1/9) , UA noted w. mild proteinuria--> 1.9 on 1/14--> 1.8 1/15-  IVF and tolerating oral   - ANCA negative  - renal concern AIN started on prednisone 60 mg # 5. continue to monitor renal function.   - to ensure adequate hydration  - dw renal Dr. Cunningham    - Hypercalcemia: could be related to MTB. - avoid calcium supplement and vitamin D , adequate hydration.  -  trend BMP for S Cr and avoid nephrotoxic meds/NSAIDS/iv contrast dye     - pain control   - acetaminophen     Tablet .. 975 milliGRAM(s) Oral every 6 hours PRN  - oxyCODONE    IR 5 milliGRAM(s) Oral every 6 hours PRN  - bowel regimen:   - bisacodyl 5 milliGRAM(s) Oral at bedtime  - polyethylene glycol 3350 17 Gram(s) Oral daily- to restart  - senna 2 Tablet(s) Oral at bedtime-to restart.    - HTN: and tachycardia.   - could be contributing from pain, pt and family educated, to ask for pain meds.   - currently on Hydralazine 25 mg q 8 hourly  - labetalol 200 mg tid.      - HLD: atorvastatin 10 milliGRAM(s) Oral at bedtime  - GI ppx: pantoprazole    Tablet 40 milliGRAM(s) Oral before breakfast  - DVT ppx: heparin   Injectable 5000 Unit(s) SubCutaneous every 8 hours    Contacts:   Referring Heme Onc Dr Joanne Jensen     POC as d/w family and CTS PA, wife, ID.              73 year old M, Cantonese speaking, never a smoker, with PMHx of HTN, HLD, CKD3,BPH,  Gout, hx Shingles right arm ( 10/2023), hx RLQ pain since ( 8/2023) along with poor appetite, unintentional weight loss around 20 lbs, and progressively worse hoarseness and dyspnea. recent EGD and colonoscopy unremarkable. Pt was referred by Heme Onc Dr. Edward Jensen for an initial evaluation of right pleural effusion. Pt underwent 	R VATS RA Pleural biopsy and decortication (Frozen + Necrotizing granuloma) 1/5/24- pleural AFB positive, sputum AFB smear negative x 3 ( culture pending ) on RIBE + B 6 - ethambutol dc due to floaters, started on levaquin, - post CT removal, right -pneumothorax-  pig tail place 1/11-, significant subq emphysemia - blow hole with wound vac,  pod # 10    - 9LA for HD monitoring   - pig tail , blow hole with wound vac- care by CTS appreciated.  saturating well on room air-decrease amount of subq emphysema.  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  "1.  Continue Rifampin 600 mg PO daily  2.  Continue  mg PO daily  3.  Stop Ethambutol 1200 mg PO three times a week (M, W, F). Patient is having vision changes and seeing floaters now, which is known side effect of Ethambutol.  4.  Start Levaquin 750mg PO q 48 hours  5.  Continue pyrazinamide 1500 mg PO three times a week (M, W, F)  6.  Continue vitamin B6 50 mg PO daily"    - ID contacted  [facility]-  AARON called and discussed with wife 1/10/24- ( not contagious to others) to follow up with AARON    - Renal f/u appreciated, for NARA on CKD3 (baseline Cr 1.8) : S Cr baseline 1.91( 12/15/23)-> 2.71 ( 1/5)-> 2.52( 1/6)-> 2.13( 1/7)-> 2.23( 1/8)-> 2.05(1/9) , UA noted w. mild proteinuria--> 1.9 on 1/14--> 1.8 1/15-  IVF and tolerating oral   - ANCA negative  - renal concern AIN started on prednisone 60 mg # 5. continue to monitor renal function.   - to ensure adequate hydration  - dw renal Dr. Cunningham    - Hypercalcemia: could be related to MTB. - avoid calcium supplement and vitamin D , adequate hydration.  -  trend BMP for S Cr and avoid nephrotoxic meds/NSAIDS/iv contrast dye     - pain control   - acetaminophen     Tablet .. 975 milliGRAM(s) Oral every 6 hours PRN  - oxyCODONE    IR 5 milliGRAM(s) Oral every 6 hours PRN  - bowel regimen:   - bisacodyl 5 milliGRAM(s) Oral at bedtime  - polyethylene glycol 3350 17 Gram(s) Oral daily- to restart  - senna 2 Tablet(s) Oral at bedtime-to restart.    - HTN: and tachycardia.   - could be contributing from pain, pt and family educated, to ask for pain meds.   - currently on Hydralazine 25 mg q 8 hourly  - labetalol 200 mg tid.      - HLD: atorvastatin 10 milliGRAM(s) Oral at bedtime  - GI ppx: pantoprazole    Tablet 40 milliGRAM(s) Oral before breakfast  - DVT ppx: heparin   Injectable 5000 Unit(s) SubCutaneous every 8 hours    Contacts:   Referring Heme Onc Dr Joanne Jensen     POC as d/w family and CTS PA, wife, ID.              73 year old M, Cantonese speaking, never a smoker, with PMHx of HTN, HLD, CKD3,BPH,  Gout, hx Shingles right arm ( 10/2023), hx RLQ pain since ( 8/2023) along with poor appetite, unintentional weight loss around 20 lbs, and progressively worse hoarseness and dyspnea. recent EGD and colonoscopy unremarkable. Pt was referred by Heme Onc Dr. Edward Jensen for an initial evaluation of right pleural effusion. Pt underwent 	R VATS RA Pleural biopsy and decortication (Frozen + Necrotizing granuloma) 1/5/24- pleural AFB positive, sputum AFB smear negative x 3 ( culture pending ) on RIBE + B 6 - ethambutol dc due to floaters, started on levaquin, - post CT removal, right -pneumothorax-  pig tail place 1/11-, significant subq emphysemia - blow hole with wound vac,  pod # 10    - 9LA for HD monitoring   - pig tail , blow hole with wound vac- care by CTS appreciated.  saturating well on room air-decrease amount of subq emphysema.  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  "1.  Continue Rifampin 600 mg PO daily  2.  Continue  mg PO daily  3.  Stop Ethambutol 1200 mg PO three times a week (M, W, F). Patient is having vision changes and seeing floaters now, which is known side effect of Ethambutol.  4.  Start Levaquin 750mg PO q 48 hours  5.  Continue pyrazinamide 1500 mg PO three times a week (M, W, F)  6.  Continue vitamin B6 50 mg PO daily"    - ID contacted  [facility]-  AARON called and discussed with wife 1/10/24- ( not contagious to others) to follow up with AARON    - Renal f/u appreciated, for NARA on CKD3 (baseline Cr 1.8) : S Cr baseline 1.91( 12/15/23)-> 2.71 ( 1/5)-> 2.52( 1/6)-> 2.13( 1/7)-> 2.23( 1/8)-> 2.05(1/9) , UA noted w. mild proteinuria--> 1.9 on 1/14--> 1.8 1/15-  IVF and tolerating oral   - ANCA negative  - renal concern AIN started on prednisone 60 mg # 5. continue to monitor renal function.   - to ensure adequate hydration  - dw renal Dr. Cunningham    - Hypercalcemia: could be related to MTB. - avoid calcium supplement and vitamin D , adequate hydration.  -  trend BMP for S Cr and avoid nephrotoxic meds/NSAIDS/iv contrast dye     - pain control   - acetaminophen     Tablet .. 975 milliGRAM(s) Oral every 6 hours PRN  - oxyCODONE    IR 5 milliGRAM(s) Oral every 6 hours PRN  - bowel regimen:   - bisacodyl 5 milliGRAM(s) Oral at bedtime  - polyethylene glycol 3350 17 Gram(s) Oral daily- to restart  - senna 2 Tablet(s) Oral at bedtime-to restart.    - HTN: and tachycardia.   - could be contributing from pain, pt and family educated, to ask for pain meds.   - currently on Hydralazine 25 mg q 8 hourly  - labetalol 200 mg tid.      - HLD: atorvastatin 10 milliGRAM(s) Oral at bedtime  - GI ppx: pantoprazole    Tablet 40 milliGRAM(s) Oral before breakfast  - DVT ppx: heparin   Injectable 5000 Unit(s) SubCutaneous every 8 hours    Contacts:   Referring Heme Onc Dr Joanne Jensen     POC as d/w family and CTS PA, wife, ID.

## 2024-01-15 NOTE — PROGRESS NOTE ADULT - SUBJECTIVE AND OBJECTIVE BOX
NEPHROLOGY PROGRESS NOTE    Subjective: Patient seen and examined at bedside. No acute complaints. Discussed with daughter over the phone, kidney function continues to improve, continue to encourage PO intake and water intake.     [OBJECTIVE]:    Vital Signs:  T(F): , Max: 98.4 (01-15-24 @ 13:11)  HR:  (90 - 102)  BP:  (95/55 - 143/75)  BP(mean):  (65 - 100)  ABP: --  ABP(mean): --  RR:  (15 - 16)  SpO2:  (96% - 98%)  CVP(mm Hg): --  CVP(cm H2O): --      01-14 @ 07:01  -  01-15 @ 07:00  --------------------------------------------------------  IN: 1590 mL / OUT: 1715 mL / NET: -125 mL    01-15 @ 07:01  -  01-15 @ 14:28  --------------------------------------------------------  IN: 850 mL / OUT: 350 mL / NET: 500 mL      CAPILLARY BLOOD GLUCOSE          Physical Exam:  T(F): 98.4 (01-15-24 @ 13:11)  HR: 101 (01-15-24 @ 10:00)  BP: 115/56 (01-15-24 @ 10:00)  RR: 15 (01-15-24 @ 10:00)  SpO2: 97% (01-15-24 @ 10:00)  Wt(kg): --    Constitutional: Resting comfortably in bed; NAD  HEENT:  EOMI, anicteric sclera; MMM  Respiratory: CTA B/L; no W/R/R, no accessory muscle use  Cardiac: +S1/S2; RRR; no M/R/G  Gastrointestinal: soft, NT/ND; no rebound or guarding; +BS  Extremities: WWP, no clubbing or cyanosis; no peripheral edema  Dermatologic: skin warm, dry and intact; no rashes, wounds, or scars  Neuro: moving limbs, no focal deficits      Medications:  MEDICATIONS  (STANDING):  atorvastatin 10 milliGRAM(s) Oral at bedtime  bisacodyl 5 milliGRAM(s) Oral at bedtime  famotidine    Tablet 10 milliGRAM(s) Oral daily  heparin   Injectable 5000 Unit(s) SubCutaneous every 8 hours  hydrALAZINE 25 milliGRAM(s) Oral every 8 hours  isoniazid 300 milliGRAM(s) Oral daily  labetalol 200 milliGRAM(s) Oral every 8 hours  lactated ringers. 1000 milliLiter(s) (50 mL/Hr) IV Continuous <Continuous>  levoFLOXacin  Tablet 750 milliGRAM(s) Oral every 48 hours  polyethylene glycol 3350 17 Gram(s) Oral daily  predniSONE   Tablet 60 milliGRAM(s) Oral daily  pyrazinamide 1500 milliGRAM(s) Oral <User Schedule>  pyridoxine 50 milliGRAM(s) Oral daily  rifAMPin 600 milliGRAM(s) Oral daily  senna 2 Tablet(s) Oral at bedtime  sodium bicarbonate 325 milliGRAM(s) Oral two times a day    MEDICATIONS  (PRN):  acetaminophen     Tablet .. 975 milliGRAM(s) Oral every 6 hours PRN Mild Pain (1 - 3)  oxyCODONE    IR 5 milliGRAM(s) Oral every 4 hours PRN Moderate Pain (4 - 6)      Allergies:  Allergies    No Known Allergies    Intolerances        Labs:                        7.9    10.49 )-----------( 339      ( 15 Edgardo 2024 08:21 )             24.8     01-15    137  |  108  |  42<H>  ----------------------------<  106<H>  4.5   |  22  |  1.82<H>    Ca    10.2      15 Edgardo 2024 08:21  Mg     1.8     01-15    TPro  6.2  /  Alb  3.0<L>  /  TBili  0.3  /  DBili  x   /  AST  38  /  ALT  47<H>  /  AlkPhos  76  01-14    PT/INR - ( 15 Edgardo 2024 08:21 )   PT: 12.5 sec;   INR: 1.10          PTT - ( 15 Edgardo 2024 08:21 )  PTT:34.6 sec  Urinalysis Basic - ( 15 Edgardo 2024 08:21 )    Color: x / Appearance: x / SG: x / pH: x  Gluc: 106 mg/dL / Ketone: x  / Bili: x / Urobili: x   Blood: x / Protein: x / Nitrite: x   Leuk Esterase: x / RBC: x / WBC x   Sq Epi: x / Non Sq Epi: x / Bacteria: x        Radiology and other tests:  Creatinine: 1.82 mg/dL (01-15-24 @ 08:21)  Creatinine: 1.95 mg/dL (01-14-24 @ 05:30)  Creatinine: 2.23 mg/dL (01-13-24 @ 05:30)  Creatinine: 2.19 mg/dL (01-12-24 @ 07:29)  Creatinine: 2.43 mg/dL (01-11-24 @ 05:30)  Creatinine: 2.16 mg/dL (01-10-24 @ 05:30)  Creatinine: 2.05 mg/dL (01-09-24 @ 05:30)  Creatinine: 2.23 mg/dL (01-08-24 @ 05:30)  Creatinine: 2.13 mg/dL (01-07-24 @ 05:30)  Creatinine: 2.31 mg/dL (01-06-24 @ 12:00)

## 2024-01-15 NOTE — PROGRESS NOTE ADULT - SUBJECTIVE AND OBJECTIVE BOX
INTERVAL HPI/OVERNIGHT EVENTS:    Patient was seen and examined at bedside. Feels better. Less facial swelling.  Chest tube still in place     CONSTITUTIONAL:  Negative fever or chills, feels well, good appetite  EYES:  Negative  blurry vision or double vision  CARDIOVASCULAR:  Negative for chest pain or palpitations  RESPIRATORY:  Negative for cough, wheezing, or SOB   GASTROINTESTINAL:  Negative for nausea, vomiting, diarrhea, constipation, or abdominal pain  GENITOURINARY:  Negative frequency, urgency or dysuria  NEUROLOGIC:  No headache, confusion, dizziness, lightheadedness      ANTIBIOTICS/RELEVANT:    MEDICATIONS  (STANDING):  atorvastatin 10 milliGRAM(s) Oral at bedtime  bisacodyl 5 milliGRAM(s) Oral at bedtime  famotidine    Tablet 10 milliGRAM(s) Oral daily  heparin   Injectable 5000 Unit(s) SubCutaneous every 8 hours  hydrALAZINE 25 milliGRAM(s) Oral every 8 hours  isoniazid 300 milliGRAM(s) Oral daily  labetalol 200 milliGRAM(s) Oral every 8 hours  lactated ringers. 1000 milliLiter(s) (50 mL/Hr) IV Continuous <Continuous>  levoFLOXacin  Tablet 750 milliGRAM(s) Oral every 48 hours  magnesium oxide 400 milliGRAM(s) Oral once  polyethylene glycol 3350 17 Gram(s) Oral daily  predniSONE   Tablet 60 milliGRAM(s) Oral daily  pyrazinamide 1500 milliGRAM(s) Oral <User Schedule>  pyridoxine 50 milliGRAM(s) Oral daily  rifAMPin 600 milliGRAM(s) Oral daily  senna 2 Tablet(s) Oral at bedtime  sodium bicarbonate 325 milliGRAM(s) Oral two times a day    MEDICATIONS  (PRN):  acetaminophen     Tablet .. 975 milliGRAM(s) Oral every 6 hours PRN Mild Pain (1 - 3)  oxyCODONE    IR 5 milliGRAM(s) Oral every 4 hours PRN Moderate Pain (4 - 6)        Vital Signs Last 24 Hrs  T(C): 36.6 (15 Edgardo 2024 09:02), Max: 36.6 (14 Jan 2024 21:30)  T(F): 97.8 (15 Edgardo 2024 09:02), Max: 97.9 (14 Jan 2024 21:30)  HR: 101 (15 Edgardo 2024 10:00) (90 - 106)  BP: 115/56 (15 Edgardo 2024 10:00) (95/55 - 143/75)  BP(mean): 81 (15 Edgardo 2024 10:00) (65 - 100)  RR: 15 (15 Edgardo 2024 10:00) (14 - 16)  SpO2: 97% (15 Edgardo 2024 10:00) (96% - 98%)    Parameters below as of 15 Edgardo 2024 10:00  Patient On (Oxygen Delivery Method): room air        PHYSICAL EXAM:  Constitutional: non-toxic, no distress  Eyes:LIZY, EOMI  Ear/Nose/Throat: no oral lesion, no sinus tenderness on percussion	  Neck:  supple  Respiratory: CTA jose  Cardiovascular: S1S2 RRR, no murmurs  Gastrointestinal:soft, (+) BS, no HSM  Extremities:no e/e/c  Vascular: DP Pulse:	right normal; left normal      LABS:                        7.9    10.49 )-----------( 339      ( 15 Edgardo 2024 08:21 )             24.8     01-15    137  |  108  |  42<H>  ----------------------------<  106<H>  4.5   |  22  |  1.82<H>    Ca    10.2      15 Edgardo 2024 08:21  Mg     1.8     01-15    TPro  6.2  /  Alb  3.0<L>  /  TBili  0.3  /  DBili  x   /  AST  38  /  ALT  47<H>  /  AlkPhos  76  01-14    PT/INR - ( 15 Edgardo 2024 08:21 )   PT: 12.5 sec;   INR: 1.10          PTT - ( 15 Edgardo 2024 08:21 )  PTT:34.6 sec  Urinalysis Basic - ( 15 Edgardo 2024 08:21 )    Color: x / Appearance: x / SG: x / pH: x  Gluc: 106 mg/dL / Ketone: x  / Bili: x / Urobili: x   Blood: x / Protein: x / Nitrite: x   Leuk Esterase: x / RBC: x / WBC x   Sq Epi: x / Non Sq Epi: x / Bacteria: x        MICROBIOLOGY:    Culture - Acid Fast - Sputum w/Smear . (01.08.24 @ 17:17)    Specimen Source: .Sputum Sputum   Acid Fast Bacilli Smear:   No acid-fast bacilli seen by fluorochrome stain   Culture Results:   Culture is being performed.    Culture - Acid Fast - Sputum w/Smear . (01.06.24 @ 08:52)    Specimen Source: .Sputum Sputum   Acid Fast Bacilli Smear:   No acid-fast bacilli seen by fluorochrome stain   Culture Results:   Culture is being performed.      Culture - Acid Fast - Body Fluid w/Smear (01.05.24 @ 08:20)    Specimen Source: .Body Fluid right pleural fluid   Acid Fast Bacilli Smear:   No acid-fast bacilli seen by fluorochrome stain   Culture Results:   No acid-fast bacilli isolated at 1 week. ****Acid-fast cultures are held  for 6 weeks.****    Mycobacterium Tuberculosis Complex PCR and RIF Resistance (01.06.24 @ 08:52)    Mycobacterium Tuberculosis Complex Source: Sputum   Mycobacterium Tuberculosis Complex PCR: NotDetec: The Xpert MTB/RIF Assay (realtime PCR) does not differentiate between the  species of the MTB-complex (i.e., Mycobacterium tuberculosis, M. bovis,  etc.). In addition, culture must also be performed to confirm  susceptibility result and to determine if mycobacteria other than  tuberculosis complex (KEO) are present.  Due to the low prevalence of rifampin resistant TB in the [place]  (1.9%) and the implications of rifampin resistance for treatment, all  MTB-complex strains determined to be rifampin resistant must have the  presence of rifampin resistance confirmed by a reference laboratory.  A positive test does not necessarily indicate the presence of viable  organisms.  This assay is FDA cleared for sputum only.   Mycobacterium Tuberculosis Complex PCR-Interp: SeeComment This result does not rule out the presence of MTB in the sample due to  low number of MTB.Results must be confirmed by culture to determine if  mycobacteria other than tuberculosis complex (KEO) are present.   Rifampin PCR: Not Indicated    Mycobacterium Tuberculosis Complex PCR and RIF Resistance (01.05.24 @ 08:44)    Mycobacterium Tuberculosis Complex Source: RT PLEURA This specimen type is not FDA approved for testing. The clinical  significance of result should be considered in conjunction with the  overall clinical presentation of the patient. Results are not intended to  be used as the sole means for clinical diagnosis or patient management  decisions.   Mycobacterium Tuberculosis Complex PCR: Detected: The Xpert MTB/RIF Assay (realtime PCR) does not differentiate between the  species of the MTB-complex (i.e., Mycobacterium tuberculosis, M. bovis,  etc.). In addition, culture must also be performed to confirm  susceptibility result and to determine if mycobacteria other than  tuberculosis complex (KEO) are present.  Due to the low prevalence of rifampin resistant TB in the [place]  (1.9%) and the implications of rifampin resistance for treatment, all  MTB-complex strains determined to be rifampin resistant must have the  presence of rifampin resistance confirmed by a reference laboratory.  A positive test does not necessarily indicate the presence of viable  organisms.  This assay is FDA cleared for sputum only.   Rifampin PCR: NotDetec   Rifampin PCR Interp: SeeComment Mutation for Rifampin resistance absent. Results must be confirmed by  culture based susceptibility testing.      RADIOLOGY & ADDITIONAL STUDIES:

## 2024-01-15 NOTE — PROGRESS NOTE ADULT - ASSESSMENT
73 year old M, Cantonese speaking, never a smoker, with PMHx of HTN, HLD, who is referred by Dr. Edward Jensen for an initial evaluation of pleural effusion. Patient reports right lower quadrant pain since Aug 2023, along with poor appetite, unintentionally weight loss around 20lbs, fatigue, progressively worse hoarseness and dyspnea. Pt underwent R VATS, pleural biopsy decortication, on 1/5 with Dr. Kaiser. Intraop path revealed necrotizing granuloma. ID consulted for TB workup and management. He arrived to [facility] post op with 2 CTs in place, on suction, with air leaks. POD2 AFB samples sent and obtained and he was started on antibiotic regimen per ID. POD3 AFB came back negative but on POD4 OR cultures came back positive for TB. One CT was removed with stable CXR. POD5 second CT removed. POD6 new left pneumothorax and increase in subcutaneous emphysema noted, left chest blowhole and pigtail were placed. Reported vision floaters so ethambutol discontinued and transitioned to Levofloxcin. POD 7, renal concerned that rifampin could be contributing to NARA, but per ID, rifampin is a crucial medication in the management of his TB, and give that Cr was elevated before pt was started on rifampin, suspicion is low for rifampin causing AIN from ID perspective. Will keep pt on rifampin for the time being and continue to monitor. On POD 7, there was increase in subQ emphysema down to scrotum. POD 8, pending CT chest, no SubQ emphysema in scrotum appreciated Maintaining pigtail to suction and wound vac to blow hole. POD 9, CT chest completed showing extensive subq emphysema and R small apical PTX. POD 10 subq emphysema greatly improved. CXR shows pigtail likely dislodged outside of chest wall. Plan for waterseal at midnight.     Plan:    Neurovascular:   -Pain well controlled with current regimen. PRN's: oxy, tylenol    Cardiovascular:   -Hemodynamically stable.   -Monitor: BP, HR, tele  -HTN    -c/w labetolol 200 Q 8 h    -c/w hydralazine 25mg q8h  -HLD    -c/w lipitor    Respiratory:   -Oxygenating well on room air  -Encourage continued use of IS 10x/hr and frequent ambulation  -CXR: pigtail likely dislodged outside of chest wall    -plan for waterseal at midnight tonight  -Subcutaneous emphysema    -wound vac to suction over blowhole, per Dr. Lopez-can be changed Q 5 days (next on 1/17)    GI:  -GI PPX: pepcid  -PO Diet  -Bowel Regimen: dulcolax, miralax, senna     Renal / :  -Continue to monitor renal function: BUN/Cr 42/1.82  -Cr improving    -appreciate renal reccs    -c/w LR @ 50cc/hr    -c/w bicarb  BID    -c/w prednisone 60mg IV BID  -Monitor I/O's daily     Endocrine:    -No hx of DM or thyroid dx  -A1c: 5.2  -TSH: .92    Hematologic:  -CBC: H/H- 7.9/25  -Coagulation Panel.    ID:  -Temperature: 36  -TB    -c/w TB abx therapy (isoniazid, rifampin, pyrazinamide)  -CBC: WBC- 10  -Continue to observe for SIRS/Sepsis Syndrome.    Prophylaxis:  -DVT prophylaxis with 5000 SubQ Heparin q8h.  -Continue with SCD's b/l while patient is at rest     Disposition:  -Discharge home once patient is medically ready

## 2024-01-15 NOTE — PROGRESS NOTE ADULT - ASSESSMENT
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 Hospital course c/b NARA non oliguric initially thought to be prerenal/ATN with partial response to IVF down to 2.1 ranges. However, gradual uptrend noted starting on 1/8  (2.23-2.0-2.1) w/ highest SCr noted on 1/11 at 2.43 in addition to peripheral Eosinophilia 13.3% on 1/10 suggesting additional renal insult such as AIN drug induced     1. Non oliguric NARA sec to prerenal/ATN vs possible AIN - initial renal insult attributed to ATN/pre renal etiology which responded to IVF trial w/ SCr down to 2.1 ranges  However gradual uptrend noted on renal function up to 2.43 ranges in the setting of normal renal US, stable hemodynamics, bland UA and peripheral Eosinophilia suggesting acute interstitial nephritis (drug induced) possibly related to rifampin use (started on 1/6)  Empirically started on prednisone 60mg because of these findings on 1/11 w/ most recent SCr continues to improve, now 1.82  Discussed w/ ID and since Rifampin is a crucial anti-tb medication, stopping this medication will lead to poor clinical outcomes, it was decided to c/w this agent in addition to PO steroids.   C/w prednisone 60mg PO daily (day #5)   C/w strict I/O for monitoring  c/w PUD ppx w/ H2 antagonist   Adjust and dose all medications to current GFR  Work up for other potential disease mimics unremarkable   Trend BMP for additional renal function monitoring   Repeat UA with lytes yesterday noted with improvement, hopefully acute process improving    2. Hx of HTN -  c/w hydralazine 25mg TID and Labetalol 100mg PO BID    3. Hypercalcemia w/ suppressed PTH - in the setting of on going granulomatous disease (rest of hyperCa+ work up unremarkable)  Maintain adequate volume status  Maintain low calcium intake (no more than 400 mg/day) and low oxalate diet   Avoid dietary vitamin D supplements  Expect gradual correction of Ca+ levels as treatment of the underlying disease continues    4. CKD Stage III - not yet at baseline  c/w Bicarb 130mg PO TID   C/w to avoid NSAIDs and other nephrotoxins     Discussed in detail with primary team, will continue to monitor closely with you

## 2024-01-15 NOTE — PROGRESS NOTE ADULT - ASSESSMENT
IMPRESSION:  73 year old M, Cantonese speaking, never a smoker, with PMHx of HTN, HLD, who is referred by Dr. Edward Jensen for an initial evaluation of pleural effusion. Patient reports right lower quadrant pain since Aug 2023, along with poor appetite, unintentionally weight loss around 20lbs, fatigue, progressively worse hoarseness and dyspnea. He had Shingles right arm in Oct 2023, and was subsequently diagnosed with vocal cord paralysis. No personal history of TB infection. Pt underwent R VATS, pleural biopsy decortication, on 1/5 with Dr. Kaiser. Intraop path revealed necrotizing granuloma. ID was consulted for w/u of necrotizing granuloma, and concern for active TB.  Patient is having vision changes and seeing floaters now, which is known side effect of Ethambutol.    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  Steroids are not completely contraindicated with TB and in fact in some cases of TB (TB meningitis and TB pericarditis), steroids are indicated.  I explained that rifampin is the most important and efficacious medication for TB stopping it will lead to worse outcomes.  For now ok to continue rifampin and steroids while work up is underway    Recommend:  1.  Continue Rifampin 600 mg PO daily  2.  Continue  mg PO daily  3.  Continu Levaquin 750mg PO q 48 hours  4.  Continue pyrazinamide 1500 mg PO three times a week (M, W, F)  5.  Continue vitamin B6 50 mg PO daily  6.  Continue airborne precautions   7.  Follow up AFB sputum cultures   8. Reported to [facility] 1/8/24. They will make home assessment and clear patient for discharge.  9. Will discuss with hospital epidemiology on 1/16 re: removing isolation    ID team 1 will follow  IMPRESSION:  73 year old M, Cantonese speaking, never a smoker, with PMHx of HTN, HLD, who is referred by Dr. Edward Jensen for an initial evaluation of pleural effusion. Patient reports right lower quadrant pain since Aug 2023, along with poor appetite, unintentionally weight loss around 20lbs, fatigue, progressively worse hoarseness and dyspnea. He had Shingles right arm in Oct 2023, and was subsequently diagnosed with vocal cord paralysis. No personal history of TB infection. Pt underwent R VATS, pleural biopsy decortication, on 1/5 with Dr. Kaiser. Intraop path revealed necrotizing granuloma. ID was consulted for w/u of necrotizing granuloma, and concern for active TB.  Patient is having vision changes and seeing floaters now, which is known side effect of Ethambutol.    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  Steroids are not completely contraindicated with TB and in fact in some cases of TB (TB meningitis and TB pericarditis), steroids are indicated.  I explained that rifampin is the most important and efficacious medication for TB stopping it will lead to worse outcomes.  For now ok to continue rifampin and steroids while work up is underway    Recommend:  1.  Continue Rifampin 600 mg PO daily  2.  Continue  mg PO daily  3.  Continu Levaquin 750mg PO q 48 hours  4.  Continue pyrazinamide 1500 mg PO three times a week (M, W, F)  5.  Continue vitamin B6 50 mg PO daily  6.  Continue airborne precautions   7.  Follow up AFB sputum cultures   8. Reported to [facility] 1/8/24. They will make home assessment and clear patient for discharge.  9. Will discuss with hospital epidemiology on 1/16 re: removing isolation    ID team 1 will follow  IMPRESSION:  73 year old M, Cantonese speaking, never a smoker, with PMHx of HTN, HLD, who is referred by Dr. Edward Jensen for an initial evaluation of pleural effusion. Patient reports right lower quadrant pain since Aug 2023, along with poor appetite, unintentionally weight loss around 20lbs, fatigue, progressively worse hoarseness and dyspnea. He had Shingles right arm in Oct 2023, and was subsequently diagnosed with vocal cord paralysis. No personal history of TB infection. Pt underwent R VATS, pleural biopsy decortication, on 1/5 with Dr. Kaiser. Intraop path revealed necrotizing granuloma. ID was consulted for w/u of necrotizing granuloma, and concern for active TB.  Patient is having vision changes and seeing floaters now, which is known side effect of Ethambutol.    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  Steroids are not completely contraindicated with TB and in fact in some cases of TB (TB meningitis and TB pericarditis), steroids are indicated.  I explained that rifampin is the most important and efficacious medication for TB stopping it will lead to worse outcomes.  For now ok to continue rifampin and steroids while work up is underway    Recommend:  1.  Continue Rifampin 600 mg PO daily  2.  Continue  mg PO daily  3.  Continu Levaquin 750mg PO q 48 hours  4.  Continue pyrazinamide 1500 mg PO three times a week (M, W, F)  5.  Continue vitamin B6 50 mg PO daily  6.  Continue airborne precautions   7.  Follow up AFB sputum cultures   8. Reported to [facility] 1/8/24. They will make home assessment and clear patient for discharge.  9. Will discuss with hospital epidemiology on 1/16 re: removing isolation    ID team 1 will follow

## 2024-01-15 NOTE — PROGRESS NOTE ADULT - SUBJECTIVE AND OBJECTIVE BOX
Patient discussed on morning rounds with Dr. Lopez    OPERATION & DATE: 1/5 R VATS, RA, pleural biopsy and decortication    SUBJECTIVE ASSESSMENT: resting comfortably in bed. SubQ emphysema greatly improved. Denying CP or SOB today. Overall, feels much better today.     VITAL SIGNS:  Vital Signs Last 24 Hrs  T(C): 36.9 (15 Edgardo 2024 13:11), Max: 36.9 (15 Edgardo 2024 13:11)  T(F): 98.4 (15 Edgardo 2024 13:11), Max: 98.4 (15 Edgardo 2024 13:11)  HR: 101 (15 Edgardo 2024 10:00) (90 - 106)  BP: 115/56 (15 Edgardo 2024 10:00) (95/55 - 143/75)  BP(mean): 81 (15 Edgardo 2024 10:00) (65 - 100)  RR: 15 (15 Edgardo 2024 10:00) (14 - 16)  SpO2: 97% (15 Edgardo 2024 10:00) (96% - 98%)    Parameters below as of 15 Edgardo 2024 10:00  Patient On (Oxygen Delivery Method): room air      I&O's Detail    14 Jan 2024 07:01  -  15 Edgardo 2024 07:00  --------------------------------------------------------  IN:    Lactated Ringers: 950 mL    Oral Fluid: 640 mL  Total IN: 1590 mL    OUT:    Chest Tube (mL): 15 mL    VAC (Vacuum Assisted Closure) System (mL): 0 mL    Voided (mL): 1700 mL  Total OUT: 1715 mL    Total NET: -125 mL      15 Edgardo 2024 07:01  -  15 Edgardo 2024 14:08  --------------------------------------------------------  IN:    Lactated Ringers: 350 mL    Oral Fluid: 500 mL  Total IN: 850 mL    OUT:    Voided (mL): 350 mL  Total OUT: 350 mL    Total NET: 500 mL        CHEST TUBE:  pigtail catheter in place to suction  DEON DRAIN:  none  EPICARDIAL WIRES: none  STITCHES: none  STAPLES: none  TRUJILLO: none  CENTRAL LINE: none  MIDLINE/PICC: none  WOUND VAC: none    PHYSICAL EXAM:  General: resting comfortably in bed in NAD  Neurological: AOx3. Motor skills grossly intact  Cardiovascular: Normal S1/S2. Regular rate/rhythm. No murmurs  Respiratory: Lungs CTA bilaterally. No wheezing or rales  Gastrointestinal: +BS in all 4 quadrants. Non-distended. Soft. Non-tender  Extremities: Strength 5/5 b/l upper/lower extremities. Sensation grossly intact upper/lower extremities. No edema. No calf tenderness.  Vascular: Radial 2+bilaterally, DP 2+ b/l  Incision Sites: VATS incisions clean, dry, intact      LABS:                        7.9    10.49 )-----------( 339      ( 15 Edgardo 2024 08:21 )             24.8     PT/INR - ( 15 Edgardo 2024 08:21 )   PT: 12.5 sec;   INR: 1.10          PTT - ( 15 Edgardo 2024 08:21 )  PTT:34.6 sec  01-15    137  |  108  |  42<H>  ----------------------------<  106<H>  4.5   |  22  |  1.82<H>    Ca    10.2      15 Edgardo 2024 08:21  Mg     1.8     01-15    TPro  6.2  /  Alb  3.0<L>  /  TBili  0.3  /  DBili  x   /  AST  38  /  ALT  47<H>  /  AlkPhos  76  01-14    Urinalysis Basic - ( 15 Edgardo 2024 08:21 )    Color: x / Appearance: x / SG: x / pH: x  Gluc: 106 mg/dL / Ketone: x  / Bili: x / Urobili: x   Blood: x / Protein: x / Nitrite: x   Leuk Esterase: x / RBC: x / WBC x   Sq Epi: x / Non Sq Epi: x / Bacteria: x      MEDICATIONS  (STANDING):  atorvastatin 10 milliGRAM(s) Oral at bedtime  bisacodyl 5 milliGRAM(s) Oral at bedtime  famotidine    Tablet 10 milliGRAM(s) Oral daily  heparin   Injectable 5000 Unit(s) SubCutaneous every 8 hours  hydrALAZINE 25 milliGRAM(s) Oral every 8 hours  isoniazid 300 milliGRAM(s) Oral daily  labetalol 200 milliGRAM(s) Oral every 8 hours  lactated ringers. 1000 milliLiter(s) (50 mL/Hr) IV Continuous <Continuous>  levoFLOXacin  Tablet 750 milliGRAM(s) Oral every 48 hours  polyethylene glycol 3350 17 Gram(s) Oral daily  predniSONE   Tablet 60 milliGRAM(s) Oral daily  pyrazinamide 1500 milliGRAM(s) Oral <User Schedule>  pyridoxine 50 milliGRAM(s) Oral daily  rifAMPin 600 milliGRAM(s) Oral daily  senna 2 Tablet(s) Oral at bedtime  sodium bicarbonate 325 milliGRAM(s) Oral two times a day    MEDICATIONS  (PRN):  acetaminophen     Tablet .. 975 milliGRAM(s) Oral every 6 hours PRN Mild Pain (1 - 3)  oxyCODONE    IR 5 milliGRAM(s) Oral every 4 hours PRN Moderate Pain (4 - 6)    RADIOLOGY & ADDITIONAL TESTS:

## 2024-01-16 ENCOUNTER — TRANSCRIPTION ENCOUNTER (OUTPATIENT)
Age: 74
End: 2024-01-16

## 2024-01-16 PROBLEM — M31.30 NECROTIZING GRANULOMATOUS INFLAMMATION OF LUNG: Status: ACTIVE | Noted: 2024-01-16

## 2024-01-16 LAB
ALBUMIN SERPL ELPH-MCNC: 2.6 G/DL — LOW (ref 3.3–5)
ALP SERPL-CCNC: 74 U/L — SIGNIFICANT CHANGE UP (ref 40–120)
ALT FLD-CCNC: 33 U/L — SIGNIFICANT CHANGE UP (ref 10–45)
ANION GAP SERPL CALC-SCNC: 9 MMOL/L — SIGNIFICANT CHANGE UP (ref 5–17)
AST SERPL-CCNC: 26 U/L — SIGNIFICANT CHANGE UP (ref 10–40)
BILIRUB SERPL-MCNC: 0.2 MG/DL — SIGNIFICANT CHANGE UP (ref 0.2–1.2)
BLD GP AB SCN SERPL QL: NEGATIVE — SIGNIFICANT CHANGE UP
BLD GP AB SCN SERPL QL: NEGATIVE — SIGNIFICANT CHANGE UP
BUN SERPL-MCNC: 39 MG/DL — HIGH (ref 7–23)
CALCIUM SERPL-MCNC: 9.3 MG/DL — SIGNIFICANT CHANGE UP (ref 8.4–10.5)
CHLORIDE SERPL-SCNC: 110 MMOL/L — HIGH (ref 96–108)
CO2 SERPL-SCNC: 20 MMOL/L — LOW (ref 22–31)
CREAT SERPL-MCNC: 1.86 MG/DL — HIGH (ref 0.5–1.3)
EGFR: 38 ML/MIN/1.73M2 — LOW
GLUCOSE SERPL-MCNC: 91 MG/DL — SIGNIFICANT CHANGE UP (ref 70–99)
HCT VFR BLD CALC: 21.9 % — LOW (ref 39–50)
HCT VFR BLD CALC: 21.9 % — LOW (ref 39–50)
HCT VFR BLD CALC: 23.5 % — LOW (ref 39–50)
HGB BLD-MCNC: 7.4 G/DL — LOW (ref 13–17)
HGB BLD-MCNC: 7.4 G/DL — LOW (ref 13–17)
HGB BLD-MCNC: 7.5 G/DL — LOW (ref 13–17)
MAGNESIUM SERPL-MCNC: 1.7 MG/DL — SIGNIFICANT CHANGE UP (ref 1.6–2.6)
MCHC RBC-ENTMCNC: 30.1 PG — SIGNIFICANT CHANGE UP (ref 27–34)
MCHC RBC-ENTMCNC: 30.8 PG — SIGNIFICANT CHANGE UP (ref 27–34)
MCHC RBC-ENTMCNC: 30.8 PG — SIGNIFICANT CHANGE UP (ref 27–34)
MCHC RBC-ENTMCNC: 31.9 GM/DL — LOW (ref 32–36)
MCHC RBC-ENTMCNC: 33.8 GM/DL — SIGNIFICANT CHANGE UP (ref 32–36)
MCHC RBC-ENTMCNC: 33.8 GM/DL — SIGNIFICANT CHANGE UP (ref 32–36)
MCV RBC AUTO: 91.3 FL — SIGNIFICANT CHANGE UP (ref 80–100)
MCV RBC AUTO: 91.3 FL — SIGNIFICANT CHANGE UP (ref 80–100)
MCV RBC AUTO: 94.4 FL — SIGNIFICANT CHANGE UP (ref 80–100)
NRBC # BLD: 0 /100 WBCS — SIGNIFICANT CHANGE UP (ref 0–0)
PLATELET # BLD AUTO: 312 K/UL — SIGNIFICANT CHANGE UP (ref 150–400)
PLATELET # BLD AUTO: 326 K/UL — SIGNIFICANT CHANGE UP (ref 150–400)
PLATELET # BLD AUTO: 326 K/UL — SIGNIFICANT CHANGE UP (ref 150–400)
POTASSIUM SERPL-MCNC: 4 MMOL/L — SIGNIFICANT CHANGE UP (ref 3.5–5.3)
POTASSIUM SERPL-SCNC: 4 MMOL/L — SIGNIFICANT CHANGE UP (ref 3.5–5.3)
PROT SERPL-MCNC: 5.6 G/DL — LOW (ref 6–8.3)
RBC # BLD: 2.4 M/UL — LOW (ref 4.2–5.8)
RBC # BLD: 2.4 M/UL — LOW (ref 4.2–5.8)
RBC # BLD: 2.49 M/UL — LOW (ref 4.2–5.8)
RBC # FLD: 18.5 % — HIGH (ref 10.3–14.5)
RBC # FLD: 18.6 % — HIGH (ref 10.3–14.5)
RBC # FLD: 18.6 % — HIGH (ref 10.3–14.5)
RH IG SCN BLD-IMP: POSITIVE — SIGNIFICANT CHANGE UP
RH IG SCN BLD-IMP: POSITIVE — SIGNIFICANT CHANGE UP
SODIUM SERPL-SCNC: 139 MMOL/L — SIGNIFICANT CHANGE UP (ref 135–145)
WBC # BLD: 9.75 K/UL — SIGNIFICANT CHANGE UP (ref 3.8–10.5)
WBC # BLD: 9.75 K/UL — SIGNIFICANT CHANGE UP (ref 3.8–10.5)
WBC # BLD: 9.88 K/UL — SIGNIFICANT CHANGE UP (ref 3.8–10.5)
WBC # FLD AUTO: 9.75 K/UL — SIGNIFICANT CHANGE UP (ref 3.8–10.5)
WBC # FLD AUTO: 9.75 K/UL — SIGNIFICANT CHANGE UP (ref 3.8–10.5)
WBC # FLD AUTO: 9.88 K/UL — SIGNIFICANT CHANGE UP (ref 3.8–10.5)

## 2024-01-16 PROCEDURE — 99232 SBSQ HOSP IP/OBS MODERATE 35: CPT

## 2024-01-16 PROCEDURE — 71045 X-RAY EXAM CHEST 1 VIEW: CPT | Mod: 26

## 2024-01-16 RX ORDER — MAGNESIUM OXIDE 400 MG ORAL TABLET 241.3 MG
400 TABLET ORAL ONCE
Refills: 0 | Status: COMPLETED | OUTPATIENT
Start: 2024-01-16 | End: 2024-01-16

## 2024-01-16 RX ORDER — HYDROMORPHONE HYDROCHLORIDE 2 MG/ML
0.5 INJECTION INTRAMUSCULAR; INTRAVENOUS; SUBCUTANEOUS ONCE
Refills: 0 | Status: DISCONTINUED | OUTPATIENT
Start: 2024-01-16 | End: 2024-01-16

## 2024-01-16 RX ADMIN — OXYCODONE HYDROCHLORIDE 5 MILLIGRAM(S): 5 TABLET ORAL at 22:00

## 2024-01-16 RX ADMIN — Medication 60 MILLIGRAM(S): at 05:36

## 2024-01-16 RX ADMIN — HEPARIN SODIUM 5000 UNIT(S): 5000 INJECTION INTRAVENOUS; SUBCUTANEOUS at 22:01

## 2024-01-16 RX ADMIN — HYDROMORPHONE HYDROCHLORIDE 0.5 MILLIGRAM(S): 2 INJECTION INTRAMUSCULAR; INTRAVENOUS; SUBCUTANEOUS at 14:00

## 2024-01-16 RX ADMIN — OXYCODONE HYDROCHLORIDE 5 MILLIGRAM(S): 5 TABLET ORAL at 01:27

## 2024-01-16 RX ADMIN — Medication 25 MILLIGRAM(S): at 07:36

## 2024-01-16 RX ADMIN — Medication 25 MILLIGRAM(S): at 00:06

## 2024-01-16 RX ADMIN — Medication 200 MILLIGRAM(S): at 13:23

## 2024-01-16 RX ADMIN — Medication 975 MILLIGRAM(S): at 02:15

## 2024-01-16 RX ADMIN — FAMOTIDINE 10 MILLIGRAM(S): 10 INJECTION INTRAVENOUS at 13:23

## 2024-01-16 RX ADMIN — Medication 300 MILLIGRAM(S): at 13:22

## 2024-01-16 RX ADMIN — HEPARIN SODIUM 5000 UNIT(S): 5000 INJECTION INTRAVENOUS; SUBCUTANEOUS at 13:51

## 2024-01-16 RX ADMIN — HEPARIN SODIUM 5000 UNIT(S): 5000 INJECTION INTRAVENOUS; SUBCUTANEOUS at 05:36

## 2024-01-16 RX ADMIN — HYDROMORPHONE HYDROCHLORIDE 0.5 MILLIGRAM(S): 2 INJECTION INTRAMUSCULAR; INTRAVENOUS; SUBCUTANEOUS at 14:15

## 2024-01-16 RX ADMIN — Medication 325 MILLIGRAM(S): at 05:37

## 2024-01-16 RX ADMIN — SENNA PLUS 2 TABLET(S): 8.6 TABLET ORAL at 22:00

## 2024-01-16 RX ADMIN — Medication 5 MILLIGRAM(S): at 22:00

## 2024-01-16 RX ADMIN — Medication 325 MILLIGRAM(S): at 17:36

## 2024-01-16 RX ADMIN — Medication 200 MILLIGRAM(S): at 05:36

## 2024-01-16 RX ADMIN — Medication 975 MILLIGRAM(S): at 01:27

## 2024-01-16 RX ADMIN — OXYCODONE HYDROCHLORIDE 5 MILLIGRAM(S): 5 TABLET ORAL at 02:15

## 2024-01-16 RX ADMIN — Medication 200 MILLIGRAM(S): at 22:00

## 2024-01-16 RX ADMIN — MAGNESIUM OXIDE 400 MG ORAL TABLET 400 MILLIGRAM(S): 241.3 TABLET ORAL at 08:04

## 2024-01-16 RX ADMIN — OXYCODONE HYDROCHLORIDE 5 MILLIGRAM(S): 5 TABLET ORAL at 22:45

## 2024-01-16 RX ADMIN — ATORVASTATIN CALCIUM 10 MILLIGRAM(S): 80 TABLET, FILM COATED ORAL at 22:00

## 2024-01-16 RX ADMIN — Medication 25 MILLIGRAM(S): at 16:17

## 2024-01-16 RX ADMIN — Medication 50 MILLIGRAM(S): at 13:23

## 2024-01-16 NOTE — PROGRESS NOTE ADULT - SUBJECTIVE AND OBJECTIVE BOX
Seen and evaluated at bedside, no overnight events reported by primary team. No new complaints reported by the pt.  Family member acted as  over the phone    UOP: 1400ml    REVIEW OF SYSTEMS  --------------------------------------------------------------------------------  Gen: No fevers/chills, weakness  Skin: No rashes  Head/Eyes/Ears/Mouth: No headache; No hearing changes, mucous discharge, vision changes  Respiratory: No dyspnea, cough, wheezing  CV: No chest pain, palpitations  GI: No abdominal pain, diarrhea, constipation, nausea, vomiting, melena, hematochezia  : No increased frequency, dysuria, hematuria  MSK: No joint pain/swelling; no back pain; no edema  Neuro: No dizziness/lightheadedness, weakness, seizures, numbness  Heme: No easy bruising or bleeding      Standing Inpatient Medications  atorvastatin 10 milliGRAM(s) Oral at bedtime  bisacodyl 5 milliGRAM(s) Oral at bedtime  famotidine    Tablet 10 milliGRAM(s) Oral daily  heparin   Injectable 5000 Unit(s) SubCutaneous every 8 hours  hydrALAZINE 25 milliGRAM(s) Oral every 8 hours  isoniazid 300 milliGRAM(s) Oral daily  labetalol 200 milliGRAM(s) Oral every 8 hours  lactated ringers. 1000 milliLiter(s) IV Continuous <Continuous>  levoFLOXacin  Tablet 750 milliGRAM(s) Oral every 48 hours  polyethylene glycol 3350 17 Gram(s) Oral daily  pyrazinamide 1500 milliGRAM(s) Oral <User Schedule>  pyridoxine 50 milliGRAM(s) Oral daily  rifAMPin 600 milliGRAM(s) Oral daily  senna 2 Tablet(s) Oral at bedtime  sodium bicarbonate 325 milliGRAM(s) Oral two times a day    PRN Inpatient Medications  acetaminophen     Tablet .. 975 milliGRAM(s) Oral every 6 hours PRN  oxyCODONE    IR 5 milliGRAM(s) Oral every 4 hours PRN        VITALS/PHYSICAL EXAM  --------------------------------------------------------------------------------  T(C): 36.6 (01-16-24 @ 13:40), Max: 36.6 (01-15-24 @ 17:04)  HR: 106 (01-16-24 @ 13:20) (90 - 106)  BP: 117/56 (01-16-24 @ 13:20) (95/62 - 143/73)  RR: 16 (01-16-24 @ 13:20) (14 - 17)  SpO2: 98% (01-16-24 @ 13:20) (97% - 98%)  Wt(kg): --        01-15-24 @ 07:01  -  01-16-24 @ 07:00  --------------------------------------------------------  IN: 2300 mL / OUT: 1418 mL / NET: 882 mL      Physical Exam:  GENERAL: NAD, lying in bed   NECK: supple, No JVD  CHEST/LUNG: Clear to auscultation bilaterally  HEART: normal S1S2, RRR  ABDOMEN: Soft, Nontender   EXTREMITIES: No clubbing, cyanosis, or edema   NEUROLOGY: AAO x3, no focal neurological deficit      LABS/STUDIES  --------------------------------------------------------------------------------              7.4    9.75  >-----------<  326      [01-16-24 @ 15:54]              21.9     139  |  110  |  39  ----------------------------<  91      [01-16-24 @ 05:30]  4.0   |  20  |  1.86        Ca     9.3     [01-16-24 @ 05:30]      Mg     1.7     [01-16-24 @ 05:30]    TPro  5.6  /  Alb  2.6  /  TBili  0.2  /  DBili  x   /  AST  26  /  ALT  33  /  AlkPhos  74  [01-16-24 @ 05:30]    PT/INR: PT 12.5 , INR 1.10       [01-15-24 @ 08:21]  PTT: 34.6       [01-15-24 @ 08:21]      Creatinine Trend:  SCr 1.86 [01-16 @ 05:30]  SCr 1.82 [01-15 @ 08:21]  SCr 1.95 [01-14 @ 05:30]  SCr 2.23 [01-13 @ 05:30]  SCr 2.19 [01-12 @ 07:29]    Urinalysis - [01-16-24 @ 05:30]      Color  / Appearance  / SG  / pH       Gluc 91 / Ketone   / Bili  / Urobili        Blood  / Protein  / Leuk Est  / Nitrite       RBC  / WBC  / Hyaline  / Gran  / Sq Epi  / Non Sq Epi  / Bacteria     Urine Creatinine 100      [01-13-24 @ 11:30]  Urine Protein 14      [01-13-24 @ 11:30]  Urine Sodium 56      [01-13-24 @ 11:30]  Urine Urea Nitrogen 846      [01-13-24 @ 11:30]  Urine Potassium 46      [01-13-24 @ 11:30]  Urine Osmolality 539      [01-13-24 @ 11:30]    PTH -- (Ca 10.8)      [01-09-24 @ 05:30]   6  Vitamin D (25OH) 33.2      [01-06-24 @ 12:00]  TSH 0.928      [01-08-24 @ 05:30]    HCV 0.08, Nonreact      [01-06-24 @ 06:08]    ANCA: cANCA Negative, pANCA Negative, atypical ANCA Negative      [01-07-24 @ 15:05]  Free Light Chains: kappa 5.78, lambda 6.32, ratio = 0.91      [01-09 @ 05:30]  Immunofixation Serum:   No Monoclonal Band Identified      Reference Range: None Detected      [01-09-24 @ 05:30]  SPEP Interpretation: Mild Polyclonal Gammopathy      [01-09-24 @ 05:30]  Immunofixation Urine: No Monoclonal Band Identified      Reference Range: None Detected      [01-08-24 @ 17:39]  UPEP Interpretation: Mild Selective (Glomerular) Proteinuria      [01-08-24 @ 17:39]

## 2024-01-16 NOTE — PROGRESS NOTE ADULT - ASSESSMENT
73 year old M, Cantonese speaking, never a smoker, with PMHx of HTN, HLD, CKD3,BPH,  Gout, hx Shingles right arm ( 10/2023), hx RLQ pain since ( 8/2023) along with poor appetite, unintentional weight loss around 20 lbs, and progressively worse hoarseness and dyspnea. recent EGD and colonoscopy unremarkable. Pt was referred by Heme Onc Dr. Edward Jensen for an initial evaluation of right pleural effusion. Pt underwent 	R VATS RA Pleural biopsy and decortication (Frozen + Necrotizing granuloma) 1/5/24- pleural AFB positive, sputum AFB smear negative x 3 ( culture pending ) on RIBE + B 6 - ethambutol dc due to floaters, started on levaquin, - post CT removal, right -pneumothorax-  pig tail place 1/11-, significant subq emphysemia - blow hole with wound vac,  pod # 11    - 9LA for HD monitoring   - pig tail , blow hole with wound vac- care by CTS appreciated.  saturating well on room air-decrease amount of subq emphysema.  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  "1.  Continue Rifampin 600 mg PO daily  2.  Continue  mg PO daily  3.  Stop Ethambutol 1200 mg PO three times a week (M, W, F). Patient is having vision changes and seeing floaters now, which is known side effect of Ethambutol.  4.  Start Levaquin 750mg PO q 48 hours-   5.  Continue pyrazinamide 1500 mg PO three times a week (M, W, F)  6.  Continue vitamin B6 50 mg PO daily"    - ID contacted  [facility]-  AARON called and discussed with wife 1/10/24- ( not contagious to others) to follow up with AARON    - Renal f/u appreciated, for NARA on CKD3 (baseline Cr 1.8) : S Cr baseline 1.91( 12/15/23)-> 2.71 ( 1/5)-> 2.52( 1/6)-> 2.13( 1/7)-> 2.23( 1/8)-> 2.05(1/9) , UA noted w. mild proteinuria--> 1.9 on 1/14--> 1.8 1/15-  IVF and tolerating oral   - ANCA negative  - renal concern AIN started on prednisone 60 mg # 6- given IVF as well, renal function continue to improve.  - fu renal for steroids dosing  - on sodium bicarbs 325 mg po bid   - adequate hydration   - for follow up to refer to Dr. Elif Garcia ( [phone] ) Dr. Garcia has office hours in both [address] and [place].     - Hypercalcemia: could be related to MTB. - avoid calcium supplement and vitamin D , adequate hydration.  -  trend BMP for S Cr and avoid nephrotoxic meds/NSAIDS/iv contrast dye     - pain control   - acetaminophen     Tablet .. 975 milliGRAM(s) Oral every 6 hours PRN  - oxyCODONE    IR 5 milliGRAM(s) Oral every 6 hours PRN  - bowel regimen:   - bisacodyl 5 milliGRAM(s) Oral at bedtime  - polyethylene glycol 3350 17 Gram(s) Oral daily- to restart  - senna 2 Tablet(s) Oral at bedtime-to restart.    - HTN: and tachycardia.- much improved.   - currently on Hydralazine 25 mg q 8 hourly  - labetalol 200 mg tid.      - HLD: atorvastatin 10 milliGRAM(s) Oral at bedtime  - GI ppx: pantoprazole    Tablet 40 milliGRAM(s) Oral before breakfast  - DVT ppx: heparin   Injectable 5000 Unit(s) SubCutaneous every 8 hours    Contacts:   Referring Heme Onc Dr Joanne Jensen     POC as d/w family and CTS PA, wife, ID.              73 year old M, Cantonese speaking, never a smoker, with PMHx of HTN, HLD, CKD3,BPH,  Gout, hx Shingles right arm ( 10/2023), hx RLQ pain since ( 8/2023) along with poor appetite, unintentional weight loss around 20 lbs, and progressively worse hoarseness and dyspnea. recent EGD and colonoscopy unremarkable. Pt was referred by Heme Onc Dr. Edward Jensen for an initial evaluation of right pleural effusion. Pt underwent 	R VATS RA Pleural biopsy and decortication (Frozen + Necrotizing granuloma) 1/5/24- pleural AFB positive, sputum AFB smear negative x 3 ( culture pending ) on RIBE + B 6 - ethambutol dc due to floaters, started on levaquin, - post CT removal, right -pneumothorax-  pig tail place 1/11-, significant subq emphysemia - blow hole with wound vac,  pod # 11    - 9LA for HD monitoring   - pig tail , blow hole with wound vac- care by CTS appreciated.  saturating well on room air-decrease amount of subq emphysema.  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  "1.  Continue Rifampin 600 mg PO daily  2.  Continue  mg PO daily  3.  Stop Ethambutol 1200 mg PO three times a week (M, W, F). Patient is having vision changes and seeing floaters now, which is known side effect of Ethambutol.  4.  Start Levaquin 750mg PO q 48 hours-   5.  Continue pyrazinamide 1500 mg PO three times a week (M, W, F)  6.  Continue vitamin B6 50 mg PO daily"    - ID contacted  [facility]-  AARON called and discussed with wife 1/10/24- ( not contagious to others) to follow up with AARON    - Renal f/u appreciated, for NARA on CKD3 (baseline Cr 1.8) : S Cr baseline 1.91( 12/15/23)-> 2.71 ( 1/5)-> 2.52( 1/6)-> 2.13( 1/7)-> 2.23( 1/8)-> 2.05(1/9) , UA noted w. mild proteinuria--> 1.9 on 1/14--> 1.8 1/15-  IVF and tolerating oral   - ANCA negative  - renal concern AIN started on prednisone 60 mg # 6- given IVF as well, renal function continue to improve.  - fu renal for steroids dosing  - on sodium bicarbs 325 mg po bid   - adequate hydration   - for follow up to refer to Dr. Elif Garcia ( [phone] ) Dr. Garcia has office hours in both [address] and [place].     - Hypercalcemia: could be related to MTB. - avoid calcium supplement and vitamin D , adequate hydration.  -  trend BMP for S Cr and avoid nephrotoxic meds/NSAIDS/iv contrast dye     - pain control   - acetaminophen     Tablet .. 975 milliGRAM(s) Oral every 6 hours PRN  - oxyCODONE    IR 5 milliGRAM(s) Oral every 6 hours PRN  - bowel regimen:   - bisacodyl 5 milliGRAM(s) Oral at bedtime  - polyethylene glycol 3350 17 Gram(s) Oral daily- to restart  - senna 2 Tablet(s) Oral at bedtime-to restart.    - HTN: and tachycardia.- much improved.   - currently on Hydralazine 25 mg q 8 hourly  - labetalol 200 mg tid.      - HLD: atorvastatin 10 milliGRAM(s) Oral at bedtime  - GI ppx: pantoprazole    Tablet 40 milliGRAM(s) Oral before breakfast  - DVT ppx: heparin   Injectable 5000 Unit(s) SubCutaneous every 8 hours    Contacts:   Referring Heme Onc Dr Joanne Jensen     POC as d/w family and CTS PA, wife, ID.

## 2024-01-16 NOTE — PROGRESS NOTE ADULT - ASSESSMENT
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 Hospital course c/b NARA non oliguric initially thought to be prerenal/ATN with partial response to IVF down to 2.1 ranges. However, gradual uptrend noted starting on 1/8  (2.23-2.0-2.1) w/ highest SCr noted on 1/11 at 2.43 in addition to peripheral Eosinophilia 13.3% on 1/10 suggesting additional renal insult such as AIN drug induced     1. Non oliguric NARA sec to prerenal/ATN vs possible AIN - initial renal insult attributed to ATN/pre renal etiology which responded to IVF trial w/ SCr down to 2.1 ranges  Gradual uptrend noted on renal function up to 2.43 ranges in the setting of normal renal US, stable hemodynamics, bland UA and peripheral Eosinophilia suggesting acute interstitial nephritis (drug induced) possibly related to rifampin use (started on 1/6)  Empirically started on prednisone 60mg because of these findings on 1/11. Has received 5 days so far with most recent SCr at 1.8 ranges  Discussed w/ ID and since Rifampin is a crucial anti-tb medication, stopping this medication will lead to poor clinical outcomes, it was decided to c/w this agent in addition to PO steroids.   Will c/w prednisone 50mg PO daily in anticipation of slow taperC/w strict I/O for monitoring  c/w PUD ppx w/ H2 antagonist   Adjust and dose all medications to current GFR  Work up for other potential disease mimics unremarkable   Trend BMP for additional renal function monitoring   Treatment for AIN-DI is usually between 4-6 weeks    2. Hx of HTN -  c/w hydralazine 25mg TID and Labetalol 100mg PO BID    3. Hypercalcemia w/ suppressed PTH - in the setting of on going granulomatous disease (rest of hyperCa+ work up unremarkable)  Maintain adequate volume status  Maintain low calcium intake (no more than 400 mg/day) and low oxalate diet   Avoid dietary vitamin D supplements  Expect gradual correction of Ca+ levels as treatment of the underlying disease continues    4. CKD Stage III - at baseline  c/w Bicarb 1300mg PO TID   C/w to avoid NSAIDs and other nephrotoxins     Discussed w/ primary team

## 2024-01-16 NOTE — DISCHARGE NOTE PROVIDER - PROVIDER TOKENS
FREE:[LAST:[[facility] appointment],PHONE:[phone]],FAX:[(   )    -],ADDRESS:[[address] [place] [address]],SCHEDULEDAPPT:[02/06/2024],SCHEDULEDAPPTTIME:[12:30 PM]] FREE:[LAST:[[facility] appointment],PHONE:[phone]],FAX:[(   )    -],ADDRESS:[[address] [place] [address]],SCHEDULEDAPPT:[02/06/2024],SCHEDULEDAPPTTIME:[12:30 PM]] FREE:[LAST:[[facility] appointment],PHONE:[phone]],FAX:[(   )    -],ADDRESS:[[address] [place] [address]],SCHEDULEDAPPT:[02/06/2024],SCHEDULEDAPPTTIME:[12:30 PM]],PROVIDER:[TOKEN:[[id number]],FOLLOWUP:[2 weeks]] FREE:[LAST:[[facility] appointment],PHONE:[phone]],FAX:[(   )    -],ADDRESS:[[address] [place] [address]],SCHEDULEDAPPT:[02/06/2024],SCHEDULEDAPPTTIME:[12:30 PM]],PROVIDER:[TOKEN:[[id number]],FOLLOWUP:[2 weeks]] FREE:[LAST:[[facility] health appointment],PHONE:[phone]],FAX:[(   )    -],ADDRESS:[[address] [place] [address]],SCHEDULEDAPPT:[02/06/2024],SCHEDULEDAPPTTIME:[12:30 PM]],PROVIDER:[TOKEN:[[id number]],FOLLOWUP:[2 weeks]],PROVIDER:[TOKEN:[[id number]],FOLLOWUP:[1 week]],FREE:[LAST:[Sun],FIRST:[Hay],PHONE:[phone]],FAX:[(   )    -],ADDRESS:[[address] [place] [address]  Additional phone number: [phone]],FOLLOWUP:[1 week]] FREE:[LAST:[[facility] health appointment],PHONE:[phone]],FAX:[(   )    -],ADDRESS:[[address] [place] [address]],SCHEDULEDAPPT:[02/06/2024],SCHEDULEDAPPTTIME:[12:30 PM]],PROVIDER:[TOKEN:[[id number]],FOLLOWUP:[2 weeks]],PROVIDER:[TOKEN:[[id number]],FOLLOWUP:[1 week]],FREE:[LAST:[Sun],FIRST:[Hay],PHONE:[phone]],FAX:[(   )    -],ADDRESS:[[address] [place] [address]  Additional phone number: [phone]],FOLLOWUP:[1 week]] PROVIDER:[TOKEN:[[id number]],FOLLOWUP:[2 weeks]],PROVIDER:[TOKEN:[[id number]],SCHEDULEDAPPT:[01/29/2024],SCHEDULEDAPPTTIME:[09:00 AM]],FREE:[LAST:[[facility] appointment],PHONE:[phone]],FAX:[(   )    -],ADDRESS:[[address] [place] [address]],SCHEDULEDAPPT:[02/06/2024],SCHEDULEDAPPTTIME:[12:30 PM]],FREE:[LAST:[Sun],FIRST:[Hay],PHONE:[phone]],FAX:[(   )    -],ADDRESS:[[address] [place] [address]  Additional phone number: [phone]],FOLLOWUP:[1 week]],FREE:[LAST:[Job],FIRST:[John],PHONE:[phone]],FAX:[(   )    -],ADDRESS:[[address] [place] [address]],SCHEDULEDAPPT:[01/26/2024]] PROVIDER:[TOKEN:[[id number]],FOLLOWUP:[2 weeks]],PROVIDER:[TOKEN:[[id number]],SCHEDULEDAPPT:[01/29/2024],SCHEDULEDAPPTTIME:[09:00 AM]],FREE:[LAST:[[facility] appointment],PHONE:[phone]],FAX:[(   )    -],ADDRESS:[[address] [place] [address]],SCHEDULEDAPPT:[02/06/2024],SCHEDULEDAPPTTIME:[12:30 PM]],FREE:[LAST:[Sun],FIRST:[Hay],PHONE:[phone]],FAX:[(   )    -],ADDRESS:[[address] [place] [address]  Additional phone number: [phone]],FOLLOWUP:[1 week]],FREE:[LAST:[Job],FIRST:[John],PHONE:[phone]],FAX:[(   )    -],ADDRESS:[[address] [place] [address]],SCHEDULEDAPPT:[01/26/2024]]

## 2024-01-16 NOTE — PROGRESS NOTE ADULT - ASSESSMENT
IMPRESSION:  73 year old M, Cantonese speaking, never a smoker, with PMHx of HTN, HLD, who is referred by Dr. Edward Jensen for an initial evaluation of pleural effusion. Patient reports right lower quadrant pain since Aug 2023, along with poor appetite, unintentionally weight loss around 20lbs, fatigue, progressively worse hoarseness and dyspnea. He had Shingles right arm in Oct 2023, and was subsequently diagnosed with vocal cord paralysis. No personal history of TB infection. Pt underwent R VATS, pleural biopsy decortication, on 1/5 with Dr. Kaiser. Intraop path revealed necrotizing granuloma. ID was consulted for w/u of necrotizing granuloma, and concern for active TB.  Patient is having vision changes and seeing floaters now, which is known side effect of Ethambutol.    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  Steroids are not completely contraindicated with TB and in fact in some cases of TB (TB meningitis and TB pericarditis), steroids are indicated.  I explained that rifampin is the most important and efficacious medication for TB stopping it will lead to worse outcomes.  For now ok to continue rifampin and steroids while work up is underway    Recommend:  1.  Continue Rifampin 600 mg PO daily  2.  Continue  mg PO daily  3.  Continu Levaquin 750mg PO q 48 hours  4.  Continue pyrazinamide 1500 mg PO three times a week (M, W, F)  5.  Continue vitamin B6 50 mg PO daily  6.  Continue airborne precautions   7.  Follow up AFB sputum cultures   8.   Given clinical improvement and smear negative x 3, my recommendation would be to stop airborne isolation after 2 weeks of active medications (1/20/2024).  Recommendation communicated to hospital epidemiology.   9. If primary team planning to discharge patient will need to submit TB discharge form (hospital epidemiology will reach out to you regarding this)    ID team 1 will follow

## 2024-01-16 NOTE — DISCHARGE NOTE PROVIDER - NPI NUMBER (FOR SYSADMIN USE ONLY) :
[[id number]] [UNKNOWN],[[id number]] [UNKNOWN],[[id number]] [UNKNOWN],[[id number]],[[id number]],[UNKNOWN] [[id number]],[[id number]],[UNKNOWN] [[id number]],[[id number]],[UNKNOWN],[UNKNOWN],[UNKNOWN] [[id number]],[[id number]],[UNKNOWN],[UNKNOWN],[UNKNOWN]

## 2024-01-16 NOTE — PROGRESS NOTE ADULT - SUBJECTIVE AND OBJECTIVE BOX
GUILLAUME MANNING , [id number],  [facility] [address]LA [id number] 01    Time of encounter : 10 am  interval removal of right pig-tail this morning.  blow-hole in place  clinically felt much better and stronger.   spoke with daughter /patient /wife regarding TB treatment/isolation etc. all questions answered, explained to patient and wife in Mandarin per daughter's request.    T(C): 36.6 (01-16-24 @ 08:53), Max: 36.9 (01-15-24 @ 13:11)  HR: 106 (01-16-24 @ 08:35) (90 - 106)  BP: 109/59 (01-16-24 @ 08:35) (95/62 - 143/73)  RR: 15 (01-16-24 @ 08:35) (14 - 17)  SpO2: 98% (01-16-24 @ 08:35) (97% - 98%)    acetaminophen     Tablet .. 975 milliGRAM(s) Oral every 6 hours PRN  atorvastatin 10 milliGRAM(s) Oral at bedtime  bisacodyl 5 milliGRAM(s) Oral at bedtime  famotidine    Tablet 10 milliGRAM(s) Oral daily  heparin   Injectable 5000 Unit(s) SubCutaneous every 8 hours  hydrALAZINE 25 milliGRAM(s) Oral every 8 hours  isoniazid 300 milliGRAM(s) Oral daily  labetalol 200 milliGRAM(s) Oral every 8 hours  lactated ringers. 1000 milliLiter(s) IV Continuous <Continuous>  levoFLOXacin  Tablet 750 milliGRAM(s) Oral every 48 hours  oxyCODONE    IR 5 milliGRAM(s) Oral every 4 hours PRN  polyethylene glycol 3350 17 Gram(s) Oral daily  pyrazinamide 1500 milliGRAM(s) Oral <User Schedule>  pyridoxine 50 milliGRAM(s) Oral daily  rifAMPin 600 milliGRAM(s) Oral daily  senna 2 Tablet(s) Oral at bedtime  sodium bicarbonate 325 milliGRAM(s) Oral two times a day      Physical Exam :    General exam :  saturating well on room air.   blow-hole on right chest, interval removal of right pig-tail  moving good air.  subcutaneous emphysema on bilateral cheeks and upper body- lower body spared.  no edema noted on bilateral lower ext  neuro - aao x 3 non focal.    CBC Full  -  ( 16 Jan 2024 05:30 )  WBC Count : 9.88 K/uL  RBC Count : 2.49 M/uL  Hemoglobin : 7.5 g/dL  Hematocrit : 23.5 %  Platelet Count - Automated : 312 K/uL  Mean Cell Volume : 94.4 fl  Mean Cell Hemoglobin : 30.1 pg  Mean Cell Hemoglobin Concentration : 31.9 gm/dL  Auto Neutrophil # : x  Auto Lymphocyte # : x  Auto Monocyte # : x  Auto Eosinophil # : x  Auto Basophil # : x  Auto Neutrophil % : x  Auto Lymphocyte % : x  Auto Monocyte % : x  Auto Eosinophil % : x  Auto Basophil % : x        01-16    139  |  110<H>  |  39<H>  ----------------------------<  91  4.0   |  20<L>  |  1.86<H>    Ca    9.3      16 Jan 2024 05:30  Mg     1.7     01-16    TPro  5.6<L>  /  Alb  2.6<L>  /  TBili  0.2  /  DBili  x   /  AST  26  /  ALT  33  /  AlkPhos  74  01-16    Daily     Daily   CAPILLARY BLOOD GLUCOSE          Urinalysis Basic - ( 16 Jan 2024 05:30 )    Color: x / Appearance: x / SG: x / pH: x  Gluc: 91 mg/dL / Ketone: x  / Bili: x / Urobili: x   Blood: x / Protein: x / Nitrite: x   Leuk Esterase: x / RBC: x / WBC x   Sq Epi: x / Non Sq Epi: x / Bacteria: x        PT/INR - ( 15 Edgardo 2024 08:21 )   PT: 12.5 sec;   INR: 1.10          PTT - ( 15 Edgardo 2024 08:21 )  PTT:34.6 sec           GUILLAUME MANNING , [id number],  [facility] [address]LA [address] 01    Time of encounter : 10 am  interval removal of right pig-tail this morning.  blow-hole in place  clinically felt much better and stronger.   spoke with daughter /patient /wife regarding TB treatment/isolation etc. all questions answered, explained to patient and wife in Mandarin per daughter's request.    T(C): 36.6 (01-16-24 @ 08:53), Max: 36.9 (01-15-24 @ 13:11)  HR: 106 (01-16-24 @ 08:35) (90 - 106)  BP: 109/59 (01-16-24 @ 08:35) (95/62 - 143/73)  RR: 15 (01-16-24 @ 08:35) (14 - 17)  SpO2: 98% (01-16-24 @ 08:35) (97% - 98%)    acetaminophen     Tablet .. 975 milliGRAM(s) Oral every 6 hours PRN  atorvastatin 10 milliGRAM(s) Oral at bedtime  bisacodyl 5 milliGRAM(s) Oral at bedtime  famotidine    Tablet 10 milliGRAM(s) Oral daily  heparin   Injectable 5000 Unit(s) SubCutaneous every 8 hours  hydrALAZINE 25 milliGRAM(s) Oral every 8 hours  isoniazid 300 milliGRAM(s) Oral daily  labetalol 200 milliGRAM(s) Oral every 8 hours  lactated ringers. 1000 milliLiter(s) IV Continuous <Continuous>  levoFLOXacin  Tablet 750 milliGRAM(s) Oral every 48 hours  oxyCODONE    IR 5 milliGRAM(s) Oral every 4 hours PRN  polyethylene glycol 3350 17 Gram(s) Oral daily  pyrazinamide 1500 milliGRAM(s) Oral <User Schedule>  pyridoxine 50 milliGRAM(s) Oral daily  rifAMPin 600 milliGRAM(s) Oral daily  senna 2 Tablet(s) Oral at bedtime  sodium bicarbonate 325 milliGRAM(s) Oral two times a day      Physical Exam :    General exam :  saturating well on room air.   blow-hole on right chest, interval removal of right pig-tail  moving good air.  subcutaneous emphysema on bilateral cheeks and upper body- lower body spared.  no edema noted on bilateral lower ext  neuro - aao x 3 non focal.    CBC Full  -  ( 16 Jan 2024 05:30 )  WBC Count : 9.88 K/uL  RBC Count : 2.49 M/uL  Hemoglobin : 7.5 g/dL  Hematocrit : 23.5 %  Platelet Count - Automated : 312 K/uL  Mean Cell Volume : 94.4 fl  Mean Cell Hemoglobin : 30.1 pg  Mean Cell Hemoglobin Concentration : 31.9 gm/dL  Auto Neutrophil # : x  Auto Lymphocyte # : x  Auto Monocyte # : x  Auto Eosinophil # : x  Auto Basophil # : x  Auto Neutrophil % : x  Auto Lymphocyte % : x  Auto Monocyte % : x  Auto Eosinophil % : x  Auto Basophil % : x        01-16    139  |  110<H>  |  39<H>  ----------------------------<  91  4.0   |  20<L>  |  1.86<H>    Ca    9.3      16 Jan 2024 05:30  Mg     1.7     01-16    TPro  5.6<L>  /  Alb  2.6<L>  /  TBili  0.2  /  DBili  x   /  AST  26  /  ALT  33  /  AlkPhos  74  01-16    Daily     Daily   CAPILLARY BLOOD GLUCOSE          Urinalysis Basic - ( 16 Jan 2024 05:30 )    Color: x / Appearance: x / SG: x / pH: x  Gluc: 91 mg/dL / Ketone: x  / Bili: x / Urobili: x   Blood: x / Protein: x / Nitrite: x   Leuk Esterase: x / RBC: x / WBC x   Sq Epi: x / Non Sq Epi: x / Bacteria: x        PT/INR - ( 15 Edgardo 2024 08:21 )   PT: 12.5 sec;   INR: 1.10          PTT - ( 15 Edgardo 2024 08:21 )  PTT:34.6 sec

## 2024-01-16 NOTE — DISCHARGE NOTE PROVIDER - HOSPITAL COURSE
Patient discussed on morning rounds with  _____    Operation Date:    Primary Surgeon/Attending MD:    Referring Physician:  _ _ _ _ _ _ _ _ _ _ _ _  HOSPITAL COURSE:    73 year old M, Cantonese speaking, never a smoker, with PMHx of HTN, HLD, who is referred by Dr. Edward Jensen for an initial evaluation of pleural effusion. Patient reports right lower quadrant pain since Aug 2023, along with poor appetite, unintentionally weight loss around 20lbs, fatigue, progressively worse hoarseness and dyspnea. Pt underwent R VATS, pleural biopsy decortication, on 1/5 with Dr. Kaiser. Intraop path revealed necrotizing granuloma. ID consulted for TB workup and management. He arrived to [facility] post op with 2 CTs in place, on suction, with air leaks. POD2 AFB samples sent and obtained and he was started on antibiotic regimen per ID. POD3 AFB came back negative but on POD4 OR cultures came back positive for TB. One CT was removed with stable CXR. POD5 second CT removed. POD6 new left pneumothorax and increase in subcutaneous emphysema noted, left chest blowhole and pigtail were placed. Reported vision floaters so ethambutol discontinued and transitioned to Levofloxcin. POD 7, renal concerned that rifampin could be contributing to NARA, but per ID, rifampin is a crucial medication in the management of his TB, and give that Cr was elevated before pt was started on rifampin, suspicion is low for rifampin causing AIN from ID perspective. Will keep pt on rifampin for the time being and continue to monitor. On POD 7, there was increase in subQ emphysema down to scrotum. POD 8, pending CT chest, no SubQ emphysema in scrotum appreciated Maintaining pigtail to suction and wound vac to blow hole. POD 9, CT chest completed showing extensive subq emphysema and R small apical PTX. POD 10 subq emphysema greatly improved. CXR shows pigtail likely dislodged outside of chest wall. Watersealed at midnight. POD 11 CT removed. Plan to remove wound vacc. Awaiting final renal and ID reccs for likely DC tomorrow. Wound care plan for daily changes of wet to dry dressing with gauze and sterile saline once daily.     _ _ _ _ _ _ _ _ _ _ _ _  DISCHARGE PHYSICAL EXAM:  General:  Neuro:  Cardio:  Pulm:  GI/:  Vascular:  Extremities:  Incisions:  _ _ _ _ _ _ _ _ _ _ _ _  REMOVAL CHECKLIST:        [ ] Epicardial wires          [ ] Stitches/tie downs,   If no, why? ______          [ ] PICC/Midline,   If no, why? ________  _ _ _ _ _ _ _ _ _ _ _ _   MEDICATION DISCHARGE CHECKLIST    CABG        [ ] Aspirin, [  ] Contraindicated, Reason_______________________________        [ ] Plavix, [  ] Contraindicated, Reason_______________________________        [ ] Statin, [  ] Contraindicated, Reason_______________________________        [ ] Lasix , [  ] Contraindicated, Reason_______________________________              Duration: _____        [ ] Beta-Blocker, [  ] Contraindicated, Reason_______________________________       Surgical Valve        [ ] Aspirin, [  ] Contraindicated, Reason_______________________________        [ ] Lasix, [  ] Contraindicated, Reason_______________________________             Duration: _____        [ ] Beta-Blocker, [  ] Contraindicated, Reason_______________________________        TAVR Valve        [ ] Aspirin, [  ] Contraindicated, Reason_______________________________        [ ] Plavix, [ ] Contraindicated, Reason _______________________________        PCI        [ ] Aspirin, [  ] Contraindicated, Reason_______________________________        [ ] Plavix, [ ] Contraindicated, Reason _______________________________        Anticoagulation        [ ] NOAC, [ ] Reason _______________________________              Cost/Insurance barriers addressed: YES/NO         [ ] Coumadin, [ ] Reason _______________________________              Dose:___________              INR Goal: ___________              Follow up established: YES/NO  _ _ _ _ _ _ _ _ _ _ _ _  RELEVANT LABS/IMAGING:    _ _ _ _ _ _ _ _ _ _ _ _  CLINICAL FOLLOW UP NEEDS:     [ ] Labwork           Labs needed:           When/Timing:           Outpatient team aware: YES/NO         [ ] Imaging            Type:           When/Timing:           Outpatient team aware: YES/NO       [ ] Home equipment           Type: (i.e. wound vac, pneumostat, prevena, wet/dry dressings, picc/midlines, MCOT, terry etc)           Specific needs:           Outpatient team aware: YES/NO  _ _ _ _ _ _ _ _ _ _ _ _  Over 35 minutes was spent with the patient reviewing the discharge material including medications, follow up appointments, recovery, concerning symptoms, and how to contact their health care providers if they have questions   Patient discussed on morning rounds with Dr. Lopez     Operation Date: 1/5: R VATS RA Pleural biopsy and Decortication  Frozen + Necrotizing granuloma     Primary Surgeon/Attending MD: Dr. Kaiser     Referring Physician: Dr. Edward Jensen   _ _ _ _ _ _ _ _ _ _ _ _  HOSPITAL COURSE:  73 year old M, Cantonese speaking, never a smoker, with PMHx of HTN, HLD, who is referred by Dr. Edward Jensen for an initial evaluation of pleural effusion. Patient reports right lower quadrant pain since Aug 2023, along with poor appetite, unintentionally weight loss around 20lbs, fatigue, progressively worse hoarseness and dyspnea. Pt underwent R VATS, pleural biopsy decortication, on 1/5 with Dr. Kaiser. Intraop path revealed necrotizing granuloma. ID consulted for TB workup and management. He arrived to [facility] post op with 2 CTs in place, on suction, with air leaks. POD2 AFB samples sent and obtained and he was started on antibiotic regimen per ID. POD3 AFB came back negative but on POD4 OR cultures came back positive for TB. One CT was removed with stable CXR. POD5 second CT removed. POD6 new left pneumothorax and increase in subcutaneous emphysema noted, left chest blowhole and pigtail were placed. Reported vision floaters so ethambutol discontinued and transitioned to Levofloxcin. POD 7, renal concerned that rifampin could be contributing to NARA, but per ID, rifampin is a crucial medication in the management of his TB, and give that Cr was elevated before pt was started on rifampin, suspicion is low for rifampin causing AIN from ID perspective. Will keep pt on rifampin for the time being and continue to monitor. On POD 7, there was increase in subQ emphysema down to scrotum. POD 8, pending CT chest, no SubQ emphysema in scrotum appreciated Maintaining pigtail to suction and wound vac to blow hole. POD 9, CT chest completed showing extensive subq emphysema and R small apical PTX. POD 10 subq emphysema greatly improved. CXR shows pigtail likely dislodged outside of chest wall. Watersealed at midnight. POD 11 CT removed. Removed wound vac. Wound care plan for daily changes of wet to dry dressing with gauze and sterile saline once daily by family for one week and then visiting nurse. On POD 12 ID final recs to continue current TB abx regiment and EKG was obtained prior to DC with QTc less then 500, continued levoquin Q48hrs. Dr. Alcala stated no labs needed, their office will obtain at follow up appointment. Renal provided Prednisone taper (50 mg x5 days and then decrease by 10 mg x5 days and then stop). Education provided to pts family regarding Wet to dry dressing changes due to visiting nurse unable to come for one week due to isolation status. AARON contacted on 1/16, appointment established and pt is to remain on isolation at home until 1/21. Family who has already been exposed can be with you at home but they must isolate as well, educated family on this matter. At time of discharge pt tolerated a PO diet, ambulated without assistance and had a post op BM. Pt denies dizziness, vision changes, chest pain, palpitations, shortness of breath, cough, n/v/d, extremity swelling, calf tenderness. Pts BP meds changed on this admission, please make adjustments as an out pt as indicated.   _ _ _ _ _ _ _ _ _ _ _ _  DISCHARGE PHYSICAL EXAM:  GEN: NAD, looks comfortable  Psych: Mood appropriate  Neuro: A&Ox3.  No focal deficits.  Moving all extremities.   HEENT: No obvious abnormalities  CV: S1S2, regular, no murmurs appreciated.  No carotid bruits.  No JVD  Lungs: Clear B/L.  No wheezing, rales or rhonchi  ABD: Soft, non-tender, non-distended.  +Bowel sounds  EXT: Warm and well perfused.  No peripheral edema noted  Musculoskeletal: Moving all extremities with normal ROM, no joint swelling  PV: Pedal pulses palpables:  _ _ _ _ _ _ _ _ _ _ _ _  REMOVAL CHECKLIST:        [ ] Stitches/tie downs,   If no, why? prior CT site   _ _ _ _ _ _ _ _ _ _ _ _    < from: Xray Chest 1 View- PORTABLE-Routine (Xray Chest 1 View- PORTABLE-Routine in AM.) (01.17.24 @ 05:34) >    IMPRESSION:    Similar appearance to prior exam 1/16/2024. Scattered subcutaneous   emphysema again noted. No pneumothorax identified. No acute infiltrates   appearing. No significant pleural effusion.    --- End of Report ---        < end of copied text >      Over 35 minutes was spent with the patient reviewing the discharge material including medications, follow up appointments, recovery, concerning symptoms, and how to contact their health care providers if they have questions   Patient discussed on morning rounds with Dr. Lopez     Operation Date: 1/5: R VATS RA Pleural biopsy and Decortication  Frozen + Necrotizing granuloma     Primary Surgeon/Attending MD: Dr. Kaiser     Referring Physician: Dr. Edward Jensen   _ _ _ _ _ _ _ _ _ _ _ _  HOSPITAL COURSE:  73 year old M, Cantonese speaking, never a smoker, with PMHx of HTN, HLD, who is referred by Dr. Edward Jensen for an initial evaluation of pleural effusion. Patient reports right lower quadrant pain since Aug 2023, along with poor appetite, unintentionally weight loss around 20lbs, fatigue, progressively worse hoarseness and dyspnea. Pt underwent R VATS, pleural biopsy decortication, on 1/5 with Dr. Kaiser. Intraop path revealed necrotizing granuloma. ID consulted for TB workup and management. He arrived to [facility] post op with 2 CTs in place, on suction, with air leaks. POD2 AFB samples sent and obtained and he was started on antibiotic regimen per ID. POD3 AFB came back negative but on POD4 OR cultures came back positive for TB. One CT was removed with stable CXR. POD5 second CT removed. POD6 new left pneumothorax and increase in subcutaneous emphysema noted, left chest blowhole and pigtail were placed. Reported vision floaters so ethambutol discontinued and transitioned to Levofloxcin. POD 7, renal concerned that rifampin could be contributing to NARA, but per ID, rifampin is a crucial medication in the management of his TB, and give that Cr was elevated before pt was started on rifampin, suspicion is low for rifampin causing AIN from ID perspective. Will keep pt on rifampin for the time being and continue to monitor. On POD 7, there was increase in subQ emphysema down to scrotum. POD 8, pending CT chest, no SubQ emphysema in scrotum appreciated Maintaining pigtail to suction and wound vac to blow hole. POD 9, CT chest completed showing extensive subq emphysema and R small apical PTX. POD 10 subq emphysema greatly improved. CXR shows pigtail likely dislodged outside of chest wall. Watersealed at midnight. POD 11 CT removed. Removed wound vac. Wound care plan for daily changes of wet to dry dressing with gauze and sterile saline once daily by family for one week and then visiting nurse. On POD 12 ID final recs to continue current TB abx regiment and EKG was obtained prior to DC with QTc less then 500, continued levoquin Q48hrs. Dr. Alcala stated no labs needed, their office will obtain at follow up appointment. Renal provided Prednisone taper (50 mg x5 days and then decrease by 10 mg x5 days and then stop). Education provided to pts family regarding Wet to dry dressing changes due to visiting nurse unable to come for one week due to isolation status. AARON contacted on 1/16, appointment established and pt is to remain on isolation at home until 1/21. Family who has already been exposed can be with you at home but they must isolate as well, educated family on this matter. At time of discharge pt tolerated a PO diet, ambulated without assistance and had a post op BM. Pt denies dizziness, vision changes, chest pain, palpitations, shortness of breath, cough, n/v/d, extremity swelling, calf tenderness. Pts BP meds changed on this admission, please make adjustments as an out pt as indicated.   _ _ _ _ _ _ _ _ _ _ _ _  DISCHARGE PHYSICAL EXAM:  GEN: NAD, looks comfortable  Psych: Mood appropriate  Neuro: A&Ox3.  No focal deficits.  Moving all extremities.   HEENT: No obvious abnormalities  CV: S1S2, regular, no murmurs appreciated.  No carotid bruits.  No JVD  Lungs: Clear B/L.  No wheezing, rales or rhonchi  ABD: Soft, non-tender, non-distended.  +Bowel sounds  EXT: Warm and well perfused.  No peripheral edema noted  Musculoskeletal: Moving all extremities with normal ROM, no joint swelling  Incisions: VATs sites are clean dry and intact no drainage or bleeding, Blow hole site is clean dry and intact without drainage or bleeding, wet to dry dressings applied  _ _ _ _ _ _ _ _ _ _ _ _  REMOVAL CHECKLIST:        [ ] Stitches/tie downs,   If no, why? prior CT site   _ _ _ _ _ _ _ _ _ _ _ _    < from: Xray Chest 1 View- PORTABLE-Routine (Xray Chest 1 View- PORTABLE-Routine in AM.) (01.17.24 @ 05:34) >    IMPRESSION:    Similar appearance to prior exam 1/16/2024. Scattered subcutaneous   emphysema again noted. No pneumothorax identified. No acute infiltrates   appearing. No significant pleural effusion.    --- End of Report ---        < end of copied text >      Over 35 minutes was spent with the patient reviewing the discharge material including medications, follow up appointments, recovery, concerning symptoms, and how to contact their health care providers if they have questions

## 2024-01-16 NOTE — DISCHARGE NOTE PROVIDER - NSDCFUSCHEDAPPT_GEN_ALL_CORE_FT
John Kaiser  [facility] Physician [address]  Scheduled Appointment: 01/19/2024     John Kaiser  [facility] Physician [address]  Scheduled Appointment: 01/19/2024     John Kaiser  [facility] Physician [address]  Scheduled Appointment: 01/26/2024     John Kaiser  [facility] Physician [address]  Scheduled Appointment: 01/26/2024     John Kaiser  [facility]  THORS[address]  Scheduled Appointment: 01/26/2024    Ceasar Alcala  [facility]  INFD[address]  Scheduled Appointment: 01/29/2024     John Kaiser  [facility]  [address]  Scheduled Appointment: 01/26/2024    Ceasar Alcala  [facility]  INFDISEASE [address]  Scheduled Appointment: 01/29/2024

## 2024-01-16 NOTE — DISCHARGE NOTE PROVIDER - CARE PROVIDER_API CALL
[facility] appointment,   [address] [facility]  4th [place] [address]  Phone: [phone]  Fax: (   )    -  Scheduled Appointment: 02/06/2024 12:30 PM   [facility] appointment,   [address] [facility]  4th [place] [address]  Phone: [phone]  Fax: (   )    -  Scheduled Appointment: 02/06/2024 12:30 PM   [facility] appointment,   [address] [facility]  4th [place] [address]  Phone: [phone]  Fax: (   )    -  Scheduled Appointment: 02/06/2024 12:30 PM    Elif Garcia  Nephrology  [address] [address]  Phone: [phone]  Fax: [phone]  Follow Up Time: 2 weeks   [facility] appointment,   [address] [facility]  4th [place] [address]  Phone: [phone]  Fax: (   )    -  Scheduled Appointment: 02/06/2024 12:30 PM    Elif Garcia  Nephrology  [address] [address]  Phone: [phone]  Fax: [phone]  Follow Up Time: 2 weeks   [facility] appointment,   [address] [facility]  4th [place] [address]  Phone: [phone]  Fax: (   )    -  Scheduled Appointment: 02/06/2024 12:30 PM    [facility]  Phone: [phone]  Fax: ()-  Follow Up Time: 2 weeks    Ceasar Alcala  Infectious Disease  [address]  [place] [address]  Phone: [phone]  Fax: [phone]  Follow Up Time: 1 week    Hay Rodriguez  [address] [place] [address]  Additional phone number: [phone]  Phone: [phone]  Fax: (   )    -  Follow Up Time: 1 week   [facility],   [address] [facility]  4th [place] [address]  Phone: [phone]  Fax: (   )    -  Scheduled Appointment: 02/06/2024 12:30 PM    [facility]  Phone: [phone]  Fax: ()-  Follow Up Time: 2 weeks    Ceasar Alcala  Infectious Disease  [address]  [place] [address]  Phone: [phone]  Fax: [phone]  Follow Up Time: 1 week    Hay Rodriguez  [address] [place] [address]  Additional phone number: [phone]  Phone: [phone]  Fax: (   )    -  Follow Up Time: 1 week   [facility]  Phone: [phone]  Fax: ()-  Follow Up Time: 2 weeks    Ceasar Alcala  Infectious Disease  [address]  [place] [address]  Phone: [phone]  Fax: [phone]  Scheduled Appointment: 01/29/2024 09:00 AM    [facility] appointment,   [address] [facility]  4th [place] [address]  Phone: [phone]  Fax: (   )    -  Scheduled Appointment: 02/06/2024 12:30 PM    Hay Rodriguez  [address] [facility] [address]  Additional phone number: [phone]  Phone: [phone]  Fax: (   )    -  Follow Up Time: 1 week    John Kaiser  [address] [address]  Phone: [phone]  Fax: (   )    -  Scheduled Appointment: 01/26/2024   [facility]  Phone: [phone]  Fax: ()-  Follow Up Time: 2 weeks    Ceasar Alcala  Infectious Disease  [address]  [place] [address]  Phone: [phone]  Fax: [phone]  Scheduled Appointment: 01/29/2024 09:00 AM    [facility] appointment,   [address] [facility]  4th [place] [address]  Phone: [phone]  Fax: (   )    -  Scheduled Appointment: 02/06/2024 12:30 PM    Hay Rodriguez  [address] [facility] [address]  Additional phone number: [phone]  Phone: [phone]  Fax: (   )    -  Follow Up Time: 1 week    John Kaiser  [address] [address]  Phone: [phone]  Fax: (   )    -  Scheduled Appointment: 01/26/2024

## 2024-01-16 NOTE — PROGRESS NOTE ADULT - SUBJECTIVE AND OBJECTIVE BOX
INTERVAL HPI/OVERNIGHT EVENTS:    Patient was seen and examined at bedside.  Reports swelling has improved.  Feels better     CONSTITUTIONAL:  Negative fever or chills, feels well, good appetite  EYES:  Negative  blurry vision or double vision  CARDIOVASCULAR:  Negative for chest pain or palpitations  RESPIRATORY:  Negative for cough, wheezing, or SOB   GASTROINTESTINAL:  Negative for nausea, vomiting, diarrhea, constipation, or abdominal pain  GENITOURINARY:  Negative frequency, urgency or dysuria  NEUROLOGIC:  No headache, confusion, dizziness, lightheadedness      ANTIBIOTICS/RELEVANT:    MEDICATIONS  (STANDING):  atorvastatin 10 milliGRAM(s) Oral at bedtime  bisacodyl 5 milliGRAM(s) Oral at bedtime  famotidine    Tablet 10 milliGRAM(s) Oral daily  heparin   Injectable 5000 Unit(s) SubCutaneous every 8 hours  hydrALAZINE 25 milliGRAM(s) Oral every 8 hours  isoniazid 300 milliGRAM(s) Oral daily  labetalol 200 milliGRAM(s) Oral every 8 hours  lactated ringers. 1000 milliLiter(s) (50 mL/Hr) IV Continuous <Continuous>  levoFLOXacin  Tablet 750 milliGRAM(s) Oral every 48 hours  polyethylene glycol 3350 17 Gram(s) Oral daily  pyrazinamide 1500 milliGRAM(s) Oral <User Schedule>  pyridoxine 50 milliGRAM(s) Oral daily  rifAMPin 600 milliGRAM(s) Oral daily  senna 2 Tablet(s) Oral at bedtime  sodium bicarbonate 325 milliGRAM(s) Oral two times a day    MEDICATIONS  (PRN):  acetaminophen     Tablet .. 975 milliGRAM(s) Oral every 6 hours PRN Mild Pain (1 - 3)  oxyCODONE    IR 5 milliGRAM(s) Oral every 4 hours PRN Moderate Pain (4 - 6)        Vital Signs Last 24 Hrs  T(C): 36.6 (16 Jan 2024 08:53), Max: 36.9 (15 Edgardo 2024 13:11)  T(F): 97.8 (16 Jan 2024 08:53), Max: 98.4 (15 Edgardo 2024 13:11)  HR: 106 (16 Jan 2024 08:35) (90 - 106)  BP: 109/59 (16 Jan 2024 08:35) (95/62 - 143/73)  BP(mean): 80 (16 Jan 2024 08:35) (75 - 101)  RR: 15 (16 Jan 2024 08:35) (14 - 17)  SpO2: 98% (16 Jan 2024 08:35) (97% - 98%)    Parameters below as of 16 Jan 2024 08:35  Patient On (Oxygen Delivery Method): room air        PHYSICAL EXAM:  Constitutional: non-toxic, no distress  Eyes:LIZY, EOMI  Ear/Nose/Throat: no oral lesion, no sinus tenderness on percussion	  Neck:  supple  Respiratory: CTA jose  Cardiovascular: S1S2 RRR, no murmurs  Gastrointestinal:soft, (+) BS, no HSM  Extremities:no e/e/c  Vascular: DP Pulse:	right normal; left normal      LABS:                        7.5    9.88  )-----------( 312      ( 16 Jan 2024 05:30 )             23.5     01-16    139  |  110<H>  |  39<H>  ----------------------------<  91  4.0   |  20<L>  |  1.86<H>    Ca    9.3      16 Jan 2024 05:30  Mg     1.7     01-16    TPro  5.6<L>  /  Alb  2.6<L>  /  TBili  0.2  /  DBili  x   /  AST  26  /  ALT  33  /  AlkPhos  74  01-16    PT/INR - ( 15 Edgardo 2024 08:21 )   PT: 12.5 sec;   INR: 1.10          PTT - ( 15 Edgardo 2024 08:21 )  PTT:34.6 sec  Urinalysis Basic - ( 16 Jan 2024 05:30 )    Color: x / Appearance: x / SG: x / pH: x  Gluc: 91 mg/dL / Ketone: x  / Bili: x / Urobili: x   Blood: x / Protein: x / Nitrite: x   Leuk Esterase: x / RBC: x / WBC x   Sq Epi: x / Non Sq Epi: x / Bacteria: x        MICROBIOLOGY:    Culture - Acid Fast - Sputum w/Smear . (01.08.24 @ 17:17)    Specimen Source: .Sputum Sputum   Acid Fast Bacilli Smear:   No acid-fast bacilli seen by fluorochrome stain   Culture Results:   Culture is being performed.    Culture - Acid Fast - Sputum w/Smear . (01.08.24 @ 10:45)    Specimen Source: .Sputum Sputum   Acid Fast Bacilli Smear:   No acid-fast bacilli seen by fluorochrome stain   Culture Results:   Culture is being performed.        RADIOLOGY & ADDITIONAL STUDIES:

## 2024-01-16 NOTE — DISCHARGE NOTE PROVIDER - CARE PROVIDERS DIRECT ADDRESSES
,DirectAddress_Unknown ,DirectAddress_Unknown,[email or web address] ,DirectAddress_Unknown,[email or web address] ,[email or web address],[email or web address],DirectAddress_Unknown ,DirectAddress_Unknown,[email or web address],[email or web address] ,[email or web address],DirectAddress_Unknown,DirectAddress_Unknown,[email or web address] ,[email or web address],DirectAddress_Unknown,DirectAddress_Unknown,DirectAddress_Unknown

## 2024-01-16 NOTE — PROGRESS NOTE ADULT - SUBJECTIVE AND OBJECTIVE BOX
Patient discussed on morning rounds with Dr. Lopez    OPERATION & DATE: 1/5 R VATS, pleural biopsy, decortication     SUBJECTIVE ASSESSMENT: resting comfortably in bed in NAD, pt states he feels better each day. Subcutaneous emphysema still decreasing.     VITAL SIGNS:  Vital Signs Last 24 Hrs  T(C): 36.6 (16 Jan 2024 08:53), Max: 36.9 (15 Edgardo 2024 13:11)  T(F): 97.8 (16 Jan 2024 08:53), Max: 98.4 (15 Edgardo 2024 13:11)  HR: 106 (16 Jan 2024 08:35) (90 - 106)  BP: 109/59 (16 Jan 2024 08:35) (95/62 - 143/73)  BP(mean): 80 (16 Jan 2024 08:35) (75 - 101)  RR: 15 (16 Jan 2024 08:35) (14 - 17)  SpO2: 98% (16 Jan 2024 08:35) (97% - 98%)    Parameters below as of 16 Jan 2024 08:35  Patient On (Oxygen Delivery Method): room air      I&O's Detail    15 Edgardo 2024 07:01  -  16 Jan 2024 07:00  --------------------------------------------------------  IN:    Lactated Ringers: 1200 mL    Oral Fluid: 1100 mL  Total IN: 2300 mL    OUT:    Chest Tube (mL): 8 mL    VAC (Vacuum Assisted Closure) System (mL): 10 mL    Voided (mL): 1400 mL  Total OUT: 1418 mL    Total NET: 882 mL        CHEST TUBE:  removed  DEON DRAIN:  none  EPICARDIAL WIRES: none  STITCHES: none  STAPLES: none  TRUJILLO: none  CENTRAL LINE: none  MIDLINE/PICC: none  WOUND VAC: wound vac in place over R chest in blowhole    PHYSICAL EXAM:  General: resting comfortably in bed in NAD  Neurological: AOx3. Motor skills grossly intact  Cardiovascular: Normal S1/S2. Regular rate/rhythm. No murmurs  Respiratory: Lungs CTA bilaterally. No wheezing or rales, subcutaneous emphysema over chest, back, and arms still present.   Gastrointestinal: +BS in all 4 quadrants. Non-distended. Soft. Non-tender  Extremities: Strength 5/5 b/l upper/lower extremities. Sensation grossly intact upper/lower extremities. No edema. No calf tenderness.  Vascular: Radial 2+bilaterally, DP 2+ b/l  Incision Sites: VATS incisions clean, dry, intact.      LABS:                        7.5    9.88  )-----------( 312      ( 16 Jan 2024 05:30 )             23.5     PT/INR - ( 15 Edgardo 2024 08:21 )   PT: 12.5 sec;   INR: 1.10          PTT - ( 15 Edgardo 2024 08:21 )  PTT:34.6 sec  01-16    139  |  110<H>  |  39<H>  ----------------------------<  91  4.0   |  20<L>  |  1.86<H>    Ca    9.3      16 Jan 2024 05:30  Mg     1.7     01-16    TPro  5.6<L>  /  Alb  2.6<L>  /  TBili  0.2  /  DBili  x   /  AST  26  /  ALT  33  /  AlkPhos  74  01-16    Urinalysis Basic - ( 16 Jan 2024 05:30 )    Color: x / Appearance: x / SG: x / pH: x  Gluc: 91 mg/dL / Ketone: x  / Bili: x / Urobili: x   Blood: x / Protein: x / Nitrite: x   Leuk Esterase: x / RBC: x / WBC x   Sq Epi: x / Non Sq Epi: x / Bacteria: x      MEDICATIONS  (STANDING):  atorvastatin 10 milliGRAM(s) Oral at bedtime  bisacodyl 5 milliGRAM(s) Oral at bedtime  famotidine    Tablet 10 milliGRAM(s) Oral daily  heparin   Injectable 5000 Unit(s) SubCutaneous every 8 hours  hydrALAZINE 25 milliGRAM(s) Oral every 8 hours  isoniazid 300 milliGRAM(s) Oral daily  labetalol 200 milliGRAM(s) Oral every 8 hours  lactated ringers. 1000 milliLiter(s) (50 mL/Hr) IV Continuous <Continuous>  levoFLOXacin  Tablet 750 milliGRAM(s) Oral every 48 hours  polyethylene glycol 3350 17 Gram(s) Oral daily  predniSONE   Tablet 60 milliGRAM(s) Oral daily  pyrazinamide 1500 milliGRAM(s) Oral <User Schedule>  pyridoxine 50 milliGRAM(s) Oral daily  rifAMPin 600 milliGRAM(s) Oral daily  senna 2 Tablet(s) Oral at bedtime  sodium bicarbonate 325 milliGRAM(s) Oral two times a day    MEDICATIONS  (PRN):  acetaminophen     Tablet .. 975 milliGRAM(s) Oral every 6 hours PRN Mild Pain (1 - 3)  oxyCODONE    IR 5 milliGRAM(s) Oral every 4 hours PRN Moderate Pain (4 - 6)    RADIOLOGY & ADDITIONAL TESTS:

## 2024-01-16 NOTE — PROGRESS NOTE ADULT - ASSESSMENT
73 year old M, Cantonese speaking, never a smoker, with PMHx of HTN, HLD, who is referred by Dr. Edward Jensen for an initial evaluation of pleural effusion. Patient reports right lower quadrant pain since Aug 2023, along with poor appetite, unintentionally weight loss around 20lbs, fatigue, progressively worse hoarseness and dyspnea. Pt underwent R VATS, pleural biopsy decortication, on 1/5 with Dr. Kaiser. Intraop path revealed necrotizing granuloma. ID consulted for TB workup and management. He arrived to [facility] post op with 2 CTs in place, on suction, with air leaks. POD2 AFB samples sent and obtained and he was started on antibiotic regimen per ID. POD3 AFB came back negative but on POD4 OR cultures came back positive for TB. One CT was removed with stable CXR. POD5 second CT removed. POD6 new left pneumothorax and increase in subcutaneous emphysema noted, left chest blowhole and pigtail were placed. Reported vision floaters so ethambutol discontinued and transitioned to Levofloxcin. POD 7, renal concerned that rifampin could be contributing to NARA, but per ID, rifampin is a crucial medication in the management of his TB, and give that Cr was elevated before pt was started on rifampin, suspicion is low for rifampin causing AIN from ID perspective. Will keep pt on rifampin for the time being and continue to monitor. On POD 7, there was increase in subQ emphysema down to scrotum. POD 8, pending CT chest, no SubQ emphysema in scrotum appreciated Maintaining pigtail to suction and wound vac to blow hole. POD 9, CT chest completed showing extensive subq emphysema and R small apical PTX. POD 10 subq emphysema greatly improved. CXR shows pigtail likely dislodged outside of chest wall. Watersealed at midnight. POD 11 CT removed. Plan to remove wound vacc. Awaiting final renal and ID reccs for likely DC tomorrow.     Plan:    Neurovascular:   -Pain well controlled with current regimen. PRN's: oxy, tylenol    Cardiovascular:   -Hemodynamically stable.   -Monitor: BP, HR, tele  -HTN    -c/w labetolol 200 Q 8 h    -c/w hydralazine 25mg q8h  -HLD    -c/w lipitor    Respiratory:   -Oxygenating well on room air  -Encourage continued use of IS 10x/hr and frequent ambulation  -CXR: no ptx s/p CT removal    -CT removed  -Subcutaneous emphysema    -plan to remove wound vac today and replace with wet to dry dressing    GI:  -GI PPX: pepcid  -PO Diet  -Bowel Regimen: dulcolax, miralax, senna     Renal / :  -Continue to monitor renal function: BUN/Cr 39/1.86  -Cr improving    -appreciate renal reccs    -c/w LR @ 50cc/hr    -c/w bicarb  BID    -c/w prednisone 60mg IV BID  -will f/u with renal regarding steroid taper and plan for discharge reccs  -Monitor I/O's daily     Endocrine:    -No hx of DM or thyroid dx  -A1c: 5.2  -TSH: .92    Hematologic:  -CBC: H/H- 7.5/23.5  -H/H decreasing, will repeat CBC in afternoon and transfuse if Hgb continues to decrease  -Coagulation Panel.    ID:  -Temperature: 36  -TB    -c/w TB abx therapy (isoniazid, rifampin, pyrazinamide, levaquin)  -CBC: WBC- 9.8  -Continue to observe for SIRS/Sepsis Syndrome.    Prophylaxis:  -DVT prophylaxis with 5000 SubQ Heparin q8h.  -Continue with SCD's b/l while patient is at rest     Disposition:  -Discharge home once patient is medically ready     73 year old M, Cantonese speaking, never a smoker, with PMHx of HTN, HLD, who is referred by Dr. Edward Jensen for an initial evaluation of pleural effusion. Patient reports right lower quadrant pain since Aug 2023, along with poor appetite, unintentionally weight loss around 20lbs, fatigue, progressively worse hoarseness and dyspnea. Pt underwent R VATS, pleural biopsy decortication, on 1/5 with Dr. Kaiser. Intraop path revealed necrotizing granuloma. ID consulted for TB workup and management. He arrived to [facility] post op with 2 CTs in place, on suction, with air leaks. POD2 AFB samples sent and obtained and he was started on antibiotic regimen per ID. POD3 AFB came back negative but on POD4 OR cultures came back positive for TB. One CT was removed with stable CXR. POD5 second CT removed. POD6 new left pneumothorax and increase in subcutaneous emphysema noted, left chest blowhole and pigtail were placed. Reported vision floaters so ethambutol discontinued and transitioned to Levofloxcin. POD 7, renal concerned that rifampin could be contributing to NARA, but per ID, rifampin is a crucial medication in the management of his TB, and give that Cr was elevated before pt was started on rifampin, suspicion is low for rifampin causing AIN from ID perspective. Will keep pt on rifampin for the time being and continue to monitor. On POD 7, there was increase in subQ emphysema down to scrotum. POD 8, pending CT chest, no SubQ emphysema in scrotum appreciated Maintaining pigtail to suction and wound vac to blow hole. POD 9, CT chest completed showing extensive subq emphysema and R small apical PTX. POD 10 subq emphysema greatly improved. CXR shows pigtail likely dislodged outside of chest wall. Watersealed at midnight. POD 11 CT removed. Changed wound vac to wet to dry dressing. Spoke with [facility] who reccomends pt isolate at home until Sunday 1/21 but family can remained unmasked with him at home given that they already were exposed. Spoke with Epidemiology who agreed with reccs.     Plan:    Neurovascular:   -Pain well controlled with current regimen. PRN's: oxy, tylenol    Cardiovascular:   -Hemodynamically stable.   -Monitor: BP, HR, tele  -HTN    -c/w labetolol 200 Q 8 h    -c/w hydralazine 25mg q8h  -HLD    -c/w lipitor    Respiratory:   -Oxygenating well on room air  -Encourage continued use of IS 10x/hr and frequent ambulation  -CXR: no ptx s/p CT removal    -CT removed  -Subcutaneous emphysema    -plan to remove wound vac today and replace with wet to dry dressing    GI:  -GI PPX: pepcid  -PO Diet  -Bowel Regimen: dulcolax, miralax, senna     Renal / :  -Continue to monitor renal function: BUN/Cr 39/1.86  -Cr improving    -appreciate renal reccs    -c/w LR @ 50cc/hr    -c/w bicarb  BID    -c/w prednisone 60mg IV BID  -will f/u with renal regarding steroid taper and plan for discharge reccs  -Monitor I/O's daily     Endocrine:    -No hx of DM or thyroid dx  -A1c: 5.2  -TSH: .92    Hematologic:  -CBC: H/H- 7.5/23.5  -H/H decreasing, will repeat CBC in afternoon and transfuse if Hgb continues to decrease  -Coagulation Panel.    ID:  -Temperature: 36  -TB    -c/w TB abx therapy (isoniazid, rifampin, pyrazinamide, levaquin)  -CBC: WBC- 9.8  -Continue to observe for SIRS/Sepsis Syndrome.    Prophylaxis:  -DVT prophylaxis with 5000 SubQ Heparin q8h.  -Continue with SCD's b/l while patient is at rest     Disposition:  -Discharge home once patient is medically ready     73 year old M, Cantonese speaking, never a smoker, with PMHx of HTN, HLD, who is referred by Dr. Edward Jensen for an initial evaluation of pleural effusion. Patient reports right lower quadrant pain since Aug 2023, along with poor appetite, unintentionally weight loss around 20lbs, fatigue, progressively worse hoarseness and dyspnea. Pt underwent R VATS, pleural biopsy decortication, on 1/5 with Dr. Kaiser. Intraop path revealed necrotizing granuloma. ID consulted for TB workup and management. He arrived to [facility] post op with 2 CTs in place, on suction, with air leaks. POD2 AFB samples sent and obtained and he was started on antibiotic regimen per ID. POD3 AFB came back negative but on POD4 OR cultures came back positive for TB. One CT was removed with stable CXR. POD5 second CT removed. POD6 new left pneumothorax and increase in subcutaneous emphysema noted, left chest blowhole and pigtail were placed. Reported vision floaters so ethambutol discontinued and transitioned to Levofloxcin. POD 7, renal concerned that rifampin could be contributing to NARA, but per ID, rifampin is a crucial medication in the management of his TB, and give that Cr was elevated before pt was started on rifampin, suspicion is low for rifampin causing AIN from ID perspective. Will keep pt on rifampin for the time being and continue to monitor. On POD 7, there was increase in subQ emphysema down to scrotum. POD 8, pending CT chest, no SubQ emphysema in scrotum appreciated Maintaining pigtail to suction and wound vac to blow hole. POD 9, CT chest completed showing extensive subq emphysema and R small apical PTX. POD 10 subq emphysema greatly improved. CXR shows pigtail likely dislodged outside of chest wall. Watersealed at midnight. POD 11 CT removed. Changed wound vac to wet to dry dressing. Spoke with [facility] who reccomends pt isolate at home until Sunday 1/21 but family can remained unmasked with him at home given that they already were exposed. Spoke with Epidemiology who agreed with reccs.     Plan:    Neurovascular:   -Pain well controlled with current regimen. PRN's: oxy, tylenol    Cardiovascular:   -Hemodynamically stable.   -Monitor: BP, HR, tele  -HTN    -c/w labetolol 200 Q 8 h    -c/w hydralazine 25mg q8h  -HLD    -c/w lipitor    Respiratory:   -Oxygenating well on room air  -Encourage continued use of IS 10x/hr and frequent ambulation  -CXR: no ptx s/p CT removal    -CT removed  -Subcutaneous emphysema    -plan to remove wound vac today and replace with wet to dry dressing    GI:  -GI PPX: pepcid  -PO Diet  -Bowel Regimen: dulcolax, miralax, senna     Renal / :  -Continue to monitor renal function: BUN/Cr 39/1.86  -Cr improving    -appreciate renal reccs    -c/w LR @ 50cc/hr    -c/w bicarb  BID    -c/w prednisone 60mg IV BID  -will f/u with renal regarding steroid taper and plan for discharge reccs  -Monitor I/O's daily     Endocrine:    -No hx of DM or thyroid dx  -A1c: 5.2  -TSH: .92    Hematologic:  -CBC: H/H- 7.5/23.5  -H/H decreasing, will repeat CBC in afternoon and transfuse if Hgb continues to decrease  -Coagulation Panel.    ID:  -Temperature: 36  -TB    -c/w TB abx therapy (isoniazid, rifampin, pyrazinamide, levaquin)  -CBC: WBC- 9.8  -Continue to observe for SIRS/Sepsis Syndrome.    Prophylaxis:  -DVT prophylaxis with 5000 SubQ Heparin q8h.  -Continue with SCD's b/l while patient is at rest     Disposition:  -Discharge home once patient is medically ready

## 2024-01-16 NOTE — PROGRESS NOTE ADULT - NUTRITIONAL ASSESSMENT
This patient has been assessed with a concern for Malnutrition and has been determined to have a diagnosis/diagnoses of Severe protein-calorie malnutrition.    This patient is being managed with:   Diet Regular-  Entered: Jan 9 2024 10:01AM  
This patient has been assessed with a concern for Malnutrition and has been determined to have a diagnosis/diagnoses of Severe protein-calorie malnutrition.    This patient is being managed with:   Diet Regular-  Entered: Jan 9 2024 10:01AM  
This patient has been assessed with a concern for Malnutrition and has been determined to have a diagnosis/diagnoses of Severe protein-calorie malnutrition.    This patient is being managed with:   Diet Regular-  Entered: Jan 9 2024 10:01AM    This patient has been assessed with a concern for Malnutrition and has been determined to have a diagnosis/diagnoses of Severe protein-calorie malnutrition.    This patient is being managed with:   Diet Regular-  Entered: Jan 9 2024 10:01AM  
This patient has been assessed with a concern for Malnutrition and has been determined to have a diagnosis/diagnoses of Severe protein-calorie malnutrition.    This patient is being managed with:   Diet Regular-  Entered: Jan 9 2024 10:01AM  

## 2024-01-16 NOTE — CHART NOTE - NSCHARTNOTEFT_GEN_A_CORE
Admitting Diagnosis:   Patient is a 73y old  Male who presents with a chief complaint of elective surgery :  Right Video-Assisted Thoracoscopic Surgery, Robotic Assisted, Right pleural biopsy, Pleural effusion drainage, possible pleurodesis and PleurX placement (15 Edgardo 2024 14:28)    PAST MEDICAL & SURGICAL HISTORY:  HTN (hypertension)  Renal stones  Gout  Proteinuria  Hyperlipidemia  Stage 3 chronic kidney disease  BPH (benign prostatic hyperplasia)  Pleural effusion  right  History of shingles  10/2023  No significant past surgical history    Current Nutrition Order:  Regular     PO Intake: Good (%) [ x ]  Fair (50-75%) [   ] Poor (<25%) [   ]    GI Issues:   Pt denies nausea/vomiting/diarrhea/constipation at this time  Reports last BM yesterday 1/15  No abdominal distension/discomfort noted     Pain:  No pain reported     Skin Integrity:  No pressure injuries or edema documented, Humberto score 20    Labs:       139  |  110<H>  |  39<H>  ----------------------------<  91  4.0   |  20<L>  |  1.86<H>    Ca    9.3      2024 05:30  Mg     1.7         TPro  5.6<L>  /  Alb  2.6<L>  /  TBili  0.2  /  DBili  x   /  AST  26  /  ALT  33  /  AlkPhos  74      Medications:  MEDICATIONS  (STANDING):  atorvastatin 10 milliGRAM(s) Oral at bedtime  bisacodyl 5 milliGRAM(s) Oral at bedtime  famotidine    Tablet 10 milliGRAM(s) Oral daily  heparin   Injectable 5000 Unit(s) SubCutaneous every 8 hours  hydrALAZINE 25 milliGRAM(s) Oral every 8 hours  isoniazid 300 milliGRAM(s) Oral daily  labetalol 200 milliGRAM(s) Oral every 8 hours  lactated ringers. 1000 milliLiter(s) (50 mL/Hr) IV Continuous <Continuous>  levoFLOXacin  Tablet 750 milliGRAM(s) Oral every 48 hours  polyethylene glycol 3350 17 Gram(s) Oral daily  predniSONE   Tablet 60 milliGRAM(s) Oral daily  pyrazinamide 1500 milliGRAM(s) Oral <User Schedule>  pyridoxine 50 milliGRAM(s) Oral daily  rifAMPin 600 milliGRAM(s) Oral daily  senna 2 Tablet(s) Oral at bedtime  sodium bicarbonate 325 milliGRAM(s) Oral two times a day    MEDICATIONS  (PRN):  acetaminophen     Tablet .. 975 milliGRAM(s) Oral every 6 hours PRN Mild Pain (1 - 3)  oxyCODONE    IR 5 milliGRAM(s) Oral every 4 hours PRN Moderate Pain (4 - 6)    Anthropometrics:  Height: 5'5"  Weight: 153lb/69.4kg  BMI: 25.5  IBW: 136lb/61.8kg    113% IBW    Weight Change:   No new weights obtained since admission. Recommend nursing to trend weights weekly. RD to continue monitoring weights as able.     Nutrition Focused Physical Exam:   Completed 1/10, see nutrition risk notification. \    Estimated energy needs:   Calories: 25-30kcal/k-2082kcal/d  Protein: 1.1-1.3g/k-90g/d  Defer fluids to team.   Estimated needs based on dosing wt as within % IBW 136lb/61.8kg (113%). Needs adjusted for age, CKD, status post OR, and malnutrition.     Subjective:  73 year old M, Cantonese speaking, with PMHx of HTN, HLD, who is referred by Dr. Edward Jensen for an initial evaluation of pleural effusion. Pt underwent R VATS, pleural biopsy decortication, on  with Dr. Kaiser. Intraop path revealed necrotizing granuloma. ID consulted for TB workup and management. POD 7, renal concerned that rifampin could be contributing to NARA, but per ID, rifampin is a crucial medication in the management of his TB, and give that Cr was elevated before pt was started on rifampin, suspicion is low for rifampin causing AIN from ID perspective. Will keep pt on rifampin for the time being and continue to monitor. Maintaining pigtail to suction and wound vac to blow hole. POD 9, CT chest completed showing extensive subq emphysema and R small apical PTX. POD 10 subq emphysema greatly improved. CXR shows pigtail likely dislodged outside of chest wall. Plan for waterseal at midnight.     Pt seen on 9LA for follow-up. Labs and medication orders reviewed. Ordered for lactated ringers, sodium bicarbonate, prednisone, vitamin B6. Electrolytes WNL, BUN/Cr 39/1.86 <H>. On Regular diet. Pt and wife at bedside speak Cantonese, RD offered  and pt/wife declined, report prefer for daughter to translate via phone. Family report pt's appetite and intake has improved, note pt does not like food in-house and is primarily consuming food brought in from home.     Previous Nutrition Diagnosis:  Severe protein-calorie malnutrition related to inadequate intake as evidenced by intake <75% needs x5 months, 15% wt loss in <6 months, and mild-moderate wasting.    Active [ x ]  Resolved [   ]    Goal:  Consistently meet >75% nutrient needs. Reduce signs and symptoms of protein-calorie malnutrition.     Recommendations:  1. Continue Regular diet, recommend ADD Ensure Max (150kcal, 30g protein) x1/day to promote intake.   >>Encourage & monitor PO intake. [place] dietary preferences as able.   2. Monitor GI tolerance, weight trends, labs, & skin integrity.  3. Defer bowel and pain regimens to team.   4. RD to remain available for diet education/intervention prn.     Education:   Educated on nutrition to promote blood glucose control and general healthful nutrition therapy including mindful intake of carbohydrates, prioritizing high fiber carbohydrates, emphasizing lean proteins, decreasing intake of saturated fat and sodium, and promoting unsaturated fats. Pt and family expressed understanding, aware RD remains available for questions/concerns prn.     Risk Level: High [ x ] Moderate [   ] Low [   ] Admitting Diagnosis:   Patient is a 73y old  Male who presents with a chief complaint of elective surgery :  Right Video-Assisted Thoracoscopic Surgery, Robotic Assisted, Right pleural biopsy, Pleural effusion drainage, possible pleurodesis and PleurX placement (15 Edgardo 2024 14:28)    PAST MEDICAL & SURGICAL HISTORY:  HTN (hypertension)  Renal stones  Gout  Proteinuria  Hyperlipidemia  Stage 3 chronic kidney disease  BPH (benign prostatic hyperplasia)  Pleural effusion  right  History of shingles  10/2023  No significant past surgical history    Current Nutrition Order:  Regular     PO Intake: Good (%) [ x ]  Fair (50-75%) [   ] Poor (<25%) [   ]    GI Issues:   Pt denies nausea/vomiting/diarrhea/constipation at this time  Reports last BM yesterday 1/15  No abdominal distension/discomfort noted     Pain:  No pain reported     Skin Integrity:  No pressure injuries or edema documented, Humberto score 20    Labs:       139  |  110<H>  |  39<H>  ----------------------------<  91  4.0   |  20<L>  |  1.86<H>    Ca    9.3      2024 05:30  Mg     1.7         TPro  5.6<L>  /  Alb  2.6<L>  /  TBili  0.2  /  DBili  x   /  AST  26  /  ALT  33  /  AlkPhos  74      Medications:  MEDICATIONS  (STANDING):  atorvastatin 10 milliGRAM(s) Oral at bedtime  bisacodyl 5 milliGRAM(s) Oral at bedtime  famotidine    Tablet 10 milliGRAM(s) Oral daily  heparin   Injectable 5000 Unit(s) SubCutaneous every 8 hours  hydrALAZINE 25 milliGRAM(s) Oral every 8 hours  isoniazid 300 milliGRAM(s) Oral daily  labetalol 200 milliGRAM(s) Oral every 8 hours  lactated ringers. 1000 milliLiter(s) (50 mL/Hr) IV Continuous <Continuous>  levoFLOXacin  Tablet 750 milliGRAM(s) Oral every 48 hours  polyethylene glycol 3350 17 Gram(s) Oral daily  predniSONE   Tablet 60 milliGRAM(s) Oral daily  pyrazinamide 1500 milliGRAM(s) Oral <User Schedule>  pyridoxine 50 milliGRAM(s) Oral daily  rifAMPin 600 milliGRAM(s) Oral daily  senna 2 Tablet(s) Oral at bedtime  sodium bicarbonate 325 milliGRAM(s) Oral two times a day    MEDICATIONS  (PRN):  acetaminophen     Tablet .. 975 milliGRAM(s) Oral every 6 hours PRN Mild Pain (1 - 3)  oxyCODONE    IR 5 milliGRAM(s) Oral every 4 hours PRN Moderate Pain (4 - 6)    Anthropometrics:  Height: 5'5"  Weight: 153lb/69.4kg  BMI: 25.5  IBW: 136lb/61.8kg    113% IBW    Weight Change:   No new weights obtained since admission. Recommend nursing to trend weights weekly. RD to continue monitoring weights as able.     Nutrition Focused Physical Exam:   Completed 1/10, see nutrition risk notification. \    Estimated energy needs:   Calories: 25-30kcal/k-2082kcal/d  Protein: 1.1-1.3g/k-90g/d  Defer fluids to team.   Estimated needs based on dosing wt as within % IBW 136lb/61.8kg (113%). Needs adjusted for age, CKD, status post OR, and malnutrition.     Subjective:  73 year old M, Cantonese speaking, with PMHx of HTN, HLD, who is referred by Dr. Edward Jensen for an initial evaluation of pleural effusion. Pt underwent R VATS, pleural biopsy decortication, on  with Dr. Kaiser. Intraop path revealed necrotizing granuloma. ID consulted for TB workup and management. POD 7, renal concerned that rifampin could be contributing to NARA, but per ID, rifampin is a crucial medication in the management of his TB, and give that Cr was elevated before pt was started on rifampin, suspicion is low for rifampin causing AIN from ID perspective. Will keep pt on rifampin for the time being and continue to monitor. Maintaining pigtail to suction and wound vac to blow hole. POD 9, CT chest completed showing extensive subq emphysema and R small apical PTX. POD 10 subq emphysema greatly improved. CXR shows pigtail likely dislodged outside of chest wall. Plan for waterseal at midnight.     Pt seen on 9LA for follow-up. Labs and medication orders reviewed. Ordered for lactated ringers, sodium bicarbonate, prednisone, vitamin B6. Electrolytes WNL, BUN/Cr 39/1.86 <H>. On Regular diet. Pt and wife at bedside speak Cantonese, RD offered  and pt/wife declined, report prefer for daughter to translate via phone. Family report pt's appetite and intake has improved, note pt does not like food in-house and is primarily consuming food brought in from home.     Previous Nutrition Diagnosis:  Severe protein-calorie malnutrition related to inadequate intake as evidenced by intake <75% needs x5 months, 15% wt loss in <6 months, and mild-moderate wasting.    Active [ x ]  Resolved [   ]    Goal:  Consistently meet >75% nutrient needs. Reduce signs and symptoms of protein-calorie malnutrition.     Recommendations:  1. Continue Regular diet, recommend ADD Ensure Max (150kcal, 30g protein) x1/day to promote intake.   >>Encourage & monitor PO intake. [place] dietary preferences as able.   2. Monitor GI tolerance, weight trends, labs, & skin integrity.  3. Defer bowel and pain regimens to team.   4. RD to remain available for diet education/intervention prn.     Education:   Educated on nutrition to promote blood glucose control and general healthful nutrition therapy including mindful intake of carbohydrates, prioritizing high fiber carbohydrates, emphasizing lean proteins, decreasing intake of saturated fat and sodium, and promoting unsaturated fats. Pt and family expressed understanding, aware RD remains available for questions/concerns prn.     Risk Level: High [ x ] Moderate [   ] Low [   ] Admitting Diagnosis:   Patient is a 73y old  Male who presents with a chief complaint of elective surgery :  Right Video-Assisted Thoracoscopic Surgery, Robotic Assisted, Right pleural biopsy, Pleural effusion drainage, possible pleurodesis and PleurX placement (15 Edgardo 2024 14:28)    PAST MEDICAL & SURGICAL HISTORY:  HTN (hypertension)  Renal stones  Gout  Proteinuria  Hyperlipidemia  Stage 3 chronic kidney disease  BPH (benign prostatic hyperplasia)  Pleural effusion  right  History of shingles  10/2023  No significant past surgical history    Current Nutrition Order:  Regular     PO Intake: Good (%) [ x ]  Fair (50-75%) [   ] Poor (<25%) [   ]    GI Issues:   Pt denies nausea/vomiting/diarrhea/constipation at this time  Reports last BM yesterday 1/15  No abdominal distension/discomfort noted     Pain:  No pain reported     Skin Integrity:  No pressure injuries or edema documented, Humberto score 20    Labs:       139  |  110<H>  |  39<H>  ----------------------------<  91  4.0   |  20<L>  |  1.86<H>    Ca    9.3      2024 05:30  Mg     1.7         TPro  5.6<L>  /  Alb  2.6<L>  /  TBili  0.2  /  DBili  x   /  AST  26  /  ALT  33  /  AlkPhos  74      Medications:  MEDICATIONS  (STANDING):  atorvastatin 10 milliGRAM(s) Oral at bedtime  bisacodyl 5 milliGRAM(s) Oral at bedtime  famotidine    Tablet 10 milliGRAM(s) Oral daily  heparin   Injectable 5000 Unit(s) SubCutaneous every 8 hours  hydrALAZINE 25 milliGRAM(s) Oral every 8 hours  isoniazid 300 milliGRAM(s) Oral daily  labetalol 200 milliGRAM(s) Oral every 8 hours  lactated ringers. 1000 milliLiter(s) (50 mL/Hr) IV Continuous <Continuous>  levoFLOXacin  Tablet 750 milliGRAM(s) Oral every 48 hours  polyethylene glycol 3350 17 Gram(s) Oral daily  predniSONE   Tablet 60 milliGRAM(s) Oral daily  pyrazinamide 1500 milliGRAM(s) Oral <User Schedule>  pyridoxine 50 milliGRAM(s) Oral daily  rifAMPin 600 milliGRAM(s) Oral daily  senna 2 Tablet(s) Oral at bedtime  sodium bicarbonate 325 milliGRAM(s) Oral two times a day    MEDICATIONS  (PRN):  acetaminophen     Tablet .. 975 milliGRAM(s) Oral every 6 hours PRN Mild Pain (1 - 3)  oxyCODONE    IR 5 milliGRAM(s) Oral every 4 hours PRN Moderate Pain (4 - 6)    Anthropometrics:  Height: 5'5"  Weight: 153lb/69.4kg  BMI: 25.5  IBW: 136lb/61.8kg    113% IBW    Weight Change:   No new weights obtained since admission. Recommend nursing to trend weights weekly. RD to continue monitoring weights as able.     Nutrition Focused Physical Exam:   Completed 1/10, see nutrition risk notification. \    Estimated energy needs:   Calories: 25-30kcal/k-2082kcal/d  Protein: 1.1-1.3g/k-90g/d  Defer fluids to team.   Estimated needs based on dosing wt as within % IBW 136lb/61.8kg (113%). Needs adjusted for age, CKD, status post OR, and malnutrition.     Subjective:  73 year old M, Cantonese speaking, with PMHx of HTN, HLD, who is referred by Dr. Edward Jensen for an initial evaluation of pleural effusion. Pt underwent R VATS, pleural biopsy decortication, on  with Dr. Kaiser. Intraop path revealed necrotizing granuloma. ID consulted for TB workup and management. POD 7, renal concerned that rifampin could be contributing to NARA, but per ID, rifampin is a crucial medication in the management of his TB, and give that Cr was elevated before pt was started on rifampin, suspicion is low for rifampin causing AIN from ID perspective. Will keep pt on rifampin for the time being and continue to monitor. Maintaining pigtail to suction and wound vac to blow hole. POD 9, CT chest completed showing extensive subq emphysema and R small apical PTX. POD 10 subq emphysema greatly improved. CXR shows pigtail likely dislodged outside of chest wall. Plan for waterseal at midnight.     Pt seen on 9LA for follow-up. Labs and medication orders reviewed. Ordered for lactated ringers, sodium bicarbonate, prednisone, vitamin B6. Electrolytes WNL, BUN/Cr 39/1.86 <H>. On Regular diet. Pt and wife at bedside speak Cantonese, RD offered  and pt/wife declined, report prefer for daughter to translate via phone. Family report pt's appetite and intake has improved, note pt does not like food in-house and is primarily consuming food brought in from home. Daughter reports pt with irregular BMs during admission now normalized - bowel regimen ordered. Pt denies difficulty chewing/swallowing. Family request low sugar oral nutrition supplement, recommended Ensure Max and family receptive - communicated to team. See nutrition recommendations. RD to remain available.     Previous Nutrition Diagnosis:  Severe protein-calorie malnutrition related to inadequate intake as evidenced by intake <75% needs x5 months, 15% wt loss in <6 months, and mild-moderate wasting.    Active [ x ]  Resolved [   ]    Goal:  Consistently meet >75% nutrient needs. Reduce signs and symptoms of protein-calorie malnutrition.     Recommendations:  1. Continue Regular diet, recommend ADD Ensure Max (150kcal, 30g protein) x1/day to promote intake.   >>Encourage & monitor PO intake. [place] dietary preferences as able.   2. Monitor GI tolerance, weight trends, labs, & skin integrity.  3. Defer bowel and pain regimens to team.   4. RD to remain available for diet education/intervention prn.     Education:   Educated on nutrition to promote blood glucose control and general healthful nutrition therapy per family request. Discussed mindful intake of carbohydrates, prioritizing high fiber carbohydrates, emphasizing lean proteins, decreasing intake of saturated fat and sodium, and promoting unsaturated fats. RD answered all family questions. Pt and family expressed understanding, aware RD remains available for questions/concerns prn.     Risk Level: High [ x ] Moderate [   ] Low [   ] Admitting Diagnosis:   Patient is a 73y old  Male who presents with a chief complaint of elective surgery :  Right Video-Assisted Thoracoscopic Surgery, Robotic Assisted, Right pleural biopsy, Pleural effusion drainage, possible pleurodesis and PleurX placement (15 Edgardo 2024 14:28)    PAST MEDICAL & SURGICAL HISTORY:  HTN (hypertension)  Renal stones  Gout  Proteinuria  Hyperlipidemia  Stage 3 chronic kidney disease  BPH (benign prostatic hyperplasia)  Pleural effusion  right  History of shingles  10/2023  No significant past surgical history    Current Nutrition Order:  Regular     PO Intake: Good (%) [ x ]  Fair (50-75%) [   ] Poor (<25%) [   ]    GI Issues:   Pt denies nausea/vomiting/diarrhea/constipation at this time  Reports last BM yesterday 1/15  No abdominal distension/discomfort noted     Pain:  No pain reported     Skin Integrity:  No pressure injuries or edema documented, Humberto score 20    Labs:       139  |  110<H>  |  39<H>  ----------------------------<  91  4.0   |  20<L>  |  1.86<H>    Ca    9.3      2024 05:30  Mg     1.7         TPro  5.6<L>  /  Alb  2.6<L>  /  TBili  0.2  /  DBili  x   /  AST  26  /  ALT  33  /  AlkPhos  74      Medications:  MEDICATIONS  (STANDING):  atorvastatin 10 milliGRAM(s) Oral at bedtime  bisacodyl 5 milliGRAM(s) Oral at bedtime  famotidine    Tablet 10 milliGRAM(s) Oral daily  heparin   Injectable 5000 Unit(s) SubCutaneous every 8 hours  hydrALAZINE 25 milliGRAM(s) Oral every 8 hours  isoniazid 300 milliGRAM(s) Oral daily  labetalol 200 milliGRAM(s) Oral every 8 hours  lactated ringers. 1000 milliLiter(s) (50 mL/Hr) IV Continuous <Continuous>  levoFLOXacin  Tablet 750 milliGRAM(s) Oral every 48 hours  polyethylene glycol 3350 17 Gram(s) Oral daily  predniSONE   Tablet 60 milliGRAM(s) Oral daily  pyrazinamide 1500 milliGRAM(s) Oral <User Schedule>  pyridoxine 50 milliGRAM(s) Oral daily  rifAMPin 600 milliGRAM(s) Oral daily  senna 2 Tablet(s) Oral at bedtime  sodium bicarbonate 325 milliGRAM(s) Oral two times a day    MEDICATIONS  (PRN):  acetaminophen     Tablet .. 975 milliGRAM(s) Oral every 6 hours PRN Mild Pain (1 - 3)  oxyCODONE    IR 5 milliGRAM(s) Oral every 4 hours PRN Moderate Pain (4 - 6)    Anthropometrics:  Height: 5'5"  Weight: 153lb/69.4kg  BMI: 25.5  IBW: 136lb/61.8kg    113% IBW    Weight Change:   No new weights obtained since admission. Recommend nursing to trend weights weekly. RD to continue monitoring weights as able.     Nutrition Focused Physical Exam:   Completed 1/10, see nutrition risk notification. \    Estimated energy needs:   Calories: 25-30kcal/k-2082kcal/d  Protein: 1.1-1.3g/k-90g/d  Defer fluids to team.   Estimated needs based on dosing wt as within % IBW 136lb/61.8kg (113%). Needs adjusted for age, CKD, status post OR, and malnutrition.     Subjective:  73 year old M, Cantonese speaking, with PMHx of HTN, HLD, who is referred by Dr. Edward Jensen for an initial evaluation of pleural effusion. Pt underwent R VATS, pleural biopsy decortication, on  with Dr. Kaiser. Intraop path revealed necrotizing granuloma. ID consulted for TB workup and management. POD 7, renal concerned that rifampin could be contributing to NARA, but per ID, rifampin is a crucial medication in the management of his TB, and give that Cr was elevated before pt was started on rifampin, suspicion is low for rifampin causing AIN from ID perspective. Will keep pt on rifampin for the time being and continue to monitor. Maintaining pigtail to suction and wound vac to blow hole. POD 9, CT chest completed showing extensive subq emphysema and R small apical PTX. POD 10 subq emphysema greatly improved. CXR shows pigtail likely dislodged outside of chest wall. Plan for waterseal at midnight.     Pt seen on 9LA for follow-up. Labs and medication orders reviewed. Ordered for lactated ringers, sodium bicarbonate, prednisone, vitamin B6. Electrolytes WNL, BUN/Cr 39/1.86 <H>. On Regular diet. Pt and wife at bedside speak Cantonese, RD offered  and pt/wife declined, report prefer for daughter to translate via phone. Family report pt's appetite and intake has improved, note pt does not like food in-house and is primarily consuming food brought in from home. Daughter reports pt with irregular BMs during admission now normalized - bowel regimen ordered. Pt denies difficulty chewing/swallowing. Family request low sugar oral nutrition supplement, recommended Ensure Max and family receptive - communicated to team. See nutrition recommendations. RD to remain available.     Previous Nutrition Diagnosis:  Severe protein-calorie malnutrition related to inadequate intake as evidenced by intake <75% needs x5 months, 15% wt loss in <6 months, and mild-moderate wasting.    Active [ x ]  Resolved [   ]    Goal:  Consistently meet >75% nutrient needs. Reduce signs and symptoms of protein-calorie malnutrition.     Recommendations:  1. Continue Regular diet, recommend ADD Ensure Max (150kcal, 30g protein) x1/day to promote intake.   >>Encourage & monitor PO intake. [place] dietary preferences as able.   2. Monitor GI tolerance, weight trends, labs, & skin integrity.  3. Defer bowel and pain regimens to team.   4. RD to remain available for diet education/intervention prn.     Education:   Educated on nutrition to promote blood glucose control and general healthful nutrition therapy per family request. Discussed mindful intake of carbohydrates, prioritizing high fiber carbohydrates, emphasizing lean proteins, decreasing intake of saturated fat and sodium, and promoting unsaturated fats. RD answered all family questions. Pt and family expressed understanding, aware RD remains available for questions/concerns prn.     Risk Level: High [ x ] Moderate [   ] Low [   ]

## 2024-01-16 NOTE — DISCHARGE NOTE PROVIDER - NSDCMRMEDTOKEN_GEN_ALL_CORE_FT
aspirin 81 mg oral delayed release capsule: orally once a day  atorvastatin 10 mg oral tablet: 1 tab(s) orally once a day  dexlansoprazole 60 mg oral delayed release capsule: 1 cap(s) orally once a day  losartan 100 mg oral tablet: 1 tab(s) orally once a day  metoprolol succinate 50 mg oral capsule, extended release: 1 cap(s) orally once a day  Nephplex Rx oral tablet: 1 tab(s) orally once a day  nitroglycerin 0.6 mg sublingual tablet: 1 tab(s) sublingually once a day as needed for  chest pain  Norvasc 5 mg oral tablet: 1 tab(s) orally once a day  sodium bicarbonate 650 mg oral tablet: 1 tab(s) orally once a day  Uloric 40 mg oral tablet: 1 tab(s) orally once a day   acetaminophen 325 mg oral tablet: 3 tab(s) orally every 6 hours As needed Mild Pain (1 - 3)  aspirin 81 mg oral delayed release capsule: orally once a day  atorvastatin 10 mg oral tablet: 1 tab(s) orally once a day  famotidine 10 mg oral tablet: 1 tab(s) orally 2 times a day  hydrALAZINE 25 mg oral tablet: 1 tab(s) orally every 8 hours  isoniazid 300 mg oral tablet: 1 tab(s) orally once a day  labetalol 200 mg oral tablet: 1 tab(s) orally every 8 hours  levoFLOXacin 750 mg oral tablet: 1 tab(s) orally every 48 hours  Nephplex Rx oral tablet: 1 tab(s) orally once a day  nitroglycerin 0.6 mg sublingual tablet: 1 tab(s) sublingually once a day as needed for  chest pain  oxyCODONE 5 mg oral tablet: 1 tab(s) orally every 8 hours as needed for Moderate Pain (4 - 6) MDD: 3  polyethylene glycol 3350 oral powder for reconstitution: 17 gram(s) orally once a day as needed for  constipation  predniSONE 50 mg oral tablet: 1 tab(s) orally once a day  pyrazinamide 500 mg oral tablet: 3 tab(s) orally 3 times a week Monday/Wednesday/Friday  pyridoxine 50 mg oral tablet: 1 tab(s) orally once a day  rifAMPin 300 mg oral capsule: 2 cap(s) orally once a day  senna leaf extract oral tablet: 2 tab(s) orally once a day (at bedtime)  sodium bicarbonate 650 mg oral tablet: 1 tab(s) orally once a day  Uloric 40 mg oral tablet: 1 tab(s) orally once a day   acetaminophen 325 mg oral tablet: 3 tab(s) orally every 6 hours As needed Mild Pain (1 - 3)  aspirin 81 mg oral delayed release capsule: orally once a day  atorvastatin 10 mg oral tablet: 1 tab(s) orally once a day  famotidine 10 mg oral tablet: 1 tab(s) orally 2 times a day  hydrALAZINE 25 mg oral tablet: 1 tab(s) orally every 8 hours  isoniazid 300 mg oral tablet: 1 tab(s) orally once a day  labetalol 200 mg oral tablet: 1 tab(s) orally every 8 hours  levoFLOXacin 750 mg oral tablet: 1 tab(s) orally every 48 hours  Nephplex Rx oral tablet: 1 tab(s) orally once a day  nitroglycerin 0.6 mg sublingual tablet: 1 tab(s) sublingually once a day as needed for  chest pain  oxyCODONE 5 mg oral tablet: 1 tab(s) orally every 8 hours as needed for Moderate Pain (4 - 6) MDD: 3  polyethylene glycol 3350 oral powder for reconstitution: 17 gram(s) orally once a day as needed for  constipation  predniSONE 10 mg oral tablet: 5 tab(s) orally once a day please taper:   50 mg (5 tabs) for 5 days   40 mg (4 tabs) for 5 days  30 mg (3 tabs) for 5 days   20 mg (2 tabs) for 5 days   10 mg (1 tab) for 5 days, and then stop  pyrazinamide 500 mg oral tablet: 3 tab(s) orally 3 times a week Monday/Wednesday/Friday  pyridoxine 50 mg oral tablet: 1 tab(s) orally once a day  rifAMPin 300 mg oral capsule: 2 cap(s) orally once a day  senna leaf extract oral tablet: 2 tab(s) orally once a day (at bedtime)  sodium bicarbonate 650 mg oral tablet: 1 tab(s) orally once a day  Uloric 40 mg oral tablet: 1 tab(s) orally once a day

## 2024-01-16 NOTE — DISCHARGE NOTE PROVIDER - DETAILS OF MALNUTRITION DIAGNOSIS/DIAGNOSES
This patient has been assessed with a concern for Malnutrition and was treated during this hospitalization for the following Nutrition diagnosis/diagnoses:     -  01/10/2024: Severe protein-calorie malnutrition

## 2024-01-16 NOTE — DISCHARGE NOTE PROVIDER - NSDCFUADDINST_GEN_ALL_CORE_FT
-Please change your dressings (wet to dry as instructed to you prior to discharge) every day     -Per the [facility], you are to remain isolated at home until Sunday 1/21. Family who has already been exposed can be with you at home but they must isolate as well.     -Walk daily as tolerated and use your incentive spirometer 10 times every hour while you are awake.     -Please weigh yourself daily. If you notice over a 3 pound weight gain in 3 days, this is a sign you are likely retaining too much fluid. It is imperative you call our right away with unexplained rapid weight gain.      -Please continue to wear the compression stockings given to you in the hospital at home. This is a way to prevent fluid from building up in your legs.     -No driving or strenuous activity/exercise until cleared by your surgeon.    -Gently clean your incisions with unscented/antibacterial soap and water, pat dry.  You may leave them open to air.    -Call your doctor if you have shortness of breath, chest pain not relieved by pain medication, dizziness, fever >100.5, or increased redness or drainage from incisions.

## 2024-01-16 NOTE — DISCHARGE NOTE PROVIDER - NSDCFUADDAPPT_GEN_ALL_CORE_FT
Regarding your follow up appointments, our office will call you with the scheduled dates and times, if you do not receive a call by 1/18 please call [phone].     You must attend your [facility] appointment on 2/6 at 12:30.         Regarding your follow up appointments, our office will call you with the scheduled dates and times, if you do not receive a call by 1/18 please call [phone].     You must attend your [facility] appointment on 2/6 at 12:30.         Regarding your follow up appointments, our office will call you with the scheduled dates and times, if you do not receive a call by 1/18 please call [phone].     The office of Hay Mccarthy requires you to call and schedule an appointment, please call as soon as possible for an appointment in 1 week     You must attend your [facility] appointment on 2/6 at 12:30.          Regarding your follow up appointments, our office will call you with the scheduled dates and times, if you do not receive a call by 1/18 please call [phone].     The office of Hay Mccarthy requires you to call and schedule an appointment, please call as soon as possible for an appointment in 1 week     You must attend your [facility] appointment on 2/6 at 12:30.

## 2024-01-16 NOTE — DISCHARGE NOTE PROVIDER - NSDCCPTREATMENT_GEN_ALL_CORE_FT
PRINCIPAL PROCEDURE  Procedure: Robot-assisted total decortication of lung  Findings and Treatment: R VATS RA Pleural biopsy and decortication

## 2024-01-17 ENCOUNTER — TRANSCRIPTION ENCOUNTER (OUTPATIENT)
Age: 74
End: 2024-01-17

## 2024-01-17 VITALS — TEMPERATURE: 97 F

## 2024-01-17 LAB
ALBUMIN SERPL ELPH-MCNC: 2.7 G/DL — LOW (ref 3.3–5)
ALP SERPL-CCNC: 69 U/L — SIGNIFICANT CHANGE UP (ref 40–120)
ALT FLD-CCNC: 24 U/L — SIGNIFICANT CHANGE UP (ref 10–45)
ANION GAP SERPL CALC-SCNC: 11 MMOL/L — SIGNIFICANT CHANGE UP (ref 5–17)
AST SERPL-CCNC: 30 U/L — SIGNIFICANT CHANGE UP (ref 10–40)
BILIRUB SERPL-MCNC: 0.2 MG/DL — SIGNIFICANT CHANGE UP (ref 0.2–1.2)
BUN SERPL-MCNC: 30 MG/DL — HIGH (ref 7–23)
CALCIUM SERPL-MCNC: 9.3 MG/DL — SIGNIFICANT CHANGE UP (ref 8.4–10.5)
CHLORIDE SERPL-SCNC: 109 MMOL/L — HIGH (ref 96–108)
CO2 SERPL-SCNC: 21 MMOL/L — LOW (ref 22–31)
CREAT SERPL-MCNC: 1.54 MG/DL — HIGH (ref 0.5–1.3)
EGFR: 48 ML/MIN/1.73M2 — LOW
GLUCOSE SERPL-MCNC: 117 MG/DL — HIGH (ref 70–99)
HCT VFR BLD CALC: 28.3 % — LOW (ref 39–50)
HGB BLD-MCNC: 9.2 G/DL — LOW (ref 13–17)
MAGNESIUM SERPL-MCNC: 1.6 MG/DL — SIGNIFICANT CHANGE UP (ref 1.6–2.6)
MCHC RBC-ENTMCNC: 29.9 PG — SIGNIFICANT CHANGE UP (ref 27–34)
MCHC RBC-ENTMCNC: 32.5 GM/DL — SIGNIFICANT CHANGE UP (ref 32–36)
MCV RBC AUTO: 91.9 FL — SIGNIFICANT CHANGE UP (ref 80–100)
NRBC # BLD: 0 /100 WBCS — SIGNIFICANT CHANGE UP (ref 0–0)
PLATELET # BLD AUTO: 308 K/UL — SIGNIFICANT CHANGE UP (ref 150–400)
POTASSIUM SERPL-MCNC: 4.4 MMOL/L — SIGNIFICANT CHANGE UP (ref 3.5–5.3)
POTASSIUM SERPL-SCNC: 4.4 MMOL/L — SIGNIFICANT CHANGE UP (ref 3.5–5.3)
PROT SERPL-MCNC: 6.1 G/DL — SIGNIFICANT CHANGE UP (ref 6–8.3)
RBC # BLD: 3.08 M/UL — LOW (ref 4.2–5.8)
RBC # FLD: 18.4 % — HIGH (ref 10.3–14.5)
SODIUM SERPL-SCNC: 141 MMOL/L — SIGNIFICANT CHANGE UP (ref 135–145)
WBC # BLD: 10.78 K/UL — HIGH (ref 3.8–10.5)
WBC # FLD AUTO: 10.78 K/UL — HIGH (ref 3.8–10.5)

## 2024-01-17 PROCEDURE — 84165 PROTEIN E-PHORESIS SERUM: CPT

## 2024-01-17 PROCEDURE — 87102 FUNGUS ISOLATION CULTURE: CPT

## 2024-01-17 PROCEDURE — 85610 PROTHROMBIN TIME: CPT

## 2024-01-17 PROCEDURE — 85027 COMPLETE CBC AUTOMATED: CPT

## 2024-01-17 PROCEDURE — 76770 US EXAM ABDO BACK WALL COMP: CPT

## 2024-01-17 PROCEDURE — P9045: CPT

## 2024-01-17 PROCEDURE — 82570 ASSAY OF URINE CREATININE: CPT

## 2024-01-17 PROCEDURE — 93010 ELECTROCARDIOGRAM REPORT: CPT

## 2024-01-17 PROCEDURE — P9016: CPT

## 2024-01-17 PROCEDURE — 99232 SBSQ HOSP IP/OBS MODERATE 35: CPT

## 2024-01-17 PROCEDURE — 85025 COMPLETE CBC W/AUTO DIFF WBC: CPT

## 2024-01-17 PROCEDURE — 81001 URINALYSIS AUTO W/SCOPE: CPT

## 2024-01-17 PROCEDURE — 88305 TISSUE EXAM BY PATHOLOGIST: CPT

## 2024-01-17 PROCEDURE — 71045 X-RAY EXAM CHEST 1 VIEW: CPT

## 2024-01-17 PROCEDURE — C9399: CPT

## 2024-01-17 PROCEDURE — 83935 ASSAY OF URINE OSMOLALITY: CPT

## 2024-01-17 PROCEDURE — 87206 SMEAR FLUORESCENT/ACID STAI: CPT

## 2024-01-17 PROCEDURE — 84443 ASSAY THYROID STIM HORMONE: CPT

## 2024-01-17 PROCEDURE — 84133 ASSAY OF URINE POTASSIUM: CPT

## 2024-01-17 PROCEDURE — 87015 SPECIMEN INFECT AGNT CONCNTJ: CPT

## 2024-01-17 PROCEDURE — S2900: CPT

## 2024-01-17 PROCEDURE — 71250 CT THORAX DX C-: CPT

## 2024-01-17 PROCEDURE — 88112 CYTOPATH CELL ENHANCE TECH: CPT

## 2024-01-17 PROCEDURE — 86803 HEPATITIS C AB TEST: CPT

## 2024-01-17 PROCEDURE — 83970 ASSAY OF PARATHORMONE: CPT

## 2024-01-17 PROCEDURE — 87205 SMEAR GRAM STAIN: CPT

## 2024-01-17 PROCEDURE — 87070 CULTURE OTHR SPECIMN AEROBIC: CPT

## 2024-01-17 PROCEDURE — 84156 ASSAY OF PROTEIN URINE: CPT

## 2024-01-17 PROCEDURE — 83521 IG LIGHT CHAINS FREE EACH: CPT

## 2024-01-17 PROCEDURE — 93005 ELECTROCARDIOGRAM TRACING: CPT

## 2024-01-17 PROCEDURE — 81003 URINALYSIS AUTO W/O SCOPE: CPT

## 2024-01-17 PROCEDURE — 84166 PROTEIN E-PHORESIS/URINE/CSF: CPT

## 2024-01-17 PROCEDURE — 87556 M.TUBERCULO DNA AMP PROBE: CPT

## 2024-01-17 PROCEDURE — 71045 X-RAY EXAM CHEST 1 VIEW: CPT | Mod: 26

## 2024-01-17 PROCEDURE — 87798 DETECT AGENT NOS DNA AMP: CPT

## 2024-01-17 PROCEDURE — 82310 ASSAY OF CALCIUM: CPT

## 2024-01-17 PROCEDURE — 80048 BASIC METABOLIC PNL TOTAL CA: CPT

## 2024-01-17 PROCEDURE — 86900 BLOOD TYPING SEROLOGIC ABO: CPT

## 2024-01-17 PROCEDURE — 88331 PATH CONSLTJ SURG 1 BLK 1SPC: CPT

## 2024-01-17 PROCEDURE — 86036 ANCA SCREEN EACH ANTIBODY: CPT

## 2024-01-17 PROCEDURE — 82652 VIT D 1 25-DIHYDROXY: CPT

## 2024-01-17 PROCEDURE — 87075 CULTR BACTERIA EXCEPT BLOOD: CPT

## 2024-01-17 PROCEDURE — 86850 RBC ANTIBODY SCREEN: CPT

## 2024-01-17 PROCEDURE — 84540 ASSAY OF URINE/UREA-N: CPT

## 2024-01-17 PROCEDURE — 84155 ASSAY OF PROTEIN SERUM: CPT

## 2024-01-17 PROCEDURE — 87449 NOS EACH ORGANISM AG IA: CPT

## 2024-01-17 PROCEDURE — 83036 HEMOGLOBIN GLYCOSYLATED A1C: CPT

## 2024-01-17 PROCEDURE — 87385 HISTOPLASMA CAPSUL AG IA: CPT

## 2024-01-17 PROCEDURE — 83735 ASSAY OF MAGNESIUM: CPT

## 2024-01-17 PROCEDURE — 84300 ASSAY OF URINE SODIUM: CPT

## 2024-01-17 PROCEDURE — 87116 MYCOBACTERIA CULTURE: CPT

## 2024-01-17 PROCEDURE — 86334 IMMUNOFIX E-PHORESIS SERUM: CPT

## 2024-01-17 PROCEDURE — 83519 RIA NONANTIBODY: CPT

## 2024-01-17 PROCEDURE — 36430 TRANSFUSION BLD/BLD COMPNT: CPT

## 2024-01-17 PROCEDURE — 82306 VITAMIN D 25 HYDROXY: CPT

## 2024-01-17 PROCEDURE — 36415 COLL VENOUS BLD VENIPUNCTURE: CPT

## 2024-01-17 PROCEDURE — 80053 COMPREHEN METABOLIC PANEL: CPT

## 2024-01-17 PROCEDURE — C1889: CPT

## 2024-01-17 PROCEDURE — 86901 BLOOD TYPING SEROLOGIC RH(D): CPT

## 2024-01-17 PROCEDURE — 86923 COMPATIBILITY TEST ELECTRIC: CPT

## 2024-01-17 PROCEDURE — 82962 GLUCOSE BLOOD TEST: CPT

## 2024-01-17 PROCEDURE — 84100 ASSAY OF PHOSPHORUS: CPT

## 2024-01-17 PROCEDURE — 82340 ASSAY OF CALCIUM IN URINE: CPT

## 2024-01-17 PROCEDURE — 85730 THROMBOPLASTIN TIME PARTIAL: CPT

## 2024-01-17 RX ORDER — LOSARTAN POTASSIUM 100 MG/1
1 TABLET, FILM COATED ORAL
Refills: 0 | DISCHARGE

## 2024-01-17 RX ORDER — HYDRALAZINE HCL 50 MG
1 TABLET ORAL
Qty: 90 | Refills: 0
Start: 2024-01-17 | End: 2024-02-15

## 2024-01-17 RX ORDER — HEXAVITAMINS
1 TABLET ORAL
Qty: 30 | Refills: 0
Start: 2024-01-17 | End: 2024-02-15

## 2024-01-17 RX ORDER — DEXLANSOPRAZOLE 30 MG/1
1 CAPSULE, DELAYED RELEASE ORAL
Refills: 0 | DISCHARGE

## 2024-01-17 RX ORDER — LABETALOL HCL 100 MG
1 TABLET ORAL
Qty: 90 | Refills: 0
Start: 2024-01-17 | End: 2024-02-15

## 2024-01-17 RX ORDER — METOPROLOL TARTRATE 50 MG
1 TABLET ORAL
Refills: 0 | DISCHARGE

## 2024-01-17 RX ORDER — POLYETHYLENE GLYCOL 3350 17 G/17G
17 POWDER, FOR SOLUTION ORAL
Qty: 510 | Refills: 0
Start: 2024-01-17 | End: 2024-02-15

## 2024-01-17 RX ORDER — SENNA PLUS 8.6 MG/1
2 TABLET ORAL
Qty: 60 | Refills: 0
Start: 2024-01-17 | End: 2024-02-15

## 2024-01-17 RX ORDER — OXYCODONE HYDROCHLORIDE 5 MG/1
1 TABLET ORAL
Qty: 21 | Refills: 0
Start: 2024-01-17 | End: 2024-01-23

## 2024-01-17 RX ORDER — ACETAMINOPHEN 500 MG
3 TABLET ORAL
Qty: 0 | Refills: 0 | DISCHARGE
Start: 2024-01-17

## 2024-01-17 RX ORDER — RIFAMPIN 300 MG
2 CAPSULE ORAL
Qty: 60 | Refills: 0
Start: 2024-01-17 | End: 2024-02-15

## 2024-01-17 RX ORDER — PYRAZINAMIDE 500 MG/1
3 TABLET ORAL
Qty: 39 | Refills: 0
Start: 2024-01-17 | End: 2024-02-15

## 2024-01-17 RX ORDER — LEVOFLOXACIN 5 MG/ML
1 INJECTION, SOLUTION INTRAVENOUS
Qty: 15 | Refills: 0
Start: 2024-01-17 | End: 2024-02-15

## 2024-01-17 RX ORDER — FAMOTIDINE 10 MG/ML
1 INJECTION INTRAVENOUS
Qty: 60 | Refills: 0
Start: 2024-01-17 | End: 2024-02-15

## 2024-01-17 RX ORDER — MAGNESIUM OXIDE 400 MG ORAL TABLET 241.3 MG
800 TABLET ORAL ONCE
Refills: 0 | Status: COMPLETED | OUTPATIENT
Start: 2024-01-17 | End: 2024-01-17

## 2024-01-17 RX ORDER — AMLODIPINE BESYLATE 2.5 MG/1
1 TABLET ORAL
Refills: 0 | DISCHARGE

## 2024-01-17 RX ORDER — PYRIDOXINE HCL (VITAMIN B6) 100 MG
1 TABLET ORAL
Qty: 30 | Refills: 0
Start: 2024-01-17 | End: 2024-02-15

## 2024-01-17 RX ADMIN — MAGNESIUM OXIDE 400 MG ORAL TABLET 800 MILLIGRAM(S): 241.3 TABLET ORAL at 11:35

## 2024-01-17 RX ADMIN — Medication 300 MILLIGRAM(S): at 11:35

## 2024-01-17 RX ADMIN — Medication 975 MILLIGRAM(S): at 00:26

## 2024-01-17 RX ADMIN — Medication 50 MILLIGRAM(S): at 11:35

## 2024-01-17 RX ADMIN — Medication 50 MILLIGRAM(S): at 05:17

## 2024-01-17 RX ADMIN — FAMOTIDINE 10 MILLIGRAM(S): 10 INJECTION INTRAVENOUS at 11:35

## 2024-01-17 RX ADMIN — HEPARIN SODIUM 5000 UNIT(S): 5000 INJECTION INTRAVENOUS; SUBCUTANEOUS at 05:16

## 2024-01-17 RX ADMIN — PYRAZINAMIDE 1500 MILLIGRAM(S): 500 TABLET ORAL at 05:17

## 2024-01-17 RX ADMIN — Medication 975 MILLIGRAM(S): at 05:26

## 2024-01-17 RX ADMIN — Medication 25 MILLIGRAM(S): at 07:28

## 2024-01-17 RX ADMIN — Medication 325 MILLIGRAM(S): at 05:21

## 2024-01-17 RX ADMIN — Medication 25 MILLIGRAM(S): at 00:05

## 2024-01-17 RX ADMIN — Medication 200 MILLIGRAM(S): at 05:16

## 2024-01-17 NOTE — PROGRESS NOTE ADULT - ASSESSMENT
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 Hospital course c/b NARA non oliguric initially thought to be prerenal/ATN with partial response to IVF down to 2.1 ranges. However, gradual uptrend noted starting on 1/8  (2.23-2.0-2.1) w/ highest SCr noted on 1/11 at 2.43 in addition to peripheral Eosinophilia 13.3% on 1/10 suggesting additional renal insult such as AIN drug induced     1. Non oliguric NARA sec to prerenal/ATN vs possible AIN - initial renal insult attributed to ATN/pre renal etiology which responded to IVF trial w/ SCr down to 2.1 ranges  Gradual uptrend noted on renal function up to 2.43 ranges in the setting of normal renal US, stable hemodynamics, bland UA and peripheral Eosinophilia suggesting acute interstitial nephritis (drug induced) possibly related to rifampin use (started on 1/6)  Empirically started on prednisone 60mg because of these findings on 1/11. Has received 5 days so far with most recent SCr at 1.8 ranges  Discussed w/ ID and since Rifampin is a crucial anti-tb medication, stopping this medication will lead to poor clinical outcomes, it was decided to c/w this agent in addition to PO steroids.   Will c/w prednisone 50mg PO daily in anticipation of slow taper   prednisone taper will be needed upon disharge starting at prednisone 50qd and tapering down 10 mg every 5 days   strict I/O for monitoring   c/w PUD ppx w/ H2 antagonist   Adjust and dose all medications to current GFR  Work up for other potential disease mimics unremarkable   Trend BMP for additional renal function monitoring   Treatment for AIN-DI is usually between 4-6 weeks  Outpatient Nephrology follow up     2. Hx of HTN -  c/w hydralazine 25mg TID and Labetalol 100mg PO BID    3. Hypercalcemia w/ suppressed PTH - in the setting of on going granulomatous disease (rest of hyperCa+ work up unremarkable)  Maintain adequate volume status  Maintain low calcium intake (no more than 400 mg/day) and low oxalate diet   Avoid dietary vitamin D supplements  Expect gradual correction of Ca+ levels as treatment of the underlying disease continues    4. 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 Hospital course c/b NARA non oliguric initially thought to be prerenal/ATN with partial response to IVF down to 2.1 ranges. However, gradual uptrend noted starting on 1/8  (2.23-2.0-2.1) w/ highest SCr noted on 1/11 at 2.43 in addition to peripheral Eosinophilia 13.3% on 1/10 suggesting additional renal insult such as AIN drug induced     1. Non oliguric NARA sec to prerenal/ATN vs possible AIN - initial renal insult attributed to ATN/pre renal etiology which responded to IVF trial w/ SCr down to 2.1 ranges  Gradual uptrend noted on renal function up to 2.43 ranges in the setting of normal renal US, stable hemodynamics, bland UA and peripheral Eosinophilia suggesting acute interstitial nephritis (drug induced) possibly related to rifampin use (started on 1/6)  Empirically started on prednisone 60mg because of these findings on 1/11. Has received 5 days so far with most recent SCr at 1.8 ranges  Discussed w/ ID and since Rifampin is a crucial anti-tb medication, stopping this medication will lead to poor clinical outcomes, it was decided to c/w this agent in addition to PO steroids.   Will c/w prednisone 50mg PO daily in anticipation of gradual and slow taper   prednisone taper will be needed upon discharge starting at prednisone 50qd and tapering down 10 mg every 5 days   strict I/O for monitoring   c/w PUD ppx w/ H2 antagonist   Adjust and dose all medications to current GFR  Work up for other potential disease mimics unremarkable   Trend BMP for additional renal function monitoring   Treatment for AIN-DI is usually between 4-6 weeks  Outpatient Nephrology follow up     2. Hx of HTN -  c/w hydralazine 25mg TID and Labetalol 100mg PO BID  This antihypertensive regiment should be continued while patient is receiving TB treatment/medications due to pharmacological interactions with other agents decreasing efficacy.     3. Hypercalcemia w/ suppressed PTH - in the setting of on going granulomatous disease (rest of hyperCa+ work up unremarkable)  Maintain adequate volume status  Maintain low calcium intake (no more than 400 mg/day) and low oxalate diet   Avoid dietary vitamin D supplements  Expect gradual correction of Ca+ levels as treatment of the underlying disease continues    4. CKD Stage III - at baseline  c/w Bicarb 1300mg PO TID improving   C/w to avoid NSAIDs and other nephrotoxins     Discussed w/ primary team

## 2024-01-17 NOTE — PROGRESS NOTE ADULT - SUBJECTIVE AND OBJECTIVE BOX
INFECTIOUS DISEASES CONSULT FOLLOW-UP NOTE    INTERVAL HPI/OVERNIGHT EVENTS:      ROS:   Constitutional, eyes, ENT, cardiovascular, respiratory, gastrointestinal, genitourinary, integumentary, neurological, psychiatric and heme/lymph are otherwise negative other than noted above       ANTIBIOTICS/RELEVANT:    MEDICATIONS  (STANDING):  atorvastatin 10 milliGRAM(s) Oral at bedtime  bisacodyl 5 milliGRAM(s) Oral at bedtime  famotidine    Tablet 10 milliGRAM(s) Oral daily  heparin   Injectable 5000 Unit(s) SubCutaneous every 8 hours  hydrALAZINE 25 milliGRAM(s) Oral every 8 hours  isoniazid 300 milliGRAM(s) Oral daily  labetalol 200 milliGRAM(s) Oral every 8 hours  lactated ringers. 1000 milliLiter(s) (50 mL/Hr) IV Continuous <Continuous>  levoFLOXacin  Tablet 750 milliGRAM(s) Oral every 48 hours  polyethylene glycol 3350 17 Gram(s) Oral daily  predniSONE   Tablet 50 milliGRAM(s) Oral daily  pyrazinamide 1500 milliGRAM(s) Oral <User Schedule>  pyridoxine 50 milliGRAM(s) Oral daily  rifAMPin 600 milliGRAM(s) Oral daily  senna 2 Tablet(s) Oral at bedtime  sodium bicarbonate 325 milliGRAM(s) Oral two times a day    MEDICATIONS  (PRN):  acetaminophen     Tablet .. 975 milliGRAM(s) Oral every 6 hours PRN Mild Pain (1 - 3)  oxyCODONE    IR 5 milliGRAM(s) Oral every 4 hours PRN Moderate Pain (4 - 6)        Vital Signs Last 24 Hrs  T(C): 36.1 (17 Jan 2024 08:49), Max: 36.7 (17 Jan 2024 01:04)  T(F): 96.9 (17 Jan 2024 08:49), Max: 98.1 (17 Jan 2024 01:04)  HR: 98 (17 Jan 2024 08:30) (80 - 100)  BP: 115/56 (17 Jan 2024 08:30) (115/56 - 143/74)  BP(mean): 81 (17 Jan 2024 08:30) (81 - 100)  RR: 16 (17 Jan 2024 08:30) (15 - 18)  SpO2: 97% (17 Jan 2024 08:30) (95% - 99%)    Parameters below as of 17 Jan 2024 08:30  Patient On (Oxygen Delivery Method): room air        01-16-24 @ 07:01  -  01-17-24 @ 07:00  --------------------------------------------------------  IN: 1290 mL / OUT: 200 mL / NET: 1090 mL      PHYSICAL EXAM:  Constitutional: alert, NAD  Eyes: the sclera and conjunctiva were normal.   ENT: the ears and nose were normal in appearance.   Neck: the appearance of the neck was normal and the neck was supple.   Pulmonary: no respiratory distress and lungs were clear to auscultation bilaterally.   Heart: heart rate was normal and rhythm regular, normal S1 and S2  Vascular:. there was no peripheral edema  Abdomen: normal bowel sounds, soft, non-tender  Neurological: no focal deficits.   Psychiatric: the affect was normal        LABS:                        9.2    10.78 )-----------( 308      ( 17 Jan 2024 05:30 )             28.3     01-17    141  |  109<H>  |  30<H>  ----------------------------<  117<H>  4.4   |  21<L>  |  1.54<H>    Ca    9.3      17 Jan 2024 05:30  Mg     1.6     01-17    TPro  6.1  /  Alb  2.7<L>  /  TBili  0.2  /  DBili  x   /  AST  30  /  ALT  24  /  AlkPhos  69  01-17      Urinalysis Basic - ( 17 Jan 2024 05:30 )    Color: x / Appearance: x / SG: x / pH: x  Gluc: 117 mg/dL / Ketone: x  / Bili: x / Urobili: x   Blood: x / Protein: x / Nitrite: x   Leuk Esterase: x / RBC: x / WBC x   Sq Epi: x / Non Sq Epi: x / Bacteria: x        MICROBIOLOGY:      RADIOLOGY & ADDITIONAL STUDIES:  Reviewed INFECTIOUS DISEASES CONSULT FOLLOW-UP NOTE    INTERVAL HPI/OVERNIGHT EVENTS:  Patient states he feels better. No complaints this AM.    ROS:   Constitutional, eyes, ENT, cardiovascular, respiratory, gastrointestinal, genitourinary, integumentary, neurological, psychiatric and heme/lymph are otherwise negative other than noted above       ANTIBIOTICS/RELEVANT:    MEDICATIONS  (STANDING):  atorvastatin 10 milliGRAM(s) Oral at bedtime  bisacodyl 5 milliGRAM(s) Oral at bedtime  famotidine    Tablet 10 milliGRAM(s) Oral daily  heparin   Injectable 5000 Unit(s) SubCutaneous every 8 hours  hydrALAZINE 25 milliGRAM(s) Oral every 8 hours  isoniazid 300 milliGRAM(s) Oral daily  labetalol 200 milliGRAM(s) Oral every 8 hours  lactated ringers. 1000 milliLiter(s) (50 mL/Hr) IV Continuous <Continuous>  levoFLOXacin  Tablet 750 milliGRAM(s) Oral every 48 hours  polyethylene glycol 3350 17 Gram(s) Oral daily  predniSONE   Tablet 50 milliGRAM(s) Oral daily  pyrazinamide 1500 milliGRAM(s) Oral <User Schedule>  pyridoxine 50 milliGRAM(s) Oral daily  rifAMPin 600 milliGRAM(s) Oral daily  senna 2 Tablet(s) Oral at bedtime  sodium bicarbonate 325 milliGRAM(s) Oral two times a day    MEDICATIONS  (PRN):  acetaminophen     Tablet .. 975 milliGRAM(s) Oral every 6 hours PRN Mild Pain (1 - 3)  oxyCODONE    IR 5 milliGRAM(s) Oral every 4 hours PRN Moderate Pain (4 - 6)        Vital Signs Last 24 Hrs  T(C): 36.1 (17 Jan 2024 08:49), Max: 36.7 (17 Jan 2024 01:04)  T(F): 96.9 (17 Jan 2024 08:49), Max: 98.1 (17 Jan 2024 01:04)  HR: 98 (17 Jan 2024 08:30) (80 - 100)  BP: 115/56 (17 Jan 2024 08:30) (115/56 - 143/74)  BP(mean): 81 (17 Jan 2024 08:30) (81 - 100)  RR: 16 (17 Jan 2024 08:30) (15 - 18)  SpO2: 97% (17 Jan 2024 08:30) (95% - 99%)    Parameters below as of 17 Jan 2024 08:30  Patient On (Oxygen Delivery Method): room air        01-16-24 @ 07:01  -  01-17-24 @ 07:00  --------------------------------------------------------  IN: 1290 mL / OUT: 200 mL / NET: 1090 mL      PHYSICAL EXAM:  Constitutional: non-toxic, no distress  Eyes:LIZY, EOMI  Ear/Nose/Throat: no oral lesion, no sinus tenderness on percussion	  Neck:  supple  Respiratory: CTA jose  Cardiovascular: S1S2 RRR, no murmurs  Gastrointestinal:soft, (+) BS, no HSM  Extremities:no e/e/c  Vascular: DP Pulse:	right normal; left normal          LABS:                        9.2    10.78 )-----------( 308      ( 17 Jan 2024 05:30 )             28.3     01-17    141  |  109<H>  |  30<H>  ----------------------------<  117<H>  4.4   |  21<L>  |  1.54<H>    Ca    9.3      17 Jan 2024 05:30  Mg     1.6     01-17    TPro  6.1  /  Alb  2.7<L>  /  TBili  0.2  /  DBili  x   /  AST  30  /  ALT  24  /  AlkPhos  69  01-17      Urinalysis Basic - ( 17 Jan 2024 05:30 )    Color: x / Appearance: x / SG: x / pH: x  Gluc: 117 mg/dL / Ketone: x  / Bili: x / Urobili: x   Blood: x / Protein: x / Nitrite: x   Leuk Esterase: x / RBC: x / WBC x   Sq Epi: x / Non Sq Epi: x / Bacteria: x        MICROBIOLOGY:      RADIOLOGY & ADDITIONAL STUDIES:  Reviewed

## 2024-01-17 NOTE — PROGRESS NOTE ADULT - TIME BILLING
treatment of active TB
treatment of presumed TB
treatment of pulmonary TB
evaluation for active TB
pulmonary TB
treatment of pulmonary TB

## 2024-01-17 NOTE — DISCHARGE NOTE NURSING/CASE MANAGEMENT/SOCIAL WORK - PATIENT PORTAL LINK FT
You can access the FollowMyHealth Patient Portal offered by [facility] by registering at the following website: [email or web address]. By joining [employer]’s FollowMyHealth portal, you will also be able to view your health information using other applications (apps) compatible with our system.

## 2024-01-17 NOTE — DISCHARGE NOTE NURSING/CASE MANAGEMENT/SOCIAL WORK - NSDCFUADDAPPT_GEN_ALL_CORE_FT
Regarding your follow up appointments, our office will call you with the scheduled dates and times, if you do not receive a call by 1/18 please call [phone].     The office of Hay Mccarthy requires you to call and schedule an appointment, please call as soon as possible for an appointment in 1 week     You must attend your [facility] appointment on 2/6 at 12:30.

## 2024-01-17 NOTE — PROGRESS NOTE ADULT - SUBJECTIVE AND OBJECTIVE BOX
GUILLAUME MANNING , [id number],  [facility] [id number]    Time of encounter :  8 am ,   seen with CTS Dr. Lopez/LORNE Cheung.  wife and son at the bedside  sitting on bedside chair, eating.  feeling well and happy.   - interval removal of blow hole- saturating well on room air.  -given one unit of prbc last night, cr 1.5 today.     T(C): 36.1 (01-17-24 @ 08:49), Max: 36.7 (01-17-24 @ 01:04)  HR: 98 (01-17-24 @ 08:30) (80 - 106)  BP: 115/56 (01-17-24 @ 08:30) (115/56 - 143/74)  RR: 16 (01-17-24 @ 08:30) (15 - 18)  SpO2: 97% (01-17-24 @ 08:30) (95% - 99%)    acetaminophen     Tablet .. 975 milliGRAM(s) Oral every 6 hours PRN  atorvastatin 10 milliGRAM(s) Oral at bedtime  bisacodyl 5 milliGRAM(s) Oral at bedtime  famotidine    Tablet 10 milliGRAM(s) Oral daily  heparin   Injectable 5000 Unit(s) SubCutaneous every 8 hours  hydrALAZINE 25 milliGRAM(s) Oral every 8 hours  isoniazid 300 milliGRAM(s) Oral daily  labetalol 200 milliGRAM(s) Oral every 8 hours  lactated ringers. 1000 milliLiter(s) IV Continuous <Continuous>  levoFLOXacin  Tablet 750 milliGRAM(s) Oral every 48 hours  oxyCODONE    IR 5 milliGRAM(s) Oral every 4 hours PRN  polyethylene glycol 3350 17 Gram(s) Oral daily  predniSONE   Tablet 50 milliGRAM(s) Oral daily  pyrazinamide 1500 milliGRAM(s) Oral <User Schedule>  pyridoxine 50 milliGRAM(s) Oral daily  rifAMPin 600 milliGRAM(s) Oral daily  senna 2 Tablet(s) Oral at bedtime  sodium bicarbonate 325 milliGRAM(s) Oral two times a day      Physical Exam :    General exam :  sitting on bedside chair, saturating well on room air.  RRR   good air entry on lung exam  no edema noted on bilateral lower ext  neuro - non focal.     CBC Full  -  ( 17 Jan 2024 05:30 )  WBC Count : 10.78 K/uL  RBC Count : 3.08 M/uL  Hemoglobin : 9.2 g/dL  Hematocrit : 28.3 %  Platelet Count - Automated : 308 K/uL  Mean Cell Volume : 91.9 fl  Mean Cell Hemoglobin : 29.9 pg  Mean Cell Hemoglobin Concentration : 32.5 gm/dL  Auto Neutrophil # : x  Auto Lymphocyte # : x  Auto Monocyte # : x  Auto Eosinophil # : x  Auto Basophil # : x  Auto Neutrophil % : x  Auto Lymphocyte % : x  Auto Monocyte % : x  Auto Eosinophil % : x  Auto Basophil % : x        01-17    141  |  109<H>  |  30<H>  ----------------------------<  117<H>  4.4   |  21<L>  |  1.54<H>    Ca    9.3      17 Jan 2024 05:30  Mg     1.6     01-17    TPro  6.1  /  Alb  2.7<L>  /  TBili  0.2  /  DBili  x   /  AST  30  /  ALT  24  /  AlkPhos  69  01-17    Daily     Daily   CAPILLARY BLOOD GLUCOSE          Urinalysis Basic - ( 17 Jan 2024 05:30 )    Color: x / Appearance: x / SG: x / pH: x  Gluc: 117 mg/dL / Ketone: x  / Bili: x / Urobili: x   Blood: x / Protein: x / Nitrite: x   Leuk Esterase: x / RBC: x / WBC x   Sq Epi: x / Non Sq Epi: x / Bacteria: x

## 2024-01-17 NOTE — PROGRESS NOTE ADULT - ASSESSMENT
73 year old M, Cantonese speaking, never a smoker, with PMHx of HTN, HLD, CKD3,BPH,  Gout, hx Shingles right arm ( 10/2023), hx RLQ pain since ( 8/2023) along with poor appetite, unintentional weight loss around 20 lbs, and progressively worse hoarseness and dyspnea. recent EGD and colonoscopy unremarkable. Pt was referred by Heme Onc Dr. Edward Jensen for an initial evaluation of right pleural effusion. Pt underwent 	R VATS RA Pleural biopsy and decortication (Frozen + Necrotizing granuloma) 1/5/24- pleural AFB positive, sputum AFB smear negative x 3 ( culture pending ) on RIBE + B 6 - ethambutol dc due to floaters, started on levaquin, - post CT removal, right -pneumothorax-  pig tail place 1/11-, significant subq emphysemia - blow hole with wound vac,  pod # 11    - 9LA for HD monitoring   - pig tail , blow hole with wound vac- care by CTS appreciated.  saturating well on room air-decrease amount of subq emphysema.  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  "1.  Continue Rifampin 600 mg PO daily  2.  Continue  mg PO daily  3.  Stop Ethambutol 1200 mg PO three times a week (M, W, F). Patient is having vision changes and seeing floaters now, which is known side effect of Ethambutol.  4.  Start Levaquin 750mg PO q 48 hours-   5.  Continue pyrazinamide 1500 mg PO three times a week (M, W, F)  6.  Continue vitamin B6 50 mg PO daily"    - ID contacted  [facility]-  AARON called and discussed with wife 1/10/24- ( not contagious to others) to follow up with AARON    - Renal f/u appreciated, for NARA on CKD3 (baseline Cr 1.8) : S Cr baseline 1.91( 12/15/23)-> 2.71 ( 1/5)-> 2.52( 1/6)-> 2.13( 1/7)-> 2.23( 1/8)-> 2.05(1/9) , UA noted w. mild proteinuria--> 1.9 on 1/14--> 1.8 1/15-  IVF and tolerating oral   - ANCA negative  - renal concern AIN started on prednisone 60 mg # 6- given IVF as well, renal function continue to improve.  - fu renal for steroids dosing  - on sodium bicarbs 325 mg po bid   - adequate hydration   - for follow up to refer to Dr. Elif Garcia ( [phone] ) Dr. Garcia has office hours in both [address] and [place].     - Hypercalcemia: could be related to MTB. - avoid calcium supplement and vitamin D , adequate hydration.  -  trend BMP for S Cr and avoid nephrotoxic meds/NSAIDS/iv contrast dye     - pain control   - acetaminophen     Tablet .. 975 milliGRAM(s) Oral every 6 hours PRN  - oxyCODONE    IR 5 milliGRAM(s) Oral every 6 hours PRN  - bowel regimen:   - bisacodyl 5 milliGRAM(s) Oral at bedtime  - polyethylene glycol 3350 17 Gram(s) Oral daily- to restart  - senna 2 Tablet(s) Oral at bedtime-to restart.    - HTN: and tachycardia.- much improved.   - currently on Hydralazine 25 mg q 8 hourly  - labetalol 200 mg tid.      - HLD: atorvastatin 10 milliGRAM(s) Oral at bedtime  - GI ppx: pantoprazole    Tablet 40 milliGRAM(s) Oral before breakfast  - DVT ppx: heparin   Injectable 5000 Unit(s) SubCutaneous every 8 hours    Contacts:   Referring Heme Onc Dr Joanne Jensen     POC as d/w family and CTS PA, wife, ID.              73 year old M, Cantonese speaking, never a smoker, with PMHx of HTN, HLD, CKD3,BPH,  Gout, hx Shingles right arm ( 10/2023), hx RLQ pain since ( 8/2023) along with poor appetite, unintentional weight loss around 20 lbs, and progressively worse hoarseness and dyspnea. recent EGD and colonoscopy unremarkable. Pt was referred by Heme Onc Dr. Edward Jensen for an initial evaluation of right pleural effusion. Pt underwent 	R VATS RA Pleural biopsy and decortication (Frozen + Necrotizing granuloma) 1/5/24- pleural AFB positive, sputum AFB smear negative x 3 ( culture pending ) on RIBE + B 6 - ethambutol dc due to floaters, started on levaquin, - post CT removal, right -pneumothorax-  pig tail place 1/11-, significant subq emphysemia - blow hole with wound vac,  pod # 12    - 9LA for HD monitoring   - interval removal of pigtail, blow hole, recovering well and saturating well on room air  chest x ray reviewed.  decrease amount of subq emphysema.    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  1.  Continue Rifampin 600 mg PO daily  2.  Continue  mg PO daily  3.  Continu Levaquin 750mg PO q 48 hours  4.  Continue pyrazinamide 1500 mg PO three times a week (M, W, F)  5.  Continue vitamin B6 50 mg PO daily    .  Stop Ethambutol 1200 mg PO three times a week (M, W, F). Patient is having vision changes and seeing floaters now, which is known side effect of Ethambutol.    - ID contacted  [facility]-  AARON called and discussed with wife 1/10/24- ( not contagious to others) to follow up with AARON    - Renal f/u appreciated, for NARA on CKD3 (baseline Cr 1.8) : S Cr baseline 1.91( 12/15/23)-> 2.71 ( 1/5)-> 2.52( 1/6)-> 2.13( 1/7)-> 2.23( 1/8)-> 2.05(1/9) , UA noted w. mild proteinuria--> 1.9 on 1/14--> 1.8 1/15-  IVF and tolerating oral   - ANCA negative  - renal concern AIN started on prednisone 60 mg 1/11 to 1/16- taper to prednisone 50 mg 1/17- - given IVF as well, renal function continue to improve- cr 1.5 on 1/17 ( renal rec to continue steroids taper , fu renal Dr. Rodriguez .  - on sodium bicarbs 325 mg po bid   - adequate hydration   -pt known to renal Dr. Jennifer Guido ( same office as Dr. Elif Garcia) ( [phone] ) -they follow at [facility].     - Hypercalcemia: could be related to MTB. - avoid calcium supplement and vitamin D , adequate hydration.  -  trend BMP for S Cr and avoid nephrotoxic meds/NSAIDS/iv contrast dye     - pain control   - acetaminophen     Tablet .. 975 milliGRAM(s) Oral every 6 hours PRN  - oxyCODONE    IR 5 milliGRAM(s) Oral every 6 hours PRN  - bowel regimen:   - bisacodyl 5 milliGRAM(s) Oral at bedtime  - polyethylene glycol 3350 17 Gram(s) Oral daily- to restart  - senna 2 Tablet(s) Oral at bedtime-to restart.    - HTN: and tachycardia.- much improved.   - currently on Hydralazine 25 mg q 8 hourly  - labetalol 200 mg tid.      - HLD: atorvastatin 10 milliGRAM(s) Oral at bedtime  - GI ppx: pantoprazole    Tablet 40 milliGRAM(s) Oral before breakfast  - DVT ppx: heparin   Injectable 5000 Unit(s) SubCutaneous every 8 hours    Contacts:   Referring Heme Onc Dr Joanne Jensen     POC as d/w family and CTS , wife and son.    pt to follow with [facility] for  TB treatment.  fu renal -will provide hand-off to Dr. Rodriguez.              73 year old M, Cantonese speaking, never a smoker, with PMHx of HTN, HLD, CKD3,BPH,  Gout, hx Shingles right arm ( 10/2023), hx RLQ pain since ( 8/2023) along with poor appetite, unintentional weight loss around 20 lbs, and progressively worse hoarseness and dyspnea. recent EGD and colonoscopy unremarkable. Pt was referred by Heme Onc Dr. Edward Jensen for an initial evaluation of right pleural effusion. Pt underwent 	R VATS RA Pleural biopsy and decortication (Frozen + Necrotizing granuloma) 1/5/24- pleural AFB positive, sputum AFB smear negative x 3 ( culture pending ) on RIBE + B 6 - ethambutol dc due to floaters, started on levaquin, - post CT removal, right -pneumothorax-  pig tail place 1/11-, significant subq emphysemia - blow hole with wound vac,  pod # 12    - 9LA for HD monitoring   - interval removal of pigtail, blow hole, recovering well and saturating well on room air  chest x ray reviewed.  decrease amount of subq emphysema.    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  1.  Continue Rifampin 600 mg PO daily  2.  Continue  mg PO daily  3.  Continu Levaquin 750mg PO q 48 hours  4.  Continue pyrazinamide 1500 mg PO three times a week (M, W, F)  5.  Continue vitamin B6 50 mg PO daily    .  Stop Ethambutol 1200 mg PO three times a week (M, W, F). Patient is having vision changes and seeing floaters now, which is known side effect of Ethambutol.    - ID contacted  [facility]-  AARON called and discussed with wife 1/10/24- ( not contagious to others) to follow up with AARON    - Renal f/u appreciated, for NARA on CKD3 (baseline Cr 1.8) : S Cr baseline 1.91( 12/15/23)-> 2.71 ( 1/5)-> 2.52( 1/6)-> 2.13( 1/7)-> 2.23( 1/8)-> 2.05(1/9) , UA noted w. mild proteinuria--> 1.9 on 1/14--> 1.8 1/15-  IVF and tolerating oral   - ANCA negative  - renal concern AIN started on prednisone 60 mg 1/11 to 1/16- taper to prednisone 50 mg 1/17-( rec to reduce 10 mg every 5 days) - given IVF as well, renal function continue to improve- cr 1.5 on 1/17 ( renal rec to continue steroids taper , fu renal Dr. Rodriguez .  - on sodium bicarbs 325 mg po bid   - adequate hydration   -pt known to renal Dr. Jennifer Guido ( same office as Dr. Elif Garcia) ( [phone] ) -they follow at [facility].     - Hypercalcemia: could be related to MTB. - avoid calcium supplement and vitamin D , adequate hydration.  -  trend BMP for S Cr and avoid nephrotoxic meds/NSAIDS/iv contrast dye     - pain control   - acetaminophen     Tablet .. 975 milliGRAM(s) Oral every 6 hours PRN  - oxyCODONE    IR 5 milliGRAM(s) Oral every 6 hours PRN  - bowel regimen:   - bisacodyl 5 milliGRAM(s) Oral at bedtime  - polyethylene glycol 3350 17 Gram(s) Oral daily- to restart  - senna 2 Tablet(s) Oral at bedtime-to restart.    - HTN: and tachycardia.- much improved.   - currently on Hydralazine 25 mg q 8 hourly  - labetalol 200 mg tid.      - HLD: atorvastatin 10 milliGRAM(s) Oral at bedtime  - GI ppx: pantoprazole    Tablet 40 milliGRAM(s) Oral before breakfast  - DVT ppx: heparin   Injectable 5000 Unit(s) SubCutaneous every 8 hours    Contacts:   Referring Heme Onc Dr Joanne Jensen     POC as d/w family and CTS , wife and son.    pt to follow with [facility] for  TB treatment.  fu renal -will provide hand-off to Dr. Rodriguez.

## 2024-01-17 NOTE — PROGRESS NOTE ADULT - ASSESSMENT
73 year old M, Cantonese speaking, never a smoker, with PMHx of HTN, HLD, who is referred by Dr. Edward Jensen for an initial evaluation of pleural effusion. Patient reports right lower quadrant pain since Aug 2023, along with poor appetite, unintentionally weight loss around 20lbs, fatigue, progressively worse hoarseness and dyspnea. He had Shingles right arm in Oct 2023, and was subsequently diagnosed with vocal cord paralysis. No personal history of TB infection. Pt underwent R VATS, pleural biopsy decortication, on 1/5 with Dr. Kaiser. Intraop path revealed necrotizing granuloma. ID was consulted for w/u of necrotizing granuloma, and concern for active TB.  Patient is having vision changes and seeing floaters now, which is known side effect of Ethambutol.    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  Steroids are not completely contraindicated with TB and in fact in some cases of TB (TB meningitis and TB pericarditis), steroids are indicated.  I explained that rifampin is the most important and efficacious medication for TB stopping it will lead to worse outcomes.  For now ok to continue rifampin and steroids while work up is underway    Recommend:  1.  Continue Rifampin 600 mg PO daily  2.  Continue  mg PO daily  3.  Continu Levaquin 750mg PO q 48 hours  4.  Continue pyrazinamide 1500 mg PO three times a week (M, W, F)  5.  Continue vitamin B6 50 mg PO daily  6.  Follow up AFB sputum cultures   7.  Discharge patient with Rifampin 600 mg PO daily,  mg PO daily, Levaquin 750mg PO q 48 hours,pyrazinamide 1500 mg PO three times a week (M, W, F), vitamin B6 50 mg PO daily  8.  Duration of antibiotics is 4 weeks, will de-escalate based on cultures  9.  Weekly labs: CMP, CBC faxed to ID office at [phone]  10.  Patient to follow up with Dr. Alcala in 2 weeks ([address] [place] [address], [phone]), ID office will call patient to schedule   11.  If primary team planning to discharge patient will need to submit TB discharge form (hospital epidemiology will reach out to you regarding this)  12.  Continue outpatient follow up with nephrologist Dr. Hay Rodriguez ( [phone]) for management of steroids for ?AIN.     ID team 1 will follow     73 year old M, Cantonese speaking, never a smoker, with PMHx of HTN, HLD, who is referred by Dr. Edward Jensen for an initial evaluation of pleural effusion. Patient reports right lower quadrant pain since Aug 2023, along with poor appetite, unintentionally weight loss around 20lbs, fatigue, progressively worse hoarseness and dyspnea. He had Shingles right arm in Oct 2023, and was subsequently diagnosed with vocal cord paralysis. No personal history of TB infection. Pt underwent R VATS, pleural biopsy decortication, on 1/5 with Dr. Kaiser. Intraop path revealed necrotizing granuloma. ID was consulted for w/u of necrotizing granuloma, and concern for active TB.  Patient is having vision changes and seeing floaters now, which is known side effect of Ethambutol.    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  Steroids are not completely contraindicated with TB and in fact in some cases of TB (TB meningitis and TB pericarditis), steroids are indicated.  I explained that rifampin is the most important and efficacious medication for TB stopping it will lead to worse outcomes.  For now ok to continue rifampin and steroids while work up is underway    Recommend:  1.  Continue Rifampin 600 mg PO daily  2.  Continue  mg PO daily  3.  Continu Levaquin 750mg PO q 48 hours  4.  Continue pyrazinamide 1500 mg PO three times a week (M, W, F)  5.  Continue vitamin B6 50 mg PO daily  6.  Follow up AFB sputum cultures   7.  Discharge patient with Rifampin 600 mg PO daily,  mg PO daily, Levaquin 750mg PO q 48 hours,pyrazinamide 1500 mg PO three times a week (M, W, F), vitamin B6 50 mg PO daily  8. Will need to continue induction therapy x 8 weeks total with deescalation based on cultures.    9.  Weekly labs: CMP, CBC faxed to ID office at [phone]  10.  Patient to follow up with Dr. Alcala in 2 weeks ([address] [place] [address], [phone]), ID office will call patient to schedule   11.  If primary team planning to discharge patient will need to submit TB discharge form (hospital epidemiology will reach out to you regarding this)  12.  Continue outpatient follow up with nephrologist Dr. Hay Rodriguez ( [phone]) for management of steroids for ?AIN.     ID team 1 will follow

## 2024-01-17 NOTE — PROGRESS NOTE ADULT - ATTENDING COMMENTS
Patient with pulmonary pleural TB.  Doing well on medications since we stopped ethambutol due to ocular side effects.  Continue RIPE as above.  I am not convinced the patient had AIN (not confirmed with biopsy) nor is there evidence it was due to rifampin.  Case reports exist but I also found case reports of Ethambutol causing renal impairment.  Continue INH, rifampin, PZA, and levaquin for now.  Check EKG to assess QTc prior to discharge.  He will need all 4 drugs for 8 weeks total (ends around 3/1/24). At that point we can switch to 2-drug therapy based on susceptibilities.  He can follow up with ID in 2-3 weeks for lab check.  He should follow with renal for steroid management
Seen and evaluated at bedside, ready to go home.   Renal function continues to show renal recovery at present w/ SCr at 1.5 ranges after peaking at 2.4   Initially presented w/ NARA possibly pre renal with partial response after IVF. 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  Recommend to c/w prednisone slow and gradual taper since pt will be receiving long course of rifampin as part of TB treatment   AIN duration of treatment approx 4-6 weeks  Recommend to decrease prednisone by 10mg every 5 days   Maintain strict glycemic control to avoid steroid induced hyperglycemia  c/w current BP regiment hydralazine 25mg PO TID And labetalol 100mg PO BID as these agents have no interactions w/ rifampin  C/w BP check ups at home  c/w bicarb 1300mg PO TID.   Avoid dietary supplements, NSAIDs and other nephrotoxins    Discussed w/ primary team
73y M w/ hx of HTN s/p R VATS procedure for pleural biopsy decortication with pathology showing necrotizing granuloma and hospital course c/b NARA on CKD w/ peaked sCr at 2.71 now 2.05  NARA possibly pre renal/ATN on previous hx of CKD  Hypercalcemia of 10 ranges w/ suppressed PTH  in the setting of on going granulomatous disease- TB  For BP management, c/w hydralazine 25mg PO TID and labetalol 100mg PO BID  Discussed with primary team
Agree with above. Patient reports vision changes and floaters that started 3 days ago.  I am concerned this could be due to Ethambutol. Can stop Ethambutol and start Levaquin 750 mg PO q48hrs (beginning 1/12/24) in it's place.  Continue INH, rifampin and PZA and Pyridoxine. TB meds are not typically nephrotoxic; would recommend searching for an alternative explanation for rising creatinine
73y M w/ hx of HTN s/p R VATS procedure for pleural biopsy decortication with pathology showing necrotizing granuloma and hospital course c/b NARA on CKD w/ peaked sCr at 2.71 now 2.23  NARA possibly pre renal/ATN on previous hx of CKD  ANCA serologies (PR3, MPO, C-ANCA and P-ANCA) requested in order to exclude other potential disease mimics ~ now pending  Hypercalcemia of 11.1 ranges w/ suppressed PTH possibly in the setting of on going granulomatous disease  WIll complete work up to rule out other possible causes.   Request work up as above  For BP management, start hydralazine 25mg PO TID- give first dose now   Discussed with primary team .
Agree with above.  Change Ethambutol and Pyrazinamide to three times weekly dosing (M, W, F ok). Continue daily rifampin and INH.  Continue Vit B6. Follow up AFB sputum culture. I have asked [facility] for MTB PCR on sputum and pleural fluid samples and asked pathologist for AFB stain on path report.

## 2024-01-17 NOTE — PROGRESS NOTE ADULT - SUBJECTIVE AND OBJECTIVE BOX
Patient is a 73y Male seen and evaluated at bedside.   No acute distress, VSS, sCr improving down to 1.58    Meds:    acetaminophen     Tablet .. 975 every 6 hours PRN  atorvastatin 10 at bedtime  bisacodyl 5 at bedtime  famotidine    Tablet 10 daily  heparin   Injectable 5000 every 8 hours  hydrALAZINE 25 every 8 hours  isoniazid 300 daily  labetalol 200 every 8 hours  lactated ringers. 1000 <Continuous>  levoFLOXacin  Tablet 750 every 48 hours  oxyCODONE    IR 5 every 4 hours PRN  polyethylene glycol 3350 17 daily  predniSONE   Tablet 50 daily  pyrazinamide 1500 <User Schedule>  pyridoxine 50 daily  rifAMPin 600 daily  senna 2 at bedtime  sodium bicarbonate 325 two times a day      T(C): , Max: 36.7 (01-17-24 @ 01:04)  T(F): , Max: 98.1 (01-17-24 @ 01:04)  HR: 98 (01-17-24 @ 08:30)  BP: 115/56 (01-17-24 @ 08:30)  BP(mean): 81 (01-17-24 @ 08:30)  RR: 16 (01-17-24 @ 08:30)  SpO2: 97% (01-17-24 @ 08:30)  Wt(kg): --    01-16 @ 07:01  -  01-17 @ 07:00  --------------------------------------------------------  IN: 1290 mL / OUT: 200 mL / NET: 1090 mL          Review of Systems:  ROS negative except as per HPI      PHYSICAL EXAM:  GENERAL: NAD   NECK: supple, No JVD  CHEST/LUNG: Clear to auscultation bilaterally  HEART: normal S1S2, RRR  ABDOMEN: Soft, Nontender   EXTREMITIES: No clubbing, cyanosis, or edema   NEUROLOGY: AAO x3, no focal neurological deficit    LABS:                        9.2    10.78 )-----------( 308      ( 17 Jan 2024 05:30 )             28.3     01-17    141  |  109<H>  |  30<H>  ----------------------------<  117<H>  4.4   |  21<L>  |  1.54<H>    Ca    9.3      17 Jan 2024 05:30  Mg     1.6     01-17    TPro  6.1  /  Alb  2.7<L>  /  TBili  0.2  /  DBili  x   /  AST  30  /  ALT  24  /  AlkPhos  69  01-17        Urinalysis Basic - ( 17 Jan 2024 05:30 )    Color: x / Appearance: x / SG: x / pH: x  Gluc: 117 mg/dL / Ketone: x  / Bili: x / Urobili: x   Blood: x / Protein: x / Nitrite: x   Leuk Esterase: x / RBC: x / WBC x   Sq Epi: x / Non Sq Epi: x / Bacteria: x            RADIOLOGY & ADDITIONAL STUDIES:

## 2024-01-18 LAB — NIGHT BLUE STAIN TISS: ABNORMAL

## 2024-01-19 ENCOUNTER — NON-APPOINTMENT (OUTPATIENT)
Age: 74
End: 2024-01-19

## 2024-01-19 ENCOUNTER — APPOINTMENT (OUTPATIENT)
Dept: THORACIC SURGERY | Facility: CLINIC | Age: 74
End: 2024-01-19
Payer: MEDICARE

## 2024-01-19 DIAGNOSIS — M31.30 WEGENER'S GRANULOMATOSIS W/OUT RENAL INVOLVEMENT: ICD-10-CM

## 2024-01-19 DIAGNOSIS — J90 PLEURAL EFFUSION, NOT ELSEWHERE CLASSIFIED: ICD-10-CM

## 2024-01-19 DIAGNOSIS — T37.1X5A ADVERSE EFFECT OF ANTIMYCOBACTERIAL DRUGS, INITIAL ENCOUNTER: ICD-10-CM

## 2024-01-19 DIAGNOSIS — E78.5 HYPERLIPIDEMIA, UNSPECIFIED: ICD-10-CM

## 2024-01-19 DIAGNOSIS — F05 DELIRIUM DUE TO KNOWN PHYSIOLOGICAL CONDITION: ICD-10-CM

## 2024-01-19 DIAGNOSIS — N17.0 ACUTE KIDNEY FAILURE WITH TUBULAR NECROSIS: ICD-10-CM

## 2024-01-19 DIAGNOSIS — A15.6 TUBERCULOUS PLEURISY: ICD-10-CM

## 2024-01-19 DIAGNOSIS — H53.8 OTHER VISUAL DISTURBANCES: ICD-10-CM

## 2024-01-19 DIAGNOSIS — J93.82 OTHER AIR LEAK: ICD-10-CM

## 2024-01-19 DIAGNOSIS — N18.30 CHRONIC KIDNEY DISEASE, STAGE 3 UNSPECIFIED: ICD-10-CM

## 2024-01-19 DIAGNOSIS — J95.811 POSTPROCEDURAL PNEUMOTHORAX: ICD-10-CM

## 2024-01-19 DIAGNOSIS — E43 UNSPECIFIED SEVERE PROTEIN-CALORIE MALNUTRITION: ICD-10-CM

## 2024-01-19 DIAGNOSIS — J91.8 PLEURAL EFFUSION IN OTHER CONDITIONS CLASSIFIED ELSEWHERE: ICD-10-CM

## 2024-01-19 DIAGNOSIS — J92.9 PLEURAL PLAQUE W/OUT ASBESTOS: ICD-10-CM

## 2024-01-19 DIAGNOSIS — E83.52 HYPERCALCEMIA: ICD-10-CM

## 2024-01-19 DIAGNOSIS — J98.2 INTERSTITIAL EMPHYSEMA: ICD-10-CM

## 2024-01-19 DIAGNOSIS — T36.6X5A ADVERSE EFFECT OF RIFAMPICINS, INITIAL ENCOUNTER: ICD-10-CM

## 2024-01-19 DIAGNOSIS — I12.9 HYPERTENSIVE CHRONIC KIDNEY DISEASE WITH STAGE 1 THROUGH STAGE 4 CHRONIC KIDNEY DISEASE, OR UNSPECIFIED CHRONIC KIDNEY DISEASE: ICD-10-CM

## 2024-01-19 DIAGNOSIS — N14.19 NEPHROPATHY INDUCED BY OTHER DRUGS, MEDICAMENTS AND BIOLOGICAL SUBSTANCES: ICD-10-CM

## 2024-01-19 DIAGNOSIS — Z79.82 LONG TERM (CURRENT) USE OF ASPIRIN: ICD-10-CM

## 2024-01-19 DIAGNOSIS — Z79.899 OTHER LONG TERM (CURRENT) DRUG THERAPY: ICD-10-CM

## 2024-01-19 DIAGNOSIS — N40.0 BENIGN PROSTATIC HYPERPLASIA WITHOUT LOWER URINARY TRACT SYMPTOMS: ICD-10-CM

## 2024-01-19 DIAGNOSIS — R00.0 TACHYCARDIA, UNSPECIFIED: ICD-10-CM

## 2024-01-19 DIAGNOSIS — M10.9 GOUT, UNSPECIFIED: ICD-10-CM

## 2024-01-19 PROBLEM — A15.0: Status: ACTIVE | Noted: 2024-01-19

## 2024-01-19 RX ORDER — FEBUXOSTAT 40 MG/1
40 TABLET ORAL
Qty: 30 | Refills: 0 | Status: DISCONTINUED | COMMUNITY
Start: 2023-07-21 | End: 2024-01-19

## 2024-01-19 RX ORDER — ISONIAZID 300 MG/1
300 TABLET ORAL
Refills: 0 | Status: ACTIVE | COMMUNITY

## 2024-01-19 RX ORDER — LABETALOL HYDROCHLORIDE 200 MG/1
200 TABLET, FILM COATED ORAL 3 TIMES DAILY
Refills: 0 | Status: ACTIVE | COMMUNITY

## 2024-01-19 RX ORDER — DONEPEZIL HYDROCHLORIDE 5 MG/1
5 TABLET ORAL
Qty: 90 | Refills: 0 | Status: DISCONTINUED | COMMUNITY
Start: 2023-12-08 | End: 2024-01-19

## 2024-01-19 RX ORDER — SODIUM BICARBONATE 650 MG/1
650 TABLET ORAL
Refills: 0 | Status: ACTIVE | COMMUNITY

## 2024-01-19 RX ORDER — SODIUM BICARBONATE 650 MG/1
650 TABLET ORAL
Refills: 0 | Status: DISCONTINUED | COMMUNITY
End: 2024-01-19

## 2024-01-19 RX ORDER — LEVOFLOXACIN 750 MG/1
750 TABLET, FILM COATED ORAL AS DIRECTED
Refills: 0 | Status: ACTIVE | COMMUNITY

## 2024-01-19 RX ORDER — PYRIDOXINE HCL (VITAMIN B6) 50 MG
50 TABLET ORAL
Refills: 0 | Status: ACTIVE | COMMUNITY

## 2024-01-19 RX ORDER — AMLODIPINE BESYLATE 5 MG/1
5 TABLET ORAL
Refills: 0 | Status: DISCONTINUED | COMMUNITY
End: 2024-01-19

## 2024-01-19 RX ORDER — GABAPENTIN 300 MG/1
300 CAPSULE ORAL
Qty: 60 | Refills: 0 | Status: DISCONTINUED | COMMUNITY
Start: 2023-11-20 | End: 2024-01-19

## 2024-01-19 RX ORDER — HYDRALAZINE HYDROCHLORIDE 25 MG/1
25 TABLET ORAL
Refills: 0 | Status: ACTIVE | COMMUNITY

## 2024-01-19 RX ORDER — ATORVASTATIN CALCIUM 10 MG/1
10 TABLET, FILM COATED ORAL
Refills: 0 | Status: ACTIVE | COMMUNITY

## 2024-01-19 RX ORDER — RIFAMPIN 300 MG/1
300 CAPSULE ORAL DAILY
Refills: 0 | Status: ACTIVE | COMMUNITY

## 2024-01-19 RX ORDER — FEBUXOSTAT 40 MG/1
40 TABLET ORAL
Refills: 0 | Status: ACTIVE | COMMUNITY

## 2024-01-19 RX ORDER — FLUTICASONE FUROATE, UMECLIDINIUM BROMIDE AND VILANTEROL TRIFENATATE 100; 62.5; 25 UG/1; UG/1; UG/1
100-62.5-25 POWDER RESPIRATORY (INHALATION)
Qty: 60 | Refills: 0 | Status: DISCONTINUED | COMMUNITY
Start: 2023-09-14 | End: 2024-01-19

## 2024-01-19 RX ORDER — DEXLANSOPRAZOLE 60 MG/1
60 CAPSULE, DELAYED RELEASE ORAL
Qty: 30 | Refills: 0 | Status: DISCONTINUED | COMMUNITY
Start: 2023-06-10 | End: 2024-01-19

## 2024-01-19 RX ORDER — PYRAZINAMIDE 500 MG/1
500 TABLET ORAL AS DIRECTED
Refills: 0 | Status: ACTIVE | COMMUNITY

## 2024-01-19 RX ORDER — LOSARTAN POTASSIUM 100 MG/1
100 TABLET, FILM COATED ORAL
Refills: 0 | Status: DISCONTINUED | COMMUNITY
End: 2024-01-19

## 2024-01-26 ENCOUNTER — APPOINTMENT (OUTPATIENT)
Dept: THORACIC SURGERY | Facility: CLINIC | Age: 74
End: 2024-01-26
Payer: MEDICARE

## 2024-01-26 ENCOUNTER — OUTPATIENT (OUTPATIENT)
Dept: OUTPATIENT SERVICES | Facility: HOSPITAL | Age: 74
LOS: 1 days | End: 2024-01-26
Payer: MEDICARE

## 2024-01-26 DIAGNOSIS — A15.6 TUBERCULOUS PLEURISY: ICD-10-CM

## 2024-01-26 DIAGNOSIS — G89.18 OTHER ACUTE POSTPROCEDURAL PAIN: ICD-10-CM

## 2024-01-26 DIAGNOSIS — A15.0 TUBERCULOSIS OF LUNG: ICD-10-CM

## 2024-01-26 PROCEDURE — 99024 POSTOP FOLLOW-UP VISIT: CPT

## 2024-01-26 PROCEDURE — 71046 X-RAY EXAM CHEST 2 VIEWS: CPT | Mod: 26

## 2024-01-26 PROCEDURE — 71046 X-RAY EXAM CHEST 2 VIEWS: CPT

## 2024-01-29 ENCOUNTER — APPOINTMENT (OUTPATIENT)
Dept: INFECTIOUS DISEASE | Facility: CLINIC | Age: 74
End: 2024-01-29

## 2024-01-29 VITALS
HEIGHT: 68 IN | TEMPERATURE: 97.6 F | OXYGEN SATURATION: 96 % | DIASTOLIC BLOOD PRESSURE: 65 MMHG | SYSTOLIC BLOOD PRESSURE: 132 MMHG | RESPIRATION RATE: 18 BRPM | WEIGHT: 150 LBS | BODY MASS INDEX: 22.73 KG/M2 | HEART RATE: 101 BPM

## 2024-01-29 PROBLEM — A15.6: Status: ACTIVE | Noted: 2024-01-29

## 2024-01-29 PROBLEM — G89.18 POSTOPERATIVE PAIN: Status: ACTIVE | Noted: 2024-01-29

## 2024-01-29 RX ORDER — LABETALOL HYDROCHLORIDE 100 MG/1
100 TABLET, FILM COATED ORAL
Qty: 60 | Refills: 0 | Status: ACTIVE | COMMUNITY
Start: 2024-01-23

## 2024-01-29 NOTE — REASON FOR VISIT
[de-identified] : 1.  Right video-assisted thoracoscopic surgery, robotic assisted.  2.  Drainage of loculated right pleural effusion.  3.  Pleural biopsies.  4.  Total pulmonary decortication. [de-identified] : 01/05/24 [de-identified] : 3 [de-identified] : Intro-op frozen section positive for necrotizing granuloma. Postop hospitalization prolonged due to TB work up and PTX with subcutaneous emphysema.  Discharged on TB abx regiment, home isolation till 01/21/2024, Prednisone taper x 5 days. He was to follow up with ID and AARON.   Surgical pathology: - PLEURAL, RIGHT, FLUID: NEGATIVE FOR MALIGNANT CELLS. Specimen composed of reactive mesothelial, few macrophages and chronic inflammatory cells.    - Right pleural biopsy: necrotizing granulomatous inflammation. Fibrosis of surrounding pleura.  - Pleural right lower lobe and right upper lobe, peel: Necrotizing granulomatous inflammation. Fibrosis of surrounding pleura with adherent fibrin clot. - Culture   - Right pleura #2: mycobacterium tuberculosis complex isolated.  - Pleural fluid: no AFB isolated at 2weeks - Sputum: no AFB isolated at 2weeks.    Patient presents today with a CXR for the 1st postoperative visit. Patient reports moderate incisional pain and to the right upper abdomen area. Denies fever, chills, persist cough, or phlegm production.     [Family Member] : family member [Spouse] : spouse

## 2024-01-29 NOTE — ASSESSMENT
[FreeTextEntry1] : 73 year old M, Cantonese speaking, never a smoker, with PMHx of HTN, HLD, who is referred by Dr. Edward Jensen for evaluation of pleural effusion.  On 01/05/2024, he underwent RVATS, RA, Drainage of loculated right pleural effusion, Pleural biopsies and Total pulmonary decortication.  He presents today accompanied by family for the first postop visit.   Surgical pathology was reviewed with the patient, negative for malignancy.  He was found to have pleural tuberculosis and has started treatment during inpatient course.  I explained to the patient that his sputum culture is negative for TB, no airborne isolation is indicated at this time.  I should follow up with Department of Health and ID for pleural TB treatment.    His surgical incisions are healing well.  The one at right subclavicular area is still open, clean, dry.  Should continue wet-to-dry dressing.  Dressing change technique was instructed to his spouse during the visit.  Explained to the patient that the pain to RUQ is most likely due to peripheral neuropathy which is common in VATS.  Patient should continue oxycodone and acetaminophen. CXR today was reviewed, expected postop changes.    As for other changes of his medications, patient should follow up with PCP and nephrologist for dosage adjustment.  I will see him in 3 weeks for stability.   Plan: - RTO in 3 weeks with a CXR.    I, Dr. John Kaiser MD, personally performed the evaluation and management (E/M) services for this established patient who presents today with (a) new problem(s)/exacerbation of (an) existing condition(s).  That E/M includes conducting the clinically appropriate interval history &/or exam, assessing all new/exacerbated conditions, and establishing a new plan of care.  Today, my NP, Clau Meyers, was here to observe &/or participate in the visit & follow plan of care established by me.

## 2024-01-29 NOTE — DISCUSSION/SUMMARY
[Doing Well] : is doing well [Fair Pain Control] : has fair pain control [Remove Sutures/Staples] : removed sutures/staples [Dressing Change] : dressing change [Clinical Recheck] : clinical recheck [de-identified] : right lateral chest wall two sutures removed.  Right subclavicular wound dressing changed: wet-to-dry

## 2024-01-29 NOTE — COUNSELING
[No Heavy Lifting] : no heavy lifting (>15-20 lb. for 1 month or 25 lb. for 3 months from date of surgery) [Blood Pressure Control] : blood pressure control [S/S of infection] : signs and symptoms of infection (and to whom it should be reported) [Progressive Ambulation/Activity] : progressive ambulation/activity

## 2024-01-29 NOTE — PHYSICAL EXAM
[] : no respiratory distress [Respiration, Rhythm And Depth] : normal respiratory rhythm and effort [Dry] : dry [Healing Well] : healing well [Site: ___] : Site: [unfilled] [Clean] : clean

## 2024-01-30 ENCOUNTER — NON-APPOINTMENT (OUTPATIENT)
Age: 74
End: 2024-01-30

## 2024-01-30 LAB — NIGHT BLUE STAIN TISS: ABNORMAL

## 2024-01-31 ENCOUNTER — NON-APPOINTMENT (OUTPATIENT)
Age: 74
End: 2024-01-31

## 2024-01-31 LAB — NIGHT BLUE STAIN TISS: ABNORMAL

## 2024-02-03 LAB
CULTURE RESULTS: SIGNIFICANT CHANGE UP
CULTURE RESULTS: SIGNIFICANT CHANGE UP
SPECIMEN SOURCE: SIGNIFICANT CHANGE UP
SPECIMEN SOURCE: SIGNIFICANT CHANGE UP

## 2024-02-12 PROBLEM — Z09 POSTOP CHECK: Status: ACTIVE | Noted: 2024-01-10

## 2024-02-14 ENCOUNTER — NON-APPOINTMENT (OUTPATIENT)
Age: 74
End: 2024-02-14

## 2024-02-16 ENCOUNTER — OUTPATIENT (OUTPATIENT)
Dept: OUTPATIENT SERVICES | Facility: HOSPITAL | Age: 74
LOS: 1 days | End: 2024-02-16
Payer: MEDICARE

## 2024-02-16 ENCOUNTER — APPOINTMENT (OUTPATIENT)
Dept: THORACIC SURGERY | Facility: CLINIC | Age: 74
End: 2024-02-16
Payer: MEDICARE

## 2024-02-16 VITALS
BODY MASS INDEX: 23.49 KG/M2 | SYSTOLIC BLOOD PRESSURE: 147 MMHG | HEART RATE: 89 BPM | HEIGHT: 68 IN | OXYGEN SATURATION: 98 % | WEIGHT: 155 LBS | TEMPERATURE: 97.3 F | DIASTOLIC BLOOD PRESSURE: 71 MMHG

## 2024-02-16 DIAGNOSIS — Z09 ENCOUNTER FOR FOLLOW-UP EXAMINATION AFTER COMPLETED TREATMENT FOR CONDITIONS OTHER THAN MALIGNANT NEOPLASM: ICD-10-CM

## 2024-02-16 PROCEDURE — 71046 X-RAY EXAM CHEST 2 VIEWS: CPT

## 2024-02-16 PROCEDURE — 99024 POSTOP FOLLOW-UP VISIT: CPT

## 2024-02-16 PROCEDURE — 71046 X-RAY EXAM CHEST 2 VIEWS: CPT | Mod: 26

## 2024-02-18 NOTE — PHYSICAL EXAM
[] : no respiratory distress [Auscultation Breath Sounds / Voice Sounds] : lungs were clear to auscultation bilaterally [Site: ___] : Site: [unfilled] [Clean] : clean [Dry] : dry [Healing Well] : healing well [Bleeding] : no active bleeding [Foul Odor] : no foul smell [Purulent Drainage] : no purulent drainage [Serosanguinous Drainage] : no serosanguinous drainage [Erythema] : not erythematous [Warm] : not warm [Tender] : not tender

## 2024-02-18 NOTE — REASON FOR VISIT
[de-identified] : Right video-assited thoracoscopic surgery, robotic assisted, drainage of loculated right pleural effusion, pleural biopsies.   2. Drainage of loculated right pleural effusion.  3. Pleural biopsies. [de-identified] : 01/05/2024 [de-identified] : 6 [de-identified] : Surgical pathology: - PLEURAL, RIGHT, FLUID: NEGATIVE FOR MALIGNANT CELLS. Specimen composed of reactive mesothelial, few macrophages and chronic inflammatory cells. - Right pleural biopsy: necrotizing granulomatous inflammation. Fibrosis of surrounding pleura. - Pleural right lower lobe and right upper lobe, peel: Necrotizing granulomatous inflammation. Fibrosis of surrounding pleura with adherent fibrin clot. - Culture - Right pleura #2: mycobacterium tuberculosis complex isolated. - Pleural fluid: no AFB isolated at 2weeks - Sputum: no AFB isolated at 2weeks.  Patient presents today for a post-op follow up visit with CXR.  [Family Member] : family member

## 2024-02-18 NOTE — ASSESSMENT
[FreeTextEntry1] : 73-year-old M, Cantonese speaking, never a smoker, with PMHx of HTN, HLD, who was referred by Dr. Edward Jensen for evaluation of pleural effusion.  On 01/05/2024, he underwent RVATS, RA, Drainage of loculated right pleural effusion, Pleural biopsies and Total pulmonary decortication. Final pathology revealed pleural tuberculosis, he has started treatment during inpatient course. Sputum culture was negative for TB, no airborne isolation is indicated.   He presents today accompanied by family for a follow up visit with a CXR. Patient is doing well today. Pain is well controlled. Incisions are healing well. X-ray as expected, without evidence of pneumothorax, pleural effusion, or atelectasis. No concerns from a surgical standpoint. Patient advised to continue following up with [facility] health department for TB treatment. Will follow up with us as needed.  Plan: 1.Follow up with [facility] health department  2. Follow up with us as needed.   Orlando VALDEZ RN, am scribing for and the presence of Dr. MARINA YOUNG the following sections: history of present illness, past medical/family/surgical/family/social history, review of systems, vital signs, physical exam, and disposition.  MARINA VALDEZ, personally performed the services described in the documentation, reviewed the documentation recorded by the scribe in my presence and it accurately and completely records my words and actions.

## 2024-02-18 NOTE — COUNSELING
[Importance of Regular Medical Follow-Up] : the importance of regular medical follow-up [Hygeine (Including Daily Shower)] : hygeine (including daily shower) [S/S of infection] : signs and symptoms of infection (and to whom it should be reported) No

## 2024-02-18 NOTE — CONSULT LETTER
[Dear  ___] : Dear  [unfilled], [Consult Letter:] : I had the pleasure of evaluating your patient, [unfilled]. [Please see my note below.] : Please see my note below. [Consult Closing:] : Thank you very much for allowing me to participate in the care of this patient.  If you have any questions, please do not hesitate to contact me. [Sincerely,] : Sincerely, [FreeTextEntry3] : John Kaiser MD Professor, Cardiovascular & Thoracic Surgery [facility] [employer] Director of [facility]  Director of Thoracic Surgery, [address] [place] [address] Phone: [phone] Fax: [phone]

## 2024-02-18 NOTE — DISCUSSION/SUMMARY
[Doing Well] : is doing well [Excellent Pain Control] : has excellent pain control [No Sign of Infection] : is showing no signs of infection [1] : 1 [Dressing Change] : dressing was changed

## 2024-02-23 LAB
CULTURE RESULTS: ABNORMAL
SPECIMEN SOURCE: SIGNIFICANT CHANGE UP

## 2024-02-24 LAB
CULTURE RESULTS: SIGNIFICANT CHANGE UP
SPECIMEN SOURCE: SIGNIFICANT CHANGE UP

## 2024-02-29 LAB
-  ETHAMBUTOL: SIGNIFICANT CHANGE UP
-  ETHIONAMIDE: SIGNIFICANT CHANGE UP
-  FLUOROQUINOLONES: SIGNIFICANT CHANGE UP
-  ISONIAZID: SIGNIFICANT CHANGE UP
-  KANAMYCIN/AMIKACIN: SIGNIFICANT CHANGE UP
-  PYRAZINAMIDE: SIGNIFICANT CHANGE UP
-  RIFAMPIN: SIGNIFICANT CHANGE UP
-  STREPTOMYCIN: SIGNIFICANT CHANGE UP
-  WHOLE GENOME SEQUENCING: SIGNIFICANT CHANGE UP
METHOD TYPE: SIGNIFICANT CHANGE UP

## 2024-03-01 LAB
CULTURE RESULTS: ABNORMAL
CULTURE RESULTS: ABNORMAL
SPECIMEN SOURCE: SIGNIFICANT CHANGE UP
SPECIMEN SOURCE: SIGNIFICANT CHANGE UP

## 2024-03-15 LAB
-  ETHAMBUTOL (5.0 MCG/ML): SIGNIFICANT CHANGE UP
-  ISONIAZID (0.1 UG/ML): SIGNIFICANT CHANGE UP
-  RIFAMPIN (1.0 UG/ML): SIGNIFICANT CHANGE UP
CULTURE RESULTS: ABNORMAL
METHOD TYPE: SIGNIFICANT CHANGE UP
ORGANISM # SPEC MICROSCOPIC CNT: ABNORMAL
ORGANISM # SPEC MICROSCOPIC CNT: ABNORMAL
ORGANISM # SPEC MICROSCOPIC CNT: SIGNIFICANT CHANGE UP
SPECIMEN SOURCE: SIGNIFICANT CHANGE UP

## (undated) DEVICE — XI DRAPE COLUMN

## (undated) DEVICE — VENODYNE/SCD SLEEVE CALF MEDIUM

## (undated) DEVICE — DVC ASCOPE 4 SNGL USE SLIM

## (undated) DEVICE — TROCAR ETHICON ENDOPATH XCEL BLADELESS 15MM X 100MM STABILITY

## (undated) DEVICE — DRSG GAUZE PACKTNER ROLL

## (undated) DEVICE — D HELP - CLEARVIEW CLEARIFY SYSTEM

## (undated) DEVICE — ELCTR GROUNDING PAD ADULT COVIDIEN

## (undated) DEVICE — Device

## (undated) DEVICE — TUBING STRYKER PNEUMOCLEAR HEAT HUMID

## (undated) DEVICE — STAPLER COVIDIEN ENDO GIA STANDARD HANDLE

## (undated) DEVICE — XI SEAL UNIV 5- 8 MM

## (undated) DEVICE — WARMING BLANKET LOWER ADULT

## (undated) DEVICE — TAPE SILK 3"

## (undated) DEVICE — XI VESSEL SEALER

## (undated) DEVICE — PACK ROBOTIC

## (undated) DEVICE — TROCAR ETHICON ENDOPATH XCEL BLADELESS 12MM X 100MM SMOOTH

## (undated) DEVICE — SUT PROLENE 0 30" CT-1

## (undated) DEVICE — XI OBTURATOR OPTICAL BLADELESS 8MM

## (undated) DEVICE — GLV 7.5 PROTEXIS (WHITE)

## (undated) DEVICE — CHEST DRAIN PLEUR-EVAC DRY/WET ADULT-PEDS SINGLE (QUICK)

## (undated) DEVICE — XI DRAPE ARM

## (undated) DEVICE — ELCTR BOVIE TIP SPATULA MEGADYNE E-Z CLEAN LAPAROSCOPIC 13.5" STANDARD

## (undated) DEVICE — ELCTR BOVIE TIP BLADE INSULATED 6.5" EDGE

## (undated) DEVICE — XI ARM FORCEP FENESTRATED BIPOLAR 8MM

## (undated) DEVICE — ENDOCATCH GENERAL 15MM (PURPLE)

## (undated) DEVICE — ELCTR BOVIE TIP BLADE VALLEYLAB 6.5"

## (undated) DEVICE — TROCAR ETHICON ENDOPATH XCEL BLADELESS 5MM X 100MM STABILITY

## (undated) DEVICE — ENDOCATCH GENERAL 10MM (PURPLE)